# Patient Record
Sex: FEMALE | Race: WHITE | NOT HISPANIC OR LATINO | Employment: UNEMPLOYED | URBAN - METROPOLITAN AREA
[De-identification: names, ages, dates, MRNs, and addresses within clinical notes are randomized per-mention and may not be internally consistent; named-entity substitution may affect disease eponyms.]

---

## 2023-04-26 LAB
ALANINE AMINOTRANSFERASE (SGPT) (U/L) IN SER/PLAS: 9 U/L (ref 7–45)
ALBUMIN (G/DL) IN SER/PLAS: 3.5 G/DL (ref 3.4–5)
ALKALINE PHOSPHATASE (U/L) IN SER/PLAS: 49 U/L (ref 33–136)
ANION GAP IN SER/PLAS: 10 MMOL/L (ref 10–20)
ASPARTATE AMINOTRANSFERASE (SGOT) (U/L) IN SER/PLAS: 13 U/L (ref 9–39)
BASOPHILS (10*3/UL) IN BLOOD BY AUTOMATED COUNT: 0.04 X10E9/L (ref 0–0.1)
BASOPHILS/100 LEUKOCYTES IN BLOOD BY AUTOMATED COUNT: 0.8 % (ref 0–2)
BILIRUBIN TOTAL (MG/DL) IN SER/PLAS: 1.2 MG/DL (ref 0–1.2)
CALCIUM (MG/DL) IN SER/PLAS: 8.9 MG/DL (ref 8.6–10.3)
CARBON DIOXIDE, TOTAL (MMOL/L) IN SER/PLAS: 28 MMOL/L (ref 21–32)
CHLORIDE (MMOL/L) IN SER/PLAS: 102 MMOL/L (ref 98–107)
CHOLESTEROL (MG/DL) IN SER/PLAS: 170 MG/DL (ref 0–199)
CHOLESTEROL IN HDL (MG/DL) IN SER/PLAS: 58.4 MG/DL
CHOLESTEROL/HDL RATIO: 2.9
CREATININE (MG/DL) IN SER/PLAS: 0.88 MG/DL (ref 0.5–1.05)
EOSINOPHILS (10*3/UL) IN BLOOD BY AUTOMATED COUNT: 0.26 X10E9/L (ref 0–0.4)
EOSINOPHILS/100 LEUKOCYTES IN BLOOD BY AUTOMATED COUNT: 5.2 % (ref 0–6)
ERYTHROCYTE DISTRIBUTION WIDTH (RATIO) BY AUTOMATED COUNT: 14.2 % (ref 11.5–14.5)
ERYTHROCYTE MEAN CORPUSCULAR HEMOGLOBIN CONCENTRATION (G/DL) BY AUTOMATED: 31.3 G/DL (ref 32–36)
ERYTHROCYTE MEAN CORPUSCULAR VOLUME (FL) BY AUTOMATED COUNT: 97 FL (ref 80–100)
ERYTHROCYTES (10*6/UL) IN BLOOD BY AUTOMATED COUNT: 4.56 X10E12/L (ref 4–5.2)
ESTIMATED AVERAGE GLUCOSE FOR HBA1C: 197 MG/DL
GFR FEMALE: 64 ML/MIN/1.73M2
GLUCOSE (MG/DL) IN SER/PLAS: 148 MG/DL (ref 74–99)
HEMATOCRIT (%) IN BLOOD BY AUTOMATED COUNT: 44.1 % (ref 36–46)
HEMOGLOBIN (G/DL) IN BLOOD: 13.8 G/DL (ref 12–16)
HEMOGLOBIN A1C/HEMOGLOBIN TOTAL IN BLOOD: 8.5 %
IMMATURE GRANULOCYTES/100 LEUKOCYTES IN BLOOD BY AUTOMATED COUNT: 0.4 % (ref 0–0.9)
LDL: 95 MG/DL (ref 0–99)
LEUKOCYTES (10*3/UL) IN BLOOD BY AUTOMATED COUNT: 5 X10E9/L (ref 4.4–11.3)
LYMPHOCYTES (10*3/UL) IN BLOOD BY AUTOMATED COUNT: 1.55 X10E9/L (ref 0.8–3)
LYMPHOCYTES/100 LEUKOCYTES IN BLOOD BY AUTOMATED COUNT: 31 % (ref 13–44)
MONOCYTES (10*3/UL) IN BLOOD BY AUTOMATED COUNT: 0.49 X10E9/L (ref 0.05–0.8)
MONOCYTES/100 LEUKOCYTES IN BLOOD BY AUTOMATED COUNT: 9.8 % (ref 2–10)
NEUTROPHILS (10*3/UL) IN BLOOD BY AUTOMATED COUNT: 2.64 X10E9/L (ref 1.6–5.5)
NEUTROPHILS/100 LEUKOCYTES IN BLOOD BY AUTOMATED COUNT: 52.8 % (ref 40–80)
PLATELETS (10*3/UL) IN BLOOD AUTOMATED COUNT: 156 X10E9/L (ref 150–450)
POTASSIUM (MMOL/L) IN SER/PLAS: 4.1 MMOL/L (ref 3.5–5.3)
PROTEIN TOTAL: 6.1 G/DL (ref 6.4–8.2)
SODIUM (MMOL/L) IN SER/PLAS: 136 MMOL/L (ref 136–145)
TRIGLYCERIDE (MG/DL) IN SER/PLAS: 84 MG/DL (ref 0–149)
UREA NITROGEN (MG/DL) IN SER/PLAS: 26 MG/DL (ref 6–23)
VLDL: 17 MG/DL (ref 0–40)

## 2023-04-28 RX ORDER — ACETAMINOPHEN 325 MG/1
650 TABLET ORAL EVERY 8 HOURS PRN
COMMUNITY

## 2023-04-28 RX ORDER — LISINOPRIL 20 MG/1
1 TABLET ORAL 2 TIMES DAILY
COMMUNITY
End: 2024-02-26 | Stop reason: HOSPADM

## 2023-04-28 RX ORDER — CARVEDILOL 12.5 MG/1
1 TABLET ORAL
COMMUNITY
Start: 2018-12-13 | End: 2024-04-04 | Stop reason: ALTCHOICE

## 2023-04-28 RX ORDER — ASPIRIN 81 MG/1
81 TABLET ORAL DAILY
COMMUNITY
Start: 2018-12-13

## 2023-04-28 RX ORDER — BLOOD SUGAR DIAGNOSTIC
1 STRIP MISCELLANEOUS DAILY
COMMUNITY
Start: 2018-06-07 | End: 2023-05-01 | Stop reason: SDUPTHER

## 2023-04-28 RX ORDER — GLIMEPIRIDE 4 MG/1
1 TABLET ORAL 2 TIMES DAILY
COMMUNITY
Start: 2022-05-10 | End: 2023-05-01 | Stop reason: SDUPTHER

## 2023-04-28 RX ORDER — CLOPIDOGREL BISULFATE 75 MG/1
1 TABLET ORAL DAILY
COMMUNITY
Start: 2019-01-24

## 2023-04-28 RX ORDER — LINAGLIPTIN 5 MG/1
1 TABLET, FILM COATED ORAL DAILY
COMMUNITY
End: 2023-05-01 | Stop reason: SDUPTHER

## 2023-04-28 RX ORDER — ATORVASTATIN CALCIUM 80 MG/1
1 TABLET, FILM COATED ORAL NIGHTLY
COMMUNITY
Start: 2018-12-13

## 2023-04-28 RX ORDER — CHOLECALCIFEROL (VITAMIN D3) 125 MCG
1 CAPSULE ORAL DAILY
COMMUNITY

## 2023-04-28 RX ORDER — POLYETHYLENE GLYCOL 3350 17 G/17G
POWDER, FOR SOLUTION ORAL
COMMUNITY

## 2023-05-01 ENCOUNTER — OFFICE VISIT (OUTPATIENT)
Dept: PRIMARY CARE | Facility: CLINIC | Age: 88
End: 2023-05-01
Payer: MEDICARE

## 2023-05-01 VITALS
TEMPERATURE: 97.3 F | HEART RATE: 71 BPM | DIASTOLIC BLOOD PRESSURE: 60 MMHG | OXYGEN SATURATION: 92 % | SYSTOLIC BLOOD PRESSURE: 139 MMHG

## 2023-05-01 DIAGNOSIS — E11.9 TYPE 2 DIABETES MELLITUS WITHOUT COMPLICATION, WITHOUT LONG-TERM CURRENT USE OF INSULIN (MULTI): ICD-10-CM

## 2023-05-01 DIAGNOSIS — I42.9 CARDIOMYOPATHY, UNSPECIFIED TYPE (MULTI): ICD-10-CM

## 2023-05-01 DIAGNOSIS — Z00.00 MEDICARE ANNUAL WELLNESS VISIT, SUBSEQUENT: Primary | ICD-10-CM

## 2023-05-01 DIAGNOSIS — E78.00 PURE HYPERCHOLESTEROLEMIA: ICD-10-CM

## 2023-05-01 DIAGNOSIS — I10 HYPERTENSION, BENIGN: ICD-10-CM

## 2023-05-01 PROBLEM — Z98.61 S/P PTCA (PERCUTANEOUS TRANSLUMINAL CORONARY ANGIOPLASTY): Status: ACTIVE | Noted: 2023-05-01

## 2023-05-01 PROBLEM — E55.9 VITAMIN D DEFICIENCY: Status: ACTIVE | Noted: 2023-05-01

## 2023-05-01 PROBLEM — E78.5 HYPERLIPIDEMIA: Status: ACTIVE | Noted: 2023-05-01

## 2023-05-01 PROBLEM — D51.9 B12 DEFICIENCY ANEMIA: Status: ACTIVE | Noted: 2023-05-01

## 2023-05-01 PROBLEM — I25.10 CAD IN NATIVE ARTERY: Status: ACTIVE | Noted: 2023-05-01

## 2023-05-01 PROCEDURE — G0439 PPPS, SUBSEQ VISIT: HCPCS | Performed by: INTERNAL MEDICINE

## 2023-05-01 PROCEDURE — 1170F FXNL STATUS ASSESSED: CPT | Performed by: INTERNAL MEDICINE

## 2023-05-01 PROCEDURE — 3075F SYST BP GE 130 - 139MM HG: CPT | Performed by: INTERNAL MEDICINE

## 2023-05-01 PROCEDURE — 1159F MED LIST DOCD IN RCRD: CPT | Performed by: INTERNAL MEDICINE

## 2023-05-01 PROCEDURE — 99214 OFFICE O/P EST MOD 30 MIN: CPT | Performed by: INTERNAL MEDICINE

## 2023-05-01 PROCEDURE — 3078F DIAST BP <80 MM HG: CPT | Performed by: INTERNAL MEDICINE

## 2023-05-01 PROCEDURE — 1036F TOBACCO NON-USER: CPT | Performed by: INTERNAL MEDICINE

## 2023-05-01 RX ORDER — PIOGLITAZONEHYDROCHLORIDE 30 MG/1
30 TABLET ORAL DAILY
Qty: 90 TABLET | Refills: 0 | Status: SHIPPED | OUTPATIENT
Start: 2023-05-01 | End: 2023-08-01 | Stop reason: SDUPTHER

## 2023-05-01 RX ORDER — BLOOD SUGAR DIAGNOSTIC
1 STRIP MISCELLANEOUS DAILY
Qty: 100 STRIP | Refills: 3 | Status: SHIPPED | OUTPATIENT
Start: 2023-05-01 | End: 2024-04-30

## 2023-05-01 RX ORDER — LINAGLIPTIN 5 MG/1
5 TABLET, FILM COATED ORAL DAILY
Qty: 90 TABLET | Refills: 0 | Status: SHIPPED | OUTPATIENT
Start: 2023-05-01 | End: 2023-08-01 | Stop reason: SDUPTHER

## 2023-05-01 RX ORDER — GLIMEPIRIDE 4 MG/1
4 TABLET ORAL 2 TIMES DAILY
Qty: 180 TABLET | Refills: 0 | Status: SHIPPED | OUTPATIENT
Start: 2023-05-01 | End: 2023-08-01 | Stop reason: SDUPTHER

## 2023-05-01 ASSESSMENT — ENCOUNTER SYMPTOMS
PSYCHIATRIC NEGATIVE: 1
OCCASIONAL FEELINGS OF UNSTEADINESS: 1
EYES NEGATIVE: 1
CARDIOVASCULAR NEGATIVE: 1
ENDOCRINE NEGATIVE: 1
DEPRESSION: 0
RESPIRATORY NEGATIVE: 1
MUSCULOSKELETAL NEGATIVE: 1
GASTROINTESTINAL NEGATIVE: 1
LOSS OF SENSATION IN FEET: 0
HEMATOLOGIC/LYMPHATIC NEGATIVE: 1
CONSTITUTIONAL NEGATIVE: 1
ALLERGIC/IMMUNOLOGIC NEGATIVE: 1
NEUROLOGICAL NEGATIVE: 1

## 2023-05-01 ASSESSMENT — ACTIVITIES OF DAILY LIVING (ADL)
TAKING_MEDICATION: INDEPENDENT
BATHING: INDEPENDENT
GROCERY_SHOPPING: TOTAL CARE
DOING_HOUSEWORK: TOTAL CARE
MANAGING_FINANCES: TOTAL CARE
DRESSING: INDEPENDENT

## 2023-05-01 NOTE — PROGRESS NOTES
Subjective   Patient ID: Andree Johnson is a 88 y.o. female who presents for 3 month follow up  and Medicare Annual Wellness Visit Subsequent.  Patient presents in follow up re:  Type 2 diabetes.  Patient has been compliant with medical therapy as prescribed and mostly compliant with diet and exercise recommendations.  HgbA1c has been maintained at goal level.  No hypoglycemia reported and no other associated complaints.    She continues follow up with Cardiology re:  HTN, lipids and cardiomyopathy.        Review of Systems   Constitutional: Negative.    HENT: Negative.     Eyes: Negative.    Respiratory: Negative.     Cardiovascular: Negative.    Gastrointestinal: Negative.    Endocrine: Negative.    Genitourinary: Negative.    Musculoskeletal: Negative.    Skin: Negative.    Allergic/Immunologic: Negative.    Neurological: Negative.    Hematological: Negative.    Psychiatric/Behavioral: Negative.         Objective     Blood pressure 139/60, pulse 71, temperature 36.3 °C (97.3 °F), temperature source Temporal, SpO2 92 %.   Physical Exam  Constitutional:       Appearance: Normal appearance.   Neck:      Vascular: No carotid bruit.   Cardiovascular:      Rate and Rhythm: Normal rate and regular rhythm.      Pulses: Normal pulses.      Heart sounds: Normal heart sounds. No murmur heard.  Pulmonary:      Effort: Pulmonary effort is normal.      Breath sounds: Normal breath sounds. No wheezing or rales.   Abdominal:      General: Abdomen is flat. There is no distension.      Palpations: Abdomen is soft.      Tenderness: There is no abdominal tenderness.   Musculoskeletal:         General: Normal range of motion.      Cervical back: Normal range of motion and neck supple.   Skin:     General: Skin is warm and dry.   Neurological:      General: No focal deficit present.      Mental Status: She is alert and oriented to person, place, and time.   Psychiatric:         Mood and Affect: Mood normal.         Behavior:  Behavior normal.         Assessment/Plan   Problem List Items Addressed This Visit          Circulatory    Cardiomyopathy (CMS/[facility])    Relevant Medications    clopidogrel (Plavix) 75 mg tablet    carvedilol (Coreg) 12.5 mg tablet    Hypertension, benign       Endocrine/Metabolic    Diabetes mellitus, type 2 (CMS/[facility])       Other    Hyperlipidemia    Medicare annual wellness visit, subsequent - Primary     Medicare annual wellness completed with no new findings or issues identified.  Patient has documented advanced care planning and POA in place  Sugars well controlled overall with A1c at goal with a very nice improvement from 10.6 three months ago to 8.5% on most recent lab.  Will continue present medical therapy and follow up.  BP, cholesterol and cardiomyopathy all managed by Cardiology.  Labs are reviewed with satisfactory values on all parameters.  Pt. advised on improving dietary choices and portion control as well as increasing exercise to promote weight loss.     Follow up in 3 months  Lab to be drawn 1 week prior to next office visit.

## 2023-07-29 ENCOUNTER — LAB (OUTPATIENT)
Dept: LAB | Facility: LAB | Age: 88
End: 2023-07-29
Payer: MEDICARE

## 2023-07-29 DIAGNOSIS — E78.00 PURE HYPERCHOLESTEROLEMIA: ICD-10-CM

## 2023-07-29 DIAGNOSIS — E11.9 TYPE 2 DIABETES MELLITUS WITHOUT COMPLICATION, WITHOUT LONG-TERM CURRENT USE OF INSULIN (MULTI): ICD-10-CM

## 2023-07-29 DIAGNOSIS — I10 HYPERTENSION, BENIGN: ICD-10-CM

## 2023-07-29 LAB
ALANINE AMINOTRANSFERASE (SGPT) (U/L) IN SER/PLAS: 7 U/L (ref 7–45)
ALBUMIN (G/DL) IN SER/PLAS: 3.4 G/DL (ref 3.4–5)
ALKALINE PHOSPHATASE (U/L) IN SER/PLAS: 54 U/L (ref 33–136)
ANION GAP IN SER/PLAS: 11 MMOL/L (ref 10–20)
ASPARTATE AMINOTRANSFERASE (SGOT) (U/L) IN SER/PLAS: 15 U/L (ref 9–39)
BASOPHILS (10*3/UL) IN BLOOD BY AUTOMATED COUNT: 0.04 X10E9/L (ref 0–0.1)
BASOPHILS/100 LEUKOCYTES IN BLOOD BY AUTOMATED COUNT: 0.8 % (ref 0–2)
BILIRUBIN TOTAL (MG/DL) IN SER/PLAS: 1.2 MG/DL (ref 0–1.2)
CALCIUM (MG/DL) IN SER/PLAS: 8.2 MG/DL (ref 8.6–10.3)
CARBON DIOXIDE, TOTAL (MMOL/L) IN SER/PLAS: 28 MMOL/L (ref 21–32)
CHLORIDE (MMOL/L) IN SER/PLAS: 102 MMOL/L (ref 98–107)
CHOLESTEROL (MG/DL) IN SER/PLAS: 164 MG/DL (ref 0–199)
CHOLESTEROL IN HDL (MG/DL) IN SER/PLAS: 55.3 MG/DL
CHOLESTEROL/HDL RATIO: 3
CREATININE (MG/DL) IN SER/PLAS: 0.78 MG/DL (ref 0.5–1.05)
EOSINOPHILS (10*3/UL) IN BLOOD BY AUTOMATED COUNT: 0.19 X10E9/L (ref 0–0.4)
EOSINOPHILS/100 LEUKOCYTES IN BLOOD BY AUTOMATED COUNT: 3.6 % (ref 0–6)
ERYTHROCYTE DISTRIBUTION WIDTH (RATIO) BY AUTOMATED COUNT: 14.2 % (ref 11.5–14.5)
ERYTHROCYTE MEAN CORPUSCULAR HEMOGLOBIN CONCENTRATION (G/DL) BY AUTOMATED: 33.4 G/DL (ref 32–36)
ERYTHROCYTE MEAN CORPUSCULAR VOLUME (FL) BY AUTOMATED COUNT: 99 FL (ref 80–100)
ERYTHROCYTES (10*6/UL) IN BLOOD BY AUTOMATED COUNT: 3.85 X10E12/L (ref 4–5.2)
ESTIMATED AVERAGE GLUCOSE FOR HBA1C: 134 MG/DL
GFR FEMALE: 73 ML/MIN/1.73M2
GLUCOSE (MG/DL) IN SER/PLAS: 114 MG/DL (ref 74–99)
HEMATOCRIT (%) IN BLOOD BY AUTOMATED COUNT: 38.3 % (ref 36–46)
HEMOGLOBIN (G/DL) IN BLOOD: 12.8 G/DL (ref 12–16)
HEMOGLOBIN A1C/HEMOGLOBIN TOTAL IN BLOOD: 6.3 %
IMMATURE GRANULOCYTES/100 LEUKOCYTES IN BLOOD BY AUTOMATED COUNT: 0.6 % (ref 0–0.9)
LDL: 96 MG/DL (ref 0–99)
LEUKOCYTES (10*3/UL) IN BLOOD BY AUTOMATED COUNT: 5.3 X10E9/L (ref 4.4–11.3)
LYMPHOCYTES (10*3/UL) IN BLOOD BY AUTOMATED COUNT: 1.28 X10E9/L (ref 0.8–3)
LYMPHOCYTES/100 LEUKOCYTES IN BLOOD BY AUTOMATED COUNT: 24.2 % (ref 13–44)
MONOCYTES (10*3/UL) IN BLOOD BY AUTOMATED COUNT: 0.56 X10E9/L (ref 0.05–0.8)
MONOCYTES/100 LEUKOCYTES IN BLOOD BY AUTOMATED COUNT: 10.6 % (ref 2–10)
NEUTROPHILS (10*3/UL) IN BLOOD BY AUTOMATED COUNT: 3.2 X10E9/L (ref 1.6–5.5)
NEUTROPHILS/100 LEUKOCYTES IN BLOOD BY AUTOMATED COUNT: 60.2 % (ref 40–80)
PLATELETS (10*3/UL) IN BLOOD AUTOMATED COUNT: 192 X10E9/L (ref 150–450)
POTASSIUM (MMOL/L) IN SER/PLAS: 4.3 MMOL/L (ref 3.5–5.3)
PROTEIN TOTAL: 5.5 G/DL (ref 6.4–8.2)
SODIUM (MMOL/L) IN SER/PLAS: 137 MMOL/L (ref 136–145)
TRIGLYCERIDE (MG/DL) IN SER/PLAS: 65 MG/DL (ref 0–149)
UREA NITROGEN (MG/DL) IN SER/PLAS: 17 MG/DL (ref 6–23)
VLDL: 13 MG/DL (ref 0–40)

## 2023-07-29 PROCEDURE — 80053 COMPREHEN METABOLIC PANEL: CPT

## 2023-07-29 PROCEDURE — 85025 COMPLETE CBC W/AUTO DIFF WBC: CPT

## 2023-07-29 PROCEDURE — 36415 COLL VENOUS BLD VENIPUNCTURE: CPT

## 2023-07-29 PROCEDURE — 83036 HEMOGLOBIN GLYCOSYLATED A1C: CPT

## 2023-07-29 PROCEDURE — 80061 LIPID PANEL: CPT

## 2023-08-01 ENCOUNTER — OFFICE VISIT (OUTPATIENT)
Dept: PRIMARY CARE | Facility: CLINIC | Age: 88
End: 2023-08-01
Payer: MEDICARE

## 2023-08-01 VITALS
DIASTOLIC BLOOD PRESSURE: 60 MMHG | SYSTOLIC BLOOD PRESSURE: 122 MMHG | HEART RATE: 71 BPM | OXYGEN SATURATION: 93 % | TEMPERATURE: 96.9 F

## 2023-08-01 DIAGNOSIS — I10 HYPERTENSION, BENIGN: ICD-10-CM

## 2023-08-01 DIAGNOSIS — E78.00 PURE HYPERCHOLESTEROLEMIA: ICD-10-CM

## 2023-08-01 DIAGNOSIS — K62.5 BRBPR (BRIGHT RED BLOOD PER RECTUM): ICD-10-CM

## 2023-08-01 DIAGNOSIS — E11.9 TYPE 2 DIABETES MELLITUS WITHOUT COMPLICATION, WITHOUT LONG-TERM CURRENT USE OF INSULIN (MULTI): Primary | ICD-10-CM

## 2023-08-01 DIAGNOSIS — I25.10 CAD IN NATIVE ARTERY: ICD-10-CM

## 2023-08-01 DIAGNOSIS — D51.9 ANEMIA DUE TO VITAMIN B12 DEFICIENCY, UNSPECIFIED B12 DEFICIENCY TYPE: ICD-10-CM

## 2023-08-01 PROCEDURE — 99214 OFFICE O/P EST MOD 30 MIN: CPT | Performed by: INTERNAL MEDICINE

## 2023-08-01 PROCEDURE — 1159F MED LIST DOCD IN RCRD: CPT | Performed by: INTERNAL MEDICINE

## 2023-08-01 PROCEDURE — 3078F DIAST BP <80 MM HG: CPT | Performed by: INTERNAL MEDICINE

## 2023-08-01 PROCEDURE — 1036F TOBACCO NON-USER: CPT | Performed by: INTERNAL MEDICINE

## 2023-08-01 PROCEDURE — 3074F SYST BP LT 130 MM HG: CPT | Performed by: INTERNAL MEDICINE

## 2023-08-01 RX ORDER — LINAGLIPTIN 5 MG/1
5 TABLET, FILM COATED ORAL DAILY
Qty: 90 TABLET | Refills: 0 | Status: SHIPPED | OUTPATIENT
Start: 2023-08-01 | End: 2023-10-31 | Stop reason: SDUPTHER

## 2023-08-01 RX ORDER — PIOGLITAZONEHYDROCHLORIDE 30 MG/1
30 TABLET ORAL DAILY
Qty: 90 TABLET | Refills: 0 | Status: SHIPPED | OUTPATIENT
Start: 2023-08-01 | End: 2023-10-31 | Stop reason: SDUPTHER

## 2023-08-01 RX ORDER — GLIMEPIRIDE 2 MG/1
2 TABLET ORAL 2 TIMES DAILY
Qty: 180 TABLET | Refills: 0 | Status: SHIPPED | OUTPATIENT
Start: 2023-08-01 | End: 2023-10-31 | Stop reason: SDUPTHER

## 2023-08-01 ASSESSMENT — ENCOUNTER SYMPTOMS
PSYCHIATRIC NEGATIVE: 1
ENDOCRINE NEGATIVE: 1
EYES NEGATIVE: 1
CARDIOVASCULAR NEGATIVE: 1
MUSCULOSKELETAL NEGATIVE: 1
RESPIRATORY NEGATIVE: 1
ALLERGIC/IMMUNOLOGIC NEGATIVE: 1
HEMATOLOGIC/LYMPHATIC NEGATIVE: 1
CONSTITUTIONAL NEGATIVE: 1
GASTROINTESTINAL NEGATIVE: 1
NEUROLOGICAL NEGATIVE: 1

## 2023-08-01 NOTE — PROGRESS NOTES
Subjective   Patient ID: Andree Johnson is a 88 y.o. female who presents for 3 month follow up .  Patient presents in follow up re:  Type 2 diabetes.  Patient has been compliant with medical therapy as prescribed and mostly compliant with diet and exercise recommendations.  HgbA1c has been maintained at goal level.  No hypoglycemia reported and no other associated complaints.    She continues follow up with Cardiology re:  HTN, lipids and cardiomyopathy.        Review of Systems   Constitutional: Negative.    HENT: Negative.     Eyes: Negative.    Respiratory: Negative.     Cardiovascular: Negative.    Gastrointestinal: Negative.    Endocrine: Negative.    Genitourinary: Negative.    Musculoskeletal: Negative.    Skin: Negative.    Allergic/Immunologic: Negative.    Neurological: Negative.    Hematological: Negative.    Psychiatric/Behavioral: Negative.         Objective     Blood pressure 122/60, pulse 71, temperature 36.1 °C (96.9 °F), temperature source Temporal, SpO2 93 %.   Physical Exam  Constitutional:       Appearance: Normal appearance.   Neck:      Vascular: No carotid bruit.   Cardiovascular:      Rate and Rhythm: Normal rate and regular rhythm.      Pulses: Normal pulses.      Heart sounds: Normal heart sounds. No murmur heard.  Pulmonary:      Effort: Pulmonary effort is normal.      Breath sounds: Normal breath sounds. No wheezing or rales.   Abdominal:      General: Abdomen is flat. There is no distension.      Palpations: Abdomen is soft.      Tenderness: There is no abdominal tenderness.   Musculoskeletal:         General: Normal range of motion.      Cervical back: Normal range of motion and neck supple.   Skin:     General: Skin is warm and dry.   Neurological:      General: No focal deficit present.      Mental Status: She is alert and oriented to person, place, and time.   Psychiatric:         Mood and Affect: Mood normal.         Behavior: Behavior normal.         Assessment/Plan   Problem  List Items Addressed This Visit       B12 deficiency anemia    Relevant Orders    CBC and Auto Differential    Vitamin B12    CAD in native artery    Diabetes mellitus, type 2 (CMS/HCC) - Primary    Relevant Medications    glimepiride (Amaryl) 2 mg tablet    linaGLIPtin (Tradjenta) 5 mg tablet    pioglitazone (Actos) 30 mg tablet    Other Relevant Orders    Hemoglobin A1C    Comprehensive Metabolic Panel    Hyperlipidemia    Relevant Orders    Comprehensive Metabolic Panel    Lipid Panel    Hypertension, benign    Relevant Orders    CBC and Auto Differential    Comprehensive Metabolic Panel     Other Visit Diagnoses       BRBPR (bright red blood per rectum)        Relevant Orders    Referral to General Surgery            Sugars well controlled overall with A1c at goal with a very nice improvement from 10.6% six months ago to 8.5% 3 months ago to 6.3% on most recent lab.  Will continue present medical therapy, but reduce glimepiride dose by 50% to prevent hypoglycemia and follow up.  BP, cholesterol and cardiomyopathy all managed by Cardiology.  Labs are reviewed with satisfactory values on all parameters.  Pt. advised on improving dietary choices and portion control as well as increasing exercise to promote weight loss.   She has a new complaint of BRB per rectum for the last 6-8 weeks.  Denies constipation or other associated complaints.  I suspect this is hemorrhoidal and will refer to Gen. Surgery for eval.    Follow up in 3 months  Lab to be drawn 1 week prior to next office visit.

## 2023-08-30 ENCOUNTER — TELEPHONE (OUTPATIENT)
Dept: PRIMARY CARE | Facility: CLINIC | Age: 88
End: 2023-08-30
Payer: MEDICARE

## 2023-09-06 LAB
ANION GAP IN SER/PLAS: 10 MMOL/L (ref 10–20)
CALCIUM (MG/DL) IN SER/PLAS: 8.2 MG/DL (ref 8.6–10.3)
CARBON DIOXIDE, TOTAL (MMOL/L) IN SER/PLAS: 28 MMOL/L (ref 21–32)
CHLORIDE (MMOL/L) IN SER/PLAS: 99 MMOL/L (ref 98–107)
CREATININE (MG/DL) IN SER/PLAS: 0.9 MG/DL (ref 0.5–1.05)
GFR FEMALE: 62 ML/MIN/1.73M2
GLUCOSE (MG/DL) IN SER/PLAS: 290 MG/DL (ref 74–99)
POTASSIUM (MMOL/L) IN SER/PLAS: 4.9 MMOL/L (ref 3.5–5.3)
SODIUM (MMOL/L) IN SER/PLAS: 132 MMOL/L (ref 136–145)
UREA NITROGEN (MG/DL) IN SER/PLAS: 21 MG/DL (ref 6–23)

## 2023-10-24 ENCOUNTER — LAB (OUTPATIENT)
Dept: LAB | Facility: LAB | Age: 88
End: 2023-10-24
Payer: MEDICARE

## 2023-10-24 DIAGNOSIS — I10 HYPERTENSION, BENIGN: ICD-10-CM

## 2023-10-24 DIAGNOSIS — I42.9 CARDIOMYOPATHY, UNSPECIFIED (MULTI): ICD-10-CM

## 2023-10-24 DIAGNOSIS — E78.00 PURE HYPERCHOLESTEROLEMIA: ICD-10-CM

## 2023-10-24 DIAGNOSIS — D51.9 ANEMIA DUE TO VITAMIN B12 DEFICIENCY, UNSPECIFIED B12 DEFICIENCY TYPE: ICD-10-CM

## 2023-10-24 DIAGNOSIS — E11.9 TYPE 2 DIABETES MELLITUS WITHOUT COMPLICATION, WITHOUT LONG-TERM CURRENT USE OF INSULIN (MULTI): ICD-10-CM

## 2023-10-24 DIAGNOSIS — I25.10 ATHEROSCLEROTIC HEART DISEASE OF NATIVE CORONARY ARTERY WITHOUT ANGINA PECTORIS: Primary | ICD-10-CM

## 2023-10-24 LAB
ALBUMIN SERPL BCP-MCNC: 3.1 G/DL (ref 3.4–5)
ALP SERPL-CCNC: 54 U/L (ref 33–136)
ALT SERPL W P-5'-P-CCNC: 9 U/L (ref 7–45)
ANION GAP SERPL CALC-SCNC: 11 MMOL/L (ref 10–20)
AST SERPL W P-5'-P-CCNC: 13 U/L (ref 9–39)
BASOPHILS # BLD AUTO: 0.03 X10*3/UL (ref 0–0.1)
BASOPHILS NFR BLD AUTO: 0.4 %
BILIRUB SERPL-MCNC: 1.7 MG/DL (ref 0–1.2)
BNP SERPL-MCNC: 101 PG/ML (ref 0–99)
BUN SERPL-MCNC: 34 MG/DL (ref 6–23)
CALCIUM SERPL-MCNC: 8.4 MG/DL (ref 8.6–10.3)
CHLORIDE SERPL-SCNC: 103 MMOL/L (ref 98–107)
CHOLEST SERPL-MCNC: 84 MG/DL (ref 0–199)
CHOLESTEROL/HDL RATIO: 1.8
CO2 SERPL-SCNC: 28 MMOL/L (ref 21–32)
CREAT SERPL-MCNC: 0.87 MG/DL (ref 0.5–1.05)
EOSINOPHIL # BLD AUTO: 0.51 X10*3/UL (ref 0–0.4)
EOSINOPHIL NFR BLD AUTO: 7.5 %
ERYTHROCYTE [DISTWIDTH] IN BLOOD BY AUTOMATED COUNT: 14.1 % (ref 11.5–14.5)
EST. AVERAGE GLUCOSE BLD GHB EST-MCNC: 209 MG/DL
GFR SERPL CREATININE-BSD FRML MDRD: 65 ML/MIN/1.73M*2
GLUCOSE SERPL-MCNC: 177 MG/DL (ref 74–99)
HBA1C MFR BLD: 8.9 %
HCT VFR BLD AUTO: 38.5 % (ref 36–46)
HDLC SERPL-MCNC: 46 MG/DL
HGB BLD-MCNC: 12.2 G/DL (ref 12–16)
IMM GRANULOCYTES # BLD AUTO: 0.03 X10*3/UL (ref 0–0.5)
IMM GRANULOCYTES NFR BLD AUTO: 0.4 % (ref 0–0.9)
LDLC SERPL CALC-MCNC: 25 MG/DL
LYMPHOCYTES # BLD AUTO: 1.46 X10*3/UL (ref 0.8–3)
LYMPHOCYTES NFR BLD AUTO: 21.5 %
MCH RBC QN AUTO: 30.4 PG (ref 26–34)
MCHC RBC AUTO-ENTMCNC: 31.7 G/DL (ref 32–36)
MCV RBC AUTO: 96 FL (ref 80–100)
MONOCYTES # BLD AUTO: 0.52 X10*3/UL (ref 0.05–0.8)
MONOCYTES NFR BLD AUTO: 7.7 %
NEUTROPHILS # BLD AUTO: 4.23 X10*3/UL (ref 1.6–5.5)
NEUTROPHILS NFR BLD AUTO: 62.5 %
NON HDL CHOLESTEROL: 38 MG/DL (ref 0–149)
NRBC BLD-RTO: 0 /100 WBCS (ref 0–0)
PLATELET # BLD AUTO: 144 X10*3/UL (ref 150–450)
PMV BLD AUTO: 9.9 FL (ref 7.5–11.5)
POTASSIUM SERPL-SCNC: 4.5 MMOL/L (ref 3.5–5.3)
PROT SERPL-MCNC: 5.3 G/DL (ref 6.4–8.2)
RBC # BLD AUTO: 4.01 X10*6/UL (ref 4–5.2)
SODIUM SERPL-SCNC: 137 MMOL/L (ref 136–145)
TRIGL SERPL-MCNC: 67 MG/DL (ref 0–149)
VIT B12 SERPL-MCNC: 187 PG/ML (ref 211–911)
VLDL: 13 MG/DL (ref 0–40)
WBC # BLD AUTO: 6.8 X10*3/UL (ref 4.4–11.3)

## 2023-10-24 PROCEDURE — 80053 COMPREHEN METABOLIC PANEL: CPT

## 2023-10-24 PROCEDURE — 36415 COLL VENOUS BLD VENIPUNCTURE: CPT

## 2023-10-24 PROCEDURE — 85025 COMPLETE CBC W/AUTO DIFF WBC: CPT

## 2023-10-24 PROCEDURE — 83036 HEMOGLOBIN GLYCOSYLATED A1C: CPT

## 2023-10-24 PROCEDURE — 82607 VITAMIN B-12: CPT

## 2023-10-24 PROCEDURE — 80061 LIPID PANEL: CPT

## 2023-10-24 PROCEDURE — 83880 ASSAY OF NATRIURETIC PEPTIDE: CPT

## 2023-10-26 ENCOUNTER — TELEPHONE (OUTPATIENT)
Dept: CARDIOLOGY | Facility: CLINIC | Age: 88
End: 2023-10-26
Payer: MEDICARE

## 2023-10-26 NOTE — TELEPHONE ENCOUNTER
----- Message from Weston Gill MD sent at 10/24/2023 12:21 PM EDT -----  Labs stable    10/26/23  2917  Called results to patient with patient verbalizing understanding.

## 2023-10-31 ENCOUNTER — OFFICE VISIT (OUTPATIENT)
Dept: PRIMARY CARE | Facility: CLINIC | Age: 88
End: 2023-10-31
Payer: MEDICARE

## 2023-10-31 VITALS
TEMPERATURE: 96.7 F | DIASTOLIC BLOOD PRESSURE: 70 MMHG | SYSTOLIC BLOOD PRESSURE: 132 MMHG | OXYGEN SATURATION: 95 % | HEART RATE: 63 BPM

## 2023-10-31 DIAGNOSIS — I10 HYPERTENSION, BENIGN: ICD-10-CM

## 2023-10-31 DIAGNOSIS — E66.01 CLASS 3 SEVERE OBESITY DUE TO EXCESS CALORIES WITH SERIOUS COMORBIDITY AND BODY MASS INDEX (BMI) OF 40.0 TO 44.9 IN ADULT (MULTI): ICD-10-CM

## 2023-10-31 DIAGNOSIS — I25.10 CAD IN NATIVE ARTERY: ICD-10-CM

## 2023-10-31 DIAGNOSIS — E11.9 TYPE 2 DIABETES MELLITUS WITHOUT COMPLICATION, WITHOUT LONG-TERM CURRENT USE OF INSULIN (MULTI): Primary | ICD-10-CM

## 2023-10-31 DIAGNOSIS — E78.00 PURE HYPERCHOLESTEROLEMIA: ICD-10-CM

## 2023-10-31 PROBLEM — I35.0 AORTIC VALVE STENOSIS: Status: ACTIVE | Noted: 2023-10-31

## 2023-10-31 PROBLEM — R26.2 DIFFICULTY WALKING: Status: ACTIVE | Noted: 2023-10-31

## 2023-10-31 PROBLEM — Z85.3 HISTORY OF BREAST CANCER: Status: ACTIVE | Noted: 2023-10-31

## 2023-10-31 PROCEDURE — 3078F DIAST BP <80 MM HG: CPT | Performed by: INTERNAL MEDICINE

## 2023-10-31 PROCEDURE — 99214 OFFICE O/P EST MOD 30 MIN: CPT | Performed by: INTERNAL MEDICINE

## 2023-10-31 PROCEDURE — 3075F SYST BP GE 130 - 139MM HG: CPT | Performed by: INTERNAL MEDICINE

## 2023-10-31 PROCEDURE — 1159F MED LIST DOCD IN RCRD: CPT | Performed by: INTERNAL MEDICINE

## 2023-10-31 PROCEDURE — 1036F TOBACCO NON-USER: CPT | Performed by: INTERNAL MEDICINE

## 2023-10-31 RX ORDER — LINAGLIPTIN 5 MG/1
5 TABLET, FILM COATED ORAL DAILY
Qty: 90 TABLET | Refills: 0 | Status: SHIPPED | OUTPATIENT
Start: 2023-10-31 | End: 2024-03-15

## 2023-10-31 RX ORDER — GLIMEPIRIDE 4 MG/1
4 TABLET ORAL 2 TIMES DAILY
Qty: 180 TABLET | Refills: 0 | Status: SHIPPED | OUTPATIENT
Start: 2023-10-31 | End: 2024-03-15

## 2023-10-31 RX ORDER — PIOGLITAZONEHYDROCHLORIDE 30 MG/1
30 TABLET ORAL DAILY
Qty: 90 TABLET | Refills: 0 | Status: SHIPPED | OUTPATIENT
Start: 2023-10-31 | End: 2024-03-15 | Stop reason: WASHOUT

## 2023-10-31 ASSESSMENT — ENCOUNTER SYMPTOMS
PSYCHIATRIC NEGATIVE: 1
NEUROLOGICAL NEGATIVE: 1
MUSCULOSKELETAL NEGATIVE: 1
CARDIOVASCULAR NEGATIVE: 1
ENDOCRINE NEGATIVE: 1
GASTROINTESTINAL NEGATIVE: 1
RESPIRATORY NEGATIVE: 1
HEMATOLOGIC/LYMPHATIC NEGATIVE: 1
ALLERGIC/IMMUNOLOGIC NEGATIVE: 1
CONSTITUTIONAL NEGATIVE: 1
EYES NEGATIVE: 1

## 2023-10-31 NOTE — PROGRESS NOTES
Subjective   Patient ID: Andree Johnson is a 88 y.o. female who presents for 3 month follow up .  Patient presents in follow up re:  Type 2 diabetes.  Patient has been compliant with medical therapy as prescribed and mostly compliant with diet and exercise recommendations.  HgbA1c has been maintained at goal level.  No hypoglycemia reported and no other associated complaints.    She continues follow up with Cardiology re:  HTN, lipids and cardiomyopathy.        Review of Systems   Constitutional: Negative.    HENT: Negative.     Eyes: Negative.    Respiratory: Negative.     Cardiovascular: Negative.    Gastrointestinal: Negative.    Endocrine: Negative.    Genitourinary: Negative.    Musculoskeletal: Negative.    Skin: Negative.    Allergic/Immunologic: Negative.    Neurological: Negative.    Hematological: Negative.    Psychiatric/Behavioral: Negative.         Objective     Blood pressure 132/70, pulse 63, temperature 35.9 °C (96.7 °F), temperature source Temporal, SpO2 95 %.   Physical Exam  Constitutional:       Appearance: Normal appearance.   Neck:      Vascular: No carotid bruit.   Cardiovascular:      Rate and Rhythm: Normal rate and regular rhythm.      Pulses: Normal pulses.      Heart sounds: Normal heart sounds. No murmur heard.  Pulmonary:      Effort: Pulmonary effort is normal.      Breath sounds: Normal breath sounds. No wheezing or rales.   Abdominal:      General: Abdomen is flat. There is no distension.      Palpations: Abdomen is soft.      Tenderness: There is no abdominal tenderness.   Musculoskeletal:         General: Normal range of motion.      Cervical back: Normal range of motion and neck supple.   Skin:     General: Skin is warm and dry.   Neurological:      General: No focal deficit present.      Mental Status: She is alert and oriented to person, place, and time.   Psychiatric:         Mood and Affect: Mood normal.         Behavior: Behavior normal.         Assessment/Plan   Problem  List Items Addressed This Visit       CAD in native artery    Diabetes mellitus, type 2 (CMS/[facility]) - Primary    Relevant Medications    glimepiride (Amaryl) 4 mg tablet    linaGLIPtin (Tradjenta) 5 mg tablet    pioglitazone (Actos) 30 mg tablet    Other Relevant Orders    Comprehensive Metabolic Panel    Hemoglobin A1C    Lipid Panel    Hyperlipidemia    Relevant Orders    Lipid Panel    Hypertension, benign     Other Visit Diagnoses       Class 3 severe obesity due to excess calories with serious comorbidity and body mass index (BMI) of 40.0 to 44.9 in adult (CMS/[facility])                Sugars well controlled overall with A1c at goal with an improvement from 10.6% nine months ago to 8.5% 6 months ago to 6.3% 3 months ago but has increased to 8.9% on most recent lab.  Will continue present medical therapy, but increase glimepiride dose back up.  I had reduced last visit since A1c was so good, but clearly she needs the higher dose.  Will follow.  BP, cholesterol and cardiomyopathy all managed by Cardiology.  Labs are reviewed with satisfactory values on all parameters.  Last recorded BMI was >40 and she does not appear to have lost weight since that time.  Pt. advised on improving dietary choices and portion control as well as increasing exercise to promote weight loss.   She states that she is not now having any issue with rectal bleeding so did not see Dr. Patel for this.      Follow up in 3 months  Lab to be drawn 1 week prior to next office visit.

## 2023-11-08 DIAGNOSIS — I42.9 CARDIOMYOPATHY, UNSPECIFIED TYPE (MULTI): Primary | ICD-10-CM

## 2023-11-08 RX ORDER — FUROSEMIDE 20 MG/1
20 TABLET ORAL DAILY
Qty: 90 TABLET | Refills: 1 | Status: SHIPPED | OUTPATIENT
Start: 2023-11-08 | End: 2024-05-06

## 2023-11-08 RX ORDER — FUROSEMIDE 20 MG/1
20 TABLET ORAL DAILY
COMMUNITY
End: 2023-11-08 | Stop reason: SDUPTHER

## 2023-11-08 NOTE — TELEPHONE ENCOUNTER
Last appointment:  9/12/23 with Dr. Gill--Medication and dose match this visit.  Next appointment: 3/5/24 with Dr. Gill, but supposed to be with Lenore. Message sent to Elisabeth.  Labs labs: 10/24/23

## 2023-11-08 NOTE — TELEPHONE ENCOUNTER
----- Message from Soumya Fonseca sent at 11/8/2023 12:24 PM EST -----  Regarding: Med Refill  Patient called for refill of Furosemide 20 mg  Daily.  Please send to [facility] [place].  Thank you

## 2024-01-26 ENCOUNTER — APPOINTMENT (OUTPATIENT)
Dept: RADIOLOGY | Facility: HOSPITAL | Age: 89
DRG: 683 | End: 2024-01-26
Payer: MEDICARE

## 2024-01-26 ENCOUNTER — HOSPITAL ENCOUNTER (INPATIENT)
Facility: HOSPITAL | Age: 89
LOS: 1 days | Discharge: NURSING, REHAB OR LONG-TERM CARE | DRG: 683 | End: 2024-02-02
Attending: EMERGENCY MEDICINE | Admitting: INTERNAL MEDICINE
Payer: MEDICARE

## 2024-01-26 ENCOUNTER — APPOINTMENT (OUTPATIENT)
Dept: CARDIOLOGY | Facility: HOSPITAL | Age: 89
DRG: 683 | End: 2024-01-26
Payer: MEDICARE
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
## 2024-01-26 DIAGNOSIS — R26.2 INABILITY TO WALK: ICD-10-CM

## 2024-01-26 DIAGNOSIS — D51.3 OTHER DIETARY VITAMIN B12 DEFICIENCY ANEMIA: ICD-10-CM

## 2024-01-26 DIAGNOSIS — R53.1 GENERALIZED WEAKNESS: ICD-10-CM

## 2024-01-26 LAB
ALBUMIN SERPL BCP-MCNC: 3.3 G/DL (ref 3.4–5)
ALP SERPL-CCNC: 51 U/L (ref 33–136)
ALT SERPL W P-5'-P-CCNC: 11 U/L (ref 7–45)
ANION GAP SERPL CALC-SCNC: 13 MMOL/L (ref 10–20)
AST SERPL W P-5'-P-CCNC: 17 U/L (ref 9–39)
BASOPHILS # BLD AUTO: 0.03 X10*3/UL (ref 0–0.1)
BASOPHILS NFR BLD AUTO: 0.6 %
BILIRUB SERPL-MCNC: 1.3 MG/DL (ref 0–1.2)
BUN SERPL-MCNC: 36 MG/DL (ref 6–23)
CALCIUM SERPL-MCNC: 8.4 MG/DL (ref 8.6–10.3)
CHLORIDE SERPL-SCNC: 101 MMOL/L (ref 98–107)
CO2 SERPL-SCNC: 25 MMOL/L (ref 21–32)
CREAT SERPL-MCNC: 1.15 MG/DL (ref 0.5–1.05)
EGFRCR SERPLBLD CKD-EPI 2021: 46 ML/MIN/1.73M*2
EOSINOPHIL # BLD AUTO: 0.33 X10*3/UL (ref 0–0.4)
EOSINOPHIL NFR BLD AUTO: 6.8 %
ERYTHROCYTE [DISTWIDTH] IN BLOOD BY AUTOMATED COUNT: 14.6 % (ref 11.5–14.5)
GLUCOSE SERPL-MCNC: 254 MG/DL (ref 74–99)
HCT VFR BLD AUTO: 35.8 % (ref 36–46)
HGB BLD-MCNC: 11.7 G/DL (ref 12–16)
IMM GRANULOCYTES # BLD AUTO: 0.02 X10*3/UL (ref 0–0.5)
IMM GRANULOCYTES NFR BLD AUTO: 0.4 % (ref 0–0.9)
INR PPP: 1 (ref 0.9–1.1)
LYMPHOCYTES # BLD AUTO: 1.06 X10*3/UL (ref 0.8–3)
LYMPHOCYTES NFR BLD AUTO: 21.8 %
MAGNESIUM SERPL-MCNC: 2.09 MG/DL (ref 1.6–2.4)
MCH RBC QN AUTO: 32.1 PG (ref 26–34)
MCHC RBC AUTO-ENTMCNC: 32.7 G/DL (ref 32–36)
MCV RBC AUTO: 98 FL (ref 80–100)
MONOCYTES # BLD AUTO: 0.52 X10*3/UL (ref 0.05–0.8)
MONOCYTES NFR BLD AUTO: 10.7 %
NEUTROPHILS # BLD AUTO: 2.9 X10*3/UL (ref 1.6–5.5)
NEUTROPHILS NFR BLD AUTO: 59.7 %
NRBC BLD-RTO: 0 /100 WBCS (ref 0–0)
PLATELET # BLD AUTO: 129 X10*3/UL (ref 150–450)
POTASSIUM SERPL-SCNC: 5.5 MMOL/L (ref 3.5–5.3)
PROT SERPL-MCNC: 5.9 G/DL (ref 6.4–8.2)
PROTHROMBIN TIME: 11.7 SECONDS (ref 9.8–12.8)
RBC # BLD AUTO: 3.65 X10*6/UL (ref 4–5.2)
SODIUM SERPL-SCNC: 133 MMOL/L (ref 136–145)
WBC # BLD AUTO: 4.9 X10*3/UL (ref 4.4–11.3)

## 2024-01-26 PROCEDURE — 72125 CT NECK SPINE W/O DYE: CPT

## 2024-01-26 PROCEDURE — 80053 COMPREHEN METABOLIC PANEL: CPT | Performed by: EMERGENCY MEDICINE

## 2024-01-26 PROCEDURE — 83735 ASSAY OF MAGNESIUM: CPT | Performed by: EMERGENCY MEDICINE

## 2024-01-26 PROCEDURE — 85025 COMPLETE CBC W/AUTO DIFF WBC: CPT | Performed by: EMERGENCY MEDICINE

## 2024-01-26 PROCEDURE — 72125 CT NECK SPINE W/O DYE: CPT | Performed by: RADIOLOGY

## 2024-01-26 PROCEDURE — 85610 PROTHROMBIN TIME: CPT | Performed by: EMERGENCY MEDICINE

## 2024-01-26 PROCEDURE — 36415 COLL VENOUS BLD VENIPUNCTURE: CPT | Performed by: EMERGENCY MEDICINE

## 2024-01-26 PROCEDURE — 70450 CT HEAD/BRAIN W/O DYE: CPT

## 2024-01-26 PROCEDURE — 70450 CT HEAD/BRAIN W/O DYE: CPT | Performed by: RADIOLOGY

## 2024-01-26 PROCEDURE — 99285 EMERGENCY DEPT VISIT HI MDM: CPT | Performed by: EMERGENCY MEDICINE

## 2024-01-26 PROCEDURE — 93005 ELECTROCARDIOGRAM TRACING: CPT

## 2024-01-26 ASSESSMENT — LIFESTYLE VARIABLES
EVER FELT BAD OR GUILTY ABOUT YOUR DRINKING: NO
HAVE PEOPLE ANNOYED YOU BY CRITICIZING YOUR DRINKING: NO
EVER HAD A DRINK FIRST THING IN THE MORNING TO STEADY YOUR NERVES TO GET RID OF A HANGOVER: NO
HAVE YOU EVER FELT YOU SHOULD CUT DOWN ON YOUR DRINKING: NO

## 2024-01-26 ASSESSMENT — COLUMBIA-SUICIDE SEVERITY RATING SCALE - C-SSRS
6. HAVE YOU EVER DONE ANYTHING, STARTED TO DO ANYTHING, OR PREPARED TO DO ANYTHING TO END YOUR LIFE?: NO
2. HAVE YOU ACTUALLY HAD ANY THOUGHTS OF KILLING YOURSELF?: NO
1. IN THE PAST MONTH, HAVE YOU WISHED YOU WERE DEAD OR WISHED YOU COULD GO TO SLEEP AND NOT WAKE UP?: NO

## 2024-01-27 PROBLEM — K92.1 MELENA: Status: ACTIVE | Noted: 2024-01-27

## 2024-01-27 PROBLEM — R26.2 AMBULATORY DYSFUNCTION: Status: ACTIVE | Noted: 2024-01-27

## 2024-01-27 LAB
ALBUMIN SERPL BCP-MCNC: 3 G/DL (ref 3.4–5)
ANION GAP SERPL CALC-SCNC: 10 MMOL/L (ref 10–20)
BUN SERPL-MCNC: 34 MG/DL (ref 6–23)
CALCIUM SERPL-MCNC: 8.1 MG/DL (ref 8.6–10.3)
CHLORIDE SERPL-SCNC: 103 MMOL/L (ref 98–107)
CO2 SERPL-SCNC: 27 MMOL/L (ref 21–32)
CREAT SERPL-MCNC: 0.98 MG/DL (ref 0.5–1.05)
EGFRCR SERPLBLD CKD-EPI 2021: 56 ML/MIN/1.73M*2
ERYTHROCYTE [DISTWIDTH] IN BLOOD BY AUTOMATED COUNT: 14.6 % (ref 11.5–14.5)
EST. AVERAGE GLUCOSE BLD GHB EST-MCNC: 163 MG/DL
FLUAV RNA RESP QL NAA+PROBE: NOT DETECTED
FLUBV RNA RESP QL NAA+PROBE: NOT DETECTED
GLUCOSE BLD MANUAL STRIP-MCNC: 168 MG/DL (ref 74–99)
GLUCOSE BLD MANUAL STRIP-MCNC: 180 MG/DL (ref 74–99)
GLUCOSE BLD MANUAL STRIP-MCNC: 203 MG/DL (ref 74–99)
GLUCOSE BLD MANUAL STRIP-MCNC: 233 MG/DL (ref 74–99)
GLUCOSE SERPL-MCNC: 202 MG/DL (ref 74–99)
HBA1C MFR BLD: 7.3 %
HCT VFR BLD AUTO: 31.5 % (ref 36–46)
HGB BLD-MCNC: 10.4 G/DL (ref 12–16)
MAGNESIUM SERPL-MCNC: 1.91 MG/DL (ref 1.6–2.4)
MCH RBC QN AUTO: 32.5 PG (ref 26–34)
MCHC RBC AUTO-ENTMCNC: 33 G/DL (ref 32–36)
MCV RBC AUTO: 98 FL (ref 80–100)
NRBC BLD-RTO: 0 /100 WBCS (ref 0–0)
PHOSPHATE SERPL-MCNC: 3.7 MG/DL (ref 2.5–4.9)
PLATELET # BLD AUTO: 114 X10*3/UL (ref 150–450)
POTASSIUM SERPL-SCNC: 4.5 MMOL/L (ref 3.5–5.3)
RBC # BLD AUTO: 3.2 X10*6/UL (ref 4–5.2)
SARS-COV-2 RNA RESP QL NAA+PROBE: NOT DETECTED
SODIUM SERPL-SCNC: 135 MMOL/L (ref 136–145)
TSH SERPL-ACNC: 1.04 MIU/L (ref 0.44–3.98)
TSH SERPL-ACNC: 1.04 MIU/L (ref 0.44–3.98)
VIT B12 SERPL-MCNC: 140 PG/ML (ref 211–911)
WBC # BLD AUTO: 6.3 X10*3/UL (ref 4.4–11.3)

## 2024-01-27 PROCEDURE — 2500000002 HC RX 250 W HCPCS SELF ADMINISTERED DRUGS (ALT 637 FOR MEDICARE OP, ALT 636 FOR OP/ED): Performed by: INTERNAL MEDICINE

## 2024-01-27 PROCEDURE — 82607 VITAMIN B-12: CPT | Performed by: INTERNAL MEDICINE

## 2024-01-27 PROCEDURE — 2500000001 HC RX 250 WO HCPCS SELF ADMINISTERED DRUGS (ALT 637 FOR MEDICARE OP): Performed by: INTERNAL MEDICINE

## 2024-01-27 PROCEDURE — 85027 COMPLETE CBC AUTOMATED: CPT | Performed by: INTERNAL MEDICINE

## 2024-01-27 PROCEDURE — G0378 HOSPITAL OBSERVATION PER HR: HCPCS

## 2024-01-27 PROCEDURE — 84443 ASSAY THYROID STIM HORMONE: CPT | Performed by: INTERNAL MEDICINE

## 2024-01-27 PROCEDURE — 2500000004 HC RX 250 GENERAL PHARMACY W/ HCPCS (ALT 636 FOR OP/ED): Performed by: INTERNAL MEDICINE

## 2024-01-27 PROCEDURE — 80069 RENAL FUNCTION PANEL: CPT | Performed by: INTERNAL MEDICINE

## 2024-01-27 PROCEDURE — 82947 ASSAY GLUCOSE BLOOD QUANT: CPT

## 2024-01-27 PROCEDURE — 87636 SARSCOV2 & INF A&B AMP PRB: CPT | Performed by: EMERGENCY MEDICINE

## 2024-01-27 PROCEDURE — 83036 HEMOGLOBIN GLYCOSYLATED A1C: CPT | Performed by: INTERNAL MEDICINE

## 2024-01-27 PROCEDURE — 99223 1ST HOSP IP/OBS HIGH 75: CPT | Performed by: INTERNAL MEDICINE

## 2024-01-27 PROCEDURE — 83735 ASSAY OF MAGNESIUM: CPT | Performed by: INTERNAL MEDICINE

## 2024-01-27 PROCEDURE — 36415 COLL VENOUS BLD VENIPUNCTURE: CPT | Performed by: INTERNAL MEDICINE

## 2024-01-27 PROCEDURE — 96372 THER/PROPH/DIAG INJ SC/IM: CPT

## 2024-01-27 RX ORDER — CARVEDILOL 12.5 MG/1
12.5 TABLET ORAL
Status: DISCONTINUED | OUTPATIENT
Start: 2024-01-27 | End: 2024-02-02 | Stop reason: HOSPADM

## 2024-01-27 RX ORDER — DEXTROSE 50 % IN WATER (D50W) INTRAVENOUS SYRINGE
25
Status: DISCONTINUED | OUTPATIENT
Start: 2024-01-27 | End: 2024-02-02 | Stop reason: HOSPADM

## 2024-01-27 RX ORDER — LISINOPRIL 20 MG/1
20 TABLET ORAL 2 TIMES DAILY
Status: DISCONTINUED | OUTPATIENT
Start: 2024-01-27 | End: 2024-02-02 | Stop reason: HOSPADM

## 2024-01-27 RX ORDER — HYDRALAZINE HYDROCHLORIDE 20 MG/ML
5 INJECTION INTRAMUSCULAR; INTRAVENOUS EVERY 6 HOURS PRN
Status: DISCONTINUED | OUTPATIENT
Start: 2024-01-27 | End: 2024-02-02 | Stop reason: HOSPADM

## 2024-01-27 RX ORDER — INSULIN LISPRO 100 [IU]/ML
0-5 INJECTION, SOLUTION INTRAVENOUS; SUBCUTANEOUS
Status: DISCONTINUED | OUTPATIENT
Start: 2024-01-27 | End: 2024-02-02 | Stop reason: HOSPADM

## 2024-01-27 RX ORDER — ATORVASTATIN CALCIUM 40 MG/1
80 TABLET, FILM COATED ORAL NIGHTLY
Status: DISCONTINUED | OUTPATIENT
Start: 2024-01-27 | End: 2024-02-02 | Stop reason: HOSPADM

## 2024-01-27 RX ORDER — SODIUM CHLORIDE, SODIUM LACTATE, POTASSIUM CHLORIDE, CALCIUM CHLORIDE 600; 310; 30; 20 MG/100ML; MG/100ML; MG/100ML; MG/100ML
75 INJECTION, SOLUTION INTRAVENOUS CONTINUOUS
Status: ACTIVE | OUTPATIENT
Start: 2024-01-27 | End: 2024-01-27

## 2024-01-27 RX ORDER — DEXTROSE MONOHYDRATE 100 MG/ML
0.3 INJECTION, SOLUTION INTRAVENOUS ONCE AS NEEDED
Status: DISCONTINUED | OUTPATIENT
Start: 2024-01-27 | End: 2024-02-02 | Stop reason: HOSPADM

## 2024-01-27 RX ORDER — ONDANSETRON HYDROCHLORIDE 2 MG/ML
4 INJECTION, SOLUTION INTRAVENOUS EVERY 6 HOURS PRN
Status: DISCONTINUED | OUTPATIENT
Start: 2024-01-27 | End: 2024-02-02 | Stop reason: HOSPADM

## 2024-01-27 RX ORDER — ACETAMINOPHEN 325 MG/1
650 TABLET ORAL EVERY 4 HOURS PRN
Status: DISCONTINUED | OUTPATIENT
Start: 2024-01-27 | End: 2024-02-02 | Stop reason: HOSPADM

## 2024-01-27 RX ORDER — ASPIRIN 81 MG/1
81 TABLET ORAL DAILY
Status: DISCONTINUED | OUTPATIENT
Start: 2024-01-27 | End: 2024-02-02 | Stop reason: HOSPADM

## 2024-01-27 RX ADMIN — ATORVASTATIN CALCIUM 80 MG: 40 TABLET, FILM COATED ORAL at 21:30

## 2024-01-27 RX ADMIN — SODIUM CHLORIDE, POTASSIUM CHLORIDE, SODIUM LACTATE AND CALCIUM CHLORIDE 75 ML/HR: 600; 310; 30; 20 INJECTION, SOLUTION INTRAVENOUS at 04:38

## 2024-01-27 RX ADMIN — LISINOPRIL 20 MG: 20 TABLET ORAL at 12:33

## 2024-01-27 RX ADMIN — LISINOPRIL 20 MG: 20 TABLET ORAL at 21:30

## 2024-01-27 RX ADMIN — INSULIN LISPRO 1 UNITS: 100 INJECTION, SOLUTION INTRAVENOUS; SUBCUTANEOUS at 08:08

## 2024-01-27 RX ADMIN — CARVEDILOL 12.5 MG: 12.5 TABLET, FILM COATED ORAL at 08:09

## 2024-01-27 RX ADMIN — ASPIRIN 81 MG: 81 TABLET, COATED ORAL at 12:31

## 2024-01-27 RX ADMIN — INSULIN LISPRO 1 UNITS: 100 INJECTION, SOLUTION INTRAVENOUS; SUBCUTANEOUS at 12:31

## 2024-01-27 RX ADMIN — INSULIN LISPRO 2 UNITS: 100 INJECTION, SOLUTION INTRAVENOUS; SUBCUTANEOUS at 16:14

## 2024-01-27 SDOH — HEALTH STABILITY: MENTAL HEALTH: HOW MANY STANDARD DRINKS CONTAINING ALCOHOL DO YOU HAVE ON A TYPICAL DAY?: PATIENT DOES NOT DRINK

## 2024-01-27 SDOH — HEALTH STABILITY: MENTAL HEALTH: HOW OFTEN DO YOU HAVE A DRINK CONTAINING ALCOHOL?: NEVER

## 2024-01-27 SDOH — SOCIAL STABILITY: SOCIAL INSECURITY: DO YOU FEEL UNSAFE GOING BACK TO THE PLACE WHERE YOU ARE LIVING?: NO

## 2024-01-27 SDOH — HEALTH STABILITY: MENTAL HEALTH: HOW OFTEN DO YOU HAVE 6 OR MORE DRINKS ON ONE OCCASION?: NEVER

## 2024-01-27 SDOH — SOCIAL STABILITY: SOCIAL INSECURITY: ARE YOU OR HAVE YOU BEEN THREATENED OR ABUSED PHYSICALLY, EMOTIONALLY, OR SEXUALLY BY ANYONE?: NO

## 2024-01-27 SDOH — SOCIAL STABILITY: SOCIAL INSECURITY: HAS ANYONE EVER THREATENED TO HURT YOUR FAMILY OR YOUR PETS?: NO

## 2024-01-27 SDOH — SOCIAL STABILITY: SOCIAL INSECURITY: DO YOU FEEL ANYONE HAS EXPLOITED OR TAKEN ADVANTAGE OF YOU FINANCIALLY OR OF YOUR PERSONAL PROPERTY?: NO

## 2024-01-27 SDOH — SOCIAL STABILITY: SOCIAL INSECURITY: ARE THERE ANY APPARENT SIGNS OF INJURIES/BEHAVIORS THAT COULD BE RELATED TO ABUSE/NEGLECT?: NO

## 2024-01-27 SDOH — SOCIAL STABILITY: SOCIAL INSECURITY: WERE YOU ABLE TO COMPLETE ALL THE BEHAVIORAL HEALTH SCREENINGS?: YES

## 2024-01-27 SDOH — SOCIAL STABILITY: SOCIAL INSECURITY: DOES ANYONE TRY TO KEEP YOU FROM HAVING/CONTACTING OTHER FRIENDS OR DOING THINGS OUTSIDE YOUR HOME?: NO

## 2024-01-27 SDOH — SOCIAL STABILITY: SOCIAL INSECURITY: ABUSE: ADULT

## 2024-01-27 SDOH — SOCIAL STABILITY: SOCIAL INSECURITY: HAVE YOU HAD THOUGHTS OF HARMING ANYONE ELSE?: NO

## 2024-01-27 ASSESSMENT — ACTIVITIES OF DAILY LIVING (ADL)
FEEDING YOURSELF: INDEPENDENT
WALKS IN HOME: NEEDS ASSISTANCE
ADEQUATE_TO_COMPLETE_ADL: YES
HEARING - RIGHT EAR: FUNCTIONAL
DRESSING YOURSELF: INDEPENDENT
GROOMING: INDEPENDENT
ASSISTIVE_DEVICE: WALKER
LACK_OF_TRANSPORTATION: NO
BATHING: NEEDS ASSISTANCE
TOILETING: NEEDS ASSISTANCE
PATIENT'S MEMORY ADEQUATE TO SAFELY COMPLETE DAILY ACTIVITIES?: YES
JUDGMENT_ADEQUATE_SAFELY_COMPLETE_DAILY_ACTIVITIES: YES
HEARING - LEFT EAR: FUNCTIONAL

## 2024-01-27 ASSESSMENT — LIFESTYLE VARIABLES
SKIP TO QUESTIONS 9-10: 1
HOW MANY STANDARD DRINKS CONTAINING ALCOHOL DO YOU HAVE ON A TYPICAL DAY: PATIENT DOES NOT DRINK
HOW OFTEN DO YOU HAVE A DRINK CONTAINING ALCOHOL: NEVER
HOW OFTEN DO YOU HAVE 6 OR MORE DRINKS ON ONE OCCASION: NEVER
AUDIT-C TOTAL SCORE: 0
AUDIT-C TOTAL SCORE: 0
SKIP TO QUESTIONS 9-10: 1
AUDIT-C TOTAL SCORE: 0

## 2024-01-27 ASSESSMENT — COGNITIVE AND FUNCTIONAL STATUS - GENERAL
CLIMB 3 TO 5 STEPS WITH RAILING: A LOT
TOILETING: A LITTLE
WALKING IN HOSPITAL ROOM: A LITTLE
DRESSING REGULAR LOWER BODY CLOTHING: A LITTLE
WALKING IN HOSPITAL ROOM: A LOT
STANDING UP FROM CHAIR USING ARMS: A LITTLE
TURNING FROM BACK TO SIDE WHILE IN FLAT BAD: A LITTLE
PATIENT BASELINE BEDBOUND: NO
DRESSING REGULAR UPPER BODY CLOTHING: A LITTLE
DAILY ACTIVITIY SCORE: 19
MOVING TO AND FROM BED TO CHAIR: A LITTLE
MOVING TO AND FROM BED TO CHAIR: A LITTLE
DAILY ACTIVITIY SCORE: 19
TOILETING: A LITTLE
STANDING UP FROM CHAIR USING ARMS: A LITTLE
MOBILITY SCORE: 16
DRESSING REGULAR UPPER BODY CLOTHING: A LITTLE
HELP NEEDED FOR BATHING: A LITTLE
DRESSING REGULAR LOWER BODY CLOTHING: A LOT
PERSONAL GROOMING: A LITTLE
MOBILITY SCORE: 18
TURNING FROM BACK TO SIDE WHILE IN FLAT BAD: A LITTLE
MOVING FROM LYING ON BACK TO SITTING ON SIDE OF FLAT BED WITH BEDRAILS: A LITTLE
CLIMB 3 TO 5 STEPS WITH RAILING: A LOT
HELP NEEDED FOR BATHING: A LITTLE

## 2024-01-27 ASSESSMENT — ENCOUNTER SYMPTOMS
VOMITING: 0
SHORTNESS OF BREATH: 0
CHEST TIGHTNESS: 0
ABDOMINAL DISTENTION: 0
CHILLS: 0
PALPITATIONS: 0
SINUS PRESSURE: 0
DIARRHEA: 0
FACIAL ASYMMETRY: 0
DIZZINESS: 0
TREMORS: 0
NAUSEA: 0
JOINT SWELLING: 0
ANAL BLEEDING: 1
CHOKING: 0
ARTHRALGIAS: 0
WHEEZING: 0
MYALGIAS: 0
CONSTIPATION: 0
SINUS PAIN: 0
WEAKNESS: 1
BLOOD IN STOOL: 0
FEVER: 0

## 2024-01-27 ASSESSMENT — PATIENT HEALTH QUESTIONNAIRE - PHQ9
1. LITTLE INTEREST OR PLEASURE IN DOING THINGS: NOT AT ALL
2. FEELING DOWN, DEPRESSED OR HOPELESS: NOT AT ALL
SUM OF ALL RESPONSES TO PHQ9 QUESTIONS 1 & 2: 0

## 2024-01-27 ASSESSMENT — PAIN SCALES - GENERAL
PAINLEVEL_OUTOF10: 0 - NO PAIN

## 2024-01-27 ASSESSMENT — PAIN - FUNCTIONAL ASSESSMENT
PAIN_FUNCTIONAL_ASSESSMENT: 0-10

## 2024-01-27 NOTE — ASSESSMENT & PLAN NOTE
PT and OT assessment.  TSH OK.  May need to consider SNF on discharge.  CT of head and neck without injury.  Will check orthostatic vital signs.  Awaiting PT and OT assessments.  Discussed with family at bedside.  B12 is low, will replace.

## 2024-01-27 NOTE — ED PROVIDER NOTES
Andree Johnson is a 88 y.o. patient presenting to the ED for fall.  The patient states she does not know what caused her to fall.  She denies loss of consciousness.  She denies injury.  She is not able to provide further history.    Additional History Obtained from: EMS-son reports the patient has recently had black tarry stools.  Is unclear how long this has been going on.  Limitations to History: Patient has not able to provide many details  ------------------------------------------------------------------------------------------------------------------------------------------  Physical Exam:  Appearance: Alert, cooperative, no apparent distress.  Skin: Warm, dry, appropriate color for ethnicity.  Eyes: Cornea clear. No scleral icterus or injection.   ENT: Mucous membranes moist.  Pulmonary: No accessory muscle use or stridor. Clear lung sounds bilaterally without rhonchi or wheezing.   Cardiac: Heart sounds regular without murmur. B/L radial pulses full and symmetric.  2+ lower extremity pitting edema bilaterally.  Abdomen: Soft, not tender.  No rebound or guarding.   Musculoskeletal: No gross deformities.   Neurological: Face symmetrical. Voice clear. Appropriately conversant.   Psychiatric: Appropriate mood and affect.    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    Social Determinants of Health Significantly Affecting Care: Lives alone    Differential Diagnosis Considered but not limited to: GI bleeding, anemia, mechanical fall, less likely syncope.  No clear evidence of injury on exam.      External Records Reviewed:   I reviewed recent and relevant outside records including:     Independent Interpretation of Studies: The following studies were ordered as part of the emergency department work up and independently interpreted by me. See ED Course for details.    ED Course as of 01/29/24 1117   Fri Jan 26, 2024 2132 EKG performed at 2115 and interpreted by me shows sinus rhythm at a rate of 65.  There is left bundle branch block.  No ST elevation or depression.  No T wave changes.  No STEMI. [SP]   2243 CBC with hemoglobin 11.7 which is down from 12.23 months ago.  Platelets are 129.  White blood cell count is normal. [SP]   2328 CT head and C-spine without any evidence of fracture or acute intracranial hemorrhage. [SP]   2329 CMP with mildly abnormal renal function and GFR 46.  Sodium is 133.  Potassium is 5.5 but hemolyzed. [SP]   Sat Jan 27, 2024   0019 Patient was unable to ambulate, even with single person assisting.  Because of this I do not believe she is safe to be discharged home.  Hospitalist will be contacted for admission. [SP]      ED Course User Index  [SP] Kathy Braxton DO         Diagnoses as of 01/29/24 1117   Fall, initial encounter   Inability to walk   Generalized weakness         Escalation of Care:  Patient will home alone and is not able to ambulate.  I did contact the hospitalist for admission because of this.      Discussion of Management with Other Providers:   I discussed the patient/results with: Dr. Chaim Braxton DO  01/29/24 1118

## 2024-01-27 NOTE — NURSING NOTE
Patient admitted in to room 2326.   A&Ox4, oriented to room and call light.   Admission paperwork and med rec completed.

## 2024-01-27 NOTE — ASSESSMENT & PLAN NOTE
Patient reports some bright red blood when she has a bowel movement.  Appears to be scant amounts.  History of hemorrhoids.  Currently on aspirin and Plavix by records.  Will need to confirm.  Continue low-dose aspirin for now.  Hemoglobins have been higher in the past.  Will continue to monitor for now.  Hold on GI evaluation at this time.

## 2024-01-27 NOTE — H&P
History Of Present Illness  Andree Johnson is a 88 y.o. female presenting with recurrent fall.    88-year-old female admitted 1/27/2024 secondary to recurrent falls at home.  PMH is significant for chronic systolic heart failure, DM2, HTN, HPL, mild aortic AAS, B12 deficiency, vitamin D deficiency.  Patient apparently fell at home around 1901/26.  Patient states she was returning to her bed and was going to lie down.  Uses a rollator to ambulate and states the rollator scooted away from her.  Was brought to the ED for evaluation.  Patient states she does not typically fall but history provided notes that she has had multiple episodes of falling.  Lives by herself although son lives very close.  ED nursing reports they were barely able to get her up and out of bed.  Admitting to evaluate for recurrent fall.  Will have PT assess patient.  CT scan of head and neck was negative for acute injury.  Laboratory results note mild anemia.  Hemoglobin is 10.4.  Patient reports some bright red blood when she has a BM.  It is scant amounts and she does have a history of hemorrhoids.  Currently on DAPT by records.  Will need to verify this is the case.  Hold Plavix for now but continue aspirin.  States her glucoses have been controlled.  Will await input from PT and OT.  May need to consider skilled nursing on discharge.      ED course:      Past Medical History  She has a past medical history of Abnormal findings on diagnostic imaging of heart and coronary circulation (12/04/2018), Coronary angioplasty status (12/04/2018), Glaucoma secondary to eye trauma, right eye, mild stage (12/04/2018), Personal history of malignant neoplasm of breast (12/04/2018), Personal history of other diseases of the digestive system (12/04/2018), Personal history of other diseases of the musculoskeletal system and connective tissue, Personal history of other diseases of the musculoskeletal system and connective tissue (12/04/2018), Personal history  of other diseases of the nervous system and sense organs (12/04/2018), and Personal history of other specified conditions.    Surgical History  She has a past surgical history that includes Other surgical history (12/04/2018); Other surgical history (12/04/2018); Other surgical history (12/04/2018); Other surgical history (12/04/2018); Other surgical history (12/04/2018); Other surgical history (12/04/2018); Other surgical history (12/04/2018); and Other surgical history (12/04/2018).     Social History  She reports that she has never smoked. She has never used smokeless tobacco. She reports that she does not drink alcohol and does not use drugs.    Family History  Family History   Problem Relation Name Age of Onset    Hypertension Mother      Breast cancer Neg Hx          Allergies  Adhesive tape-silicones, Metformin, and Shellfish containing products    Code Status  Full Code     Review of Systems   Constitutional:  Negative for chills and fever.   HENT:  Negative for congestion, sinus pressure, sinus pain and tinnitus.    Respiratory:  Negative for choking, chest tightness, shortness of breath and wheezing.    Cardiovascular:  Negative for chest pain, palpitations and leg swelling.   Gastrointestinal:  Positive for anal bleeding. Negative for abdominal distention, blood in stool, constipation, diarrhea, nausea and vomiting.   Musculoskeletal:  Positive for gait problem. Negative for arthralgias, joint swelling and myalgias.   Neurological:  Positive for weakness. Negative for dizziness, tremors, syncope and facial asymmetry.   All other systems reviewed and are negative.      Last Recorded Vitals  BP (!) 189/76   Pulse 65   Temp 36.2 °C (97.2 °F)   Resp 14   Wt 115 kg (253 lb 8.5 oz)   SpO2 94%      Physical Exam     Relevant Results  Results for orders placed or performed during the hospital encounter of 01/26/24 (from the past 24 hour(s))   Magnesium   Result Value Ref Range    Magnesium 2.09 1.60 - 2.40  mg/dL   Comprehensive metabolic panel   Result Value Ref Range    Glucose 254 (H) 74 - 99 mg/dL    Sodium 133 (L) 136 - 145 mmol/L    Potassium 5.5 (H) 3.5 - 5.3 mmol/L    Chloride 101 98 - 107 mmol/L    Bicarbonate 25 21 - 32 mmol/L    Anion Gap 13 10 - 20 mmol/L    Urea Nitrogen 36 (H) 6 - 23 mg/dL    Creatinine 1.15 (H) 0.50 - 1.05 mg/dL    eGFR 46 (L) >60 mL/min/1.73m*2    Calcium 8.4 (L) 8.6 - 10.3 mg/dL    Albumin 3.3 (L) 3.4 - 5.0 g/dL    Alkaline Phosphatase 51 33 - 136 U/L    Total Protein 5.9 (L) 6.4 - 8.2 g/dL    AST 17 9 - 39 U/L    Bilirubin, Total 1.3 (H) 0.0 - 1.2 mg/dL    ALT 11 7 - 45 U/L   Protime-INR   Result Value Ref Range    Protime 11.7 9.8 - 12.8 seconds    INR 1.0 0.9 - 1.1   CBC and Auto Differential   Result Value Ref Range    WBC 4.9 4.4 - 11.3 x10*3/uL    nRBC 0.0 0.0 - 0.0 /100 WBCs    RBC 3.65 (L) 4.00 - 5.20 x10*6/uL    Hemoglobin 11.7 (L) 12.0 - 16.0 g/dL    Hematocrit 35.8 (L) 36.0 - 46.0 %    MCV 98 80 - 100 fL    MCH 32.1 26.0 - 34.0 pg    MCHC 32.7 32.0 - 36.0 g/dL    RDW 14.6 (H) 11.5 - 14.5 %    Platelets 129 (L) 150 - 450 x10*3/uL    Neutrophils % 59.7 40.0 - 80.0 %    Immature Granulocytes %, Automated 0.4 0.0 - 0.9 %    Lymphocytes % 21.8 13.0 - 44.0 %    Monocytes % 10.7 2.0 - 10.0 %    Eosinophils % 6.8 0.0 - 6.0 %    Basophils % 0.6 0.0 - 2.0 %    Neutrophils Absolute 2.90 1.60 - 5.50 x10*3/uL    Immature Granulocytes Absolute, Automated 0.02 0.00 - 0.50 x10*3/uL    Lymphocytes Absolute 1.06 0.80 - 3.00 x10*3/uL    Monocytes Absolute 0.52 0.05 - 0.80 x10*3/uL    Eosinophils Absolute 0.33 0.00 - 0.40 x10*3/uL    Basophils Absolute 0.03 0.00 - 0.10 x10*3/uL   Influenza A, and B PCR   Result Value Ref Range    Flu A Result Not Detected Not Detected    Flu B Result Not Detected Not Detected   SARS-CoV-2 RT PCR   Result Value Ref Range    Coronavirus 2019, PCR Not Detected Not Detected   CBC   Result Value Ref Range    WBC 6.3 4.4 - 11.3 x10*3/uL    nRBC 0.0 0.0 - 0.0 /100  WBCs    RBC 3.20 (L) 4.00 - 5.20 x10*6/uL    Hemoglobin 10.4 (L) 12.0 - 16.0 g/dL    Hematocrit 31.5 (L) 36.0 - 46.0 %    MCV 98 80 - 100 fL    MCH 32.5 26.0 - 34.0 pg    MCHC 33.0 32.0 - 36.0 g/dL    RDW 14.6 (H) 11.5 - 14.5 %    Platelets 114 (L) 150 - 450 x10*3/uL   Renal Function Panel   Result Value Ref Range    Glucose 202 (H) 74 - 99 mg/dL    Sodium 135 (L) 136 - 145 mmol/L    Potassium 4.5 3.5 - 5.3 mmol/L    Chloride 103 98 - 107 mmol/L    Bicarbonate 27 21 - 32 mmol/L    Anion Gap 10 10 - 20 mmol/L    Urea Nitrogen 34 (H) 6 - 23 mg/dL    Creatinine 0.98 0.50 - 1.05 mg/dL    eGFR 56 (L) >60 mL/min/1.73m*2    Calcium 8.1 (L) 8.6 - 10.3 mg/dL    Phosphorus 3.7 2.5 - 4.9 mg/dL    Albumin 3.0 (L) 3.4 - 5.0 g/dL   Magnesium   Result Value Ref Range    Magnesium 1.91 1.60 - 2.40 mg/dL   Hemoglobin A1C   Result Value Ref Range    Hemoglobin A1C 7.3 (H) see below %    Estimated Average Glucose 163 Not Established mg/dL   TSH with reflex to Free T4 if abnormal   Result Value Ref Range    Thyroid Stimulating Hormone 1.04 0.44 - 3.98 mIU/L   TSH   Result Value Ref Range    Thyroid Stimulating Hormone 1.04 0.44 - 3.98 mIU/L   POCT GLUCOSE   Result Value Ref Range    POCT Glucose 168 (H) 74 - 99 mg/dL      CT cervical spine wo IV contrast    Result Date: 1/26/2024  Interpreted By:  Valerio Arambula, STUDY: CT HEAD WO IV CONTRAST; CT CERVICAL SPINE WO IV CONTRAST;  1/26/2024 10:16 pm   INDICATION: Signs/Symptoms:fall   COMPARISON: None.   ACCESSION NUMBER(S): [id number]; [id number]   ORDERING CLINICIAN: LINDEN RUTH   TECHNIQUE: Axial noncontrast CT images of head with coronal and sagittal reconstructed images. Axial noncontrast CT images of the cervical spine with coronal and sagittal reconstructed images.   FINDINGS: CT HEAD:   BRAIN PARENCHYMA:  No evidence of acute intraparenchymal hemorrhage or parenchymal evidence of acute large territory ischemic infarct. No mass-effect, midline shift or effacement of cerebral  sulci. Gray-white matter distinction is preserved. Global volume loss and senescent changes again seen.     VENTRICLES and EXTRA-AXIAL SPACES:  No acute extra-axial or intraventricular hemorrhage. Ventricles and sulci are age-concordant.   PARANASAL SINUSES/MASTOIDS:  No hemorrhage or air-fluid levels within the visualized paranasal sinuses. The mastoid air cells are well-aerated.   CALVARIUM/ORBITS:  Demineralization. Vascular calcification.       CT CERVICAL SPINE: Demineralization   PREVERTEBRAL SOFT TISSUES: Within normal limits.   CRANIOCERVICAL JUNCTION: Intact.   ALIGNMENT:  No traumatic malalignment or traumatic facet widening.   VERTEBRAE:  No acute fracture. Vertebral body heights are maintained.   Mild degenerative changes seen without evidence of acute cervical spine fracture.   Vascular calcifications are seen       CT HEAD: Senescent changes. No findings of acute intracranial abnormality     CT CERVICAL SPINE: No findings of acute cervical spine fracture. Demineralization and mild degenerative changes.   Signed by: Valerio Arambula 1/26/2024 11:06 PM Dictation workstation:   [id number]    CT head wo IV contrast    Result Date: 1/26/2024  Interpreted By:  Valerio Arambula, STUDY: CT HEAD WO IV CONTRAST; CT CERVICAL SPINE WO IV CONTRAST;  1/26/2024 10:16 pm   INDICATION: Signs/Symptoms:fall   COMPARISON: None.   ACCESSION NUMBER(S): [id number]; [id number]   ORDERING CLINICIAN: LINDEN RUTH   TECHNIQUE: Axial noncontrast CT images of head with coronal and sagittal reconstructed images. Axial noncontrast CT images of the cervical spine with coronal and sagittal reconstructed images.   FINDINGS: CT HEAD:   BRAIN PARENCHYMA:  No evidence of acute intraparenchymal hemorrhage or parenchymal evidence of acute large territory ischemic infarct. No mass-effect, midline shift or effacement of cerebral sulci. Gray-white matter distinction is preserved. Global volume loss and senescent changes again seen.      VENTRICLES and EXTRA-AXIAL SPACES:  No acute extra-axial or intraventricular hemorrhage. Ventricles and sulci are age-concordant.   PARANASAL SINUSES/MASTOIDS:  No hemorrhage or air-fluid levels within the visualized paranasal sinuses. The mastoid air cells are well-aerated.   CALVARIUM/ORBITS:  Demineralization. Vascular calcification.       CT CERVICAL SPINE: Demineralization   PREVERTEBRAL SOFT TISSUES: Within normal limits.   CRANIOCERVICAL JUNCTION: Intact.   ALIGNMENT:  No traumatic malalignment or traumatic facet widening.   VERTEBRAE:  No acute fracture. Vertebral body heights are maintained.   Mild degenerative changes seen without evidence of acute cervical spine fracture.   Vascular calcifications are seen       CT HEAD: Senescent changes. No findings of acute intracranial abnormality     CT CERVICAL SPINE: No findings of acute cervical spine fracture. Demineralization and mild degenerative changes.   Signed by: Valerio Arambula 1/26/2024 11:06 PM Dictation workstation:   [id number]     Scheduled medications:  aspirin, 81 mg, oral, Daily  atorvastatin, 80 mg, oral, Nightly  carvedilol, 12.5 mg, oral, BID with meals  insulin lispro, 0-5 Units, subcutaneous, With meals & nightly  lisinopril, 20 mg, oral, BID      Continuous medications:  lactated Ringer's, 75 mL/hr, Last Rate: 75 mL/hr (01/27/24 0845)      PRN medications:  PRN medications: acetaminophen, dextrose 10 % in water (D10W), dextrose, glucagon, hydrALAZINE, ondansetron        * Ambulatory dysfunction  Assessment & Plan  PT and OT assessment.  Check B12 and TSH.  May need to consider SNF on discharge.  CT of head and neck without injury.  Will check orthostatic vital signs    Melena  Assessment & Plan  Patient reports some bright red blood when she has a bowel movement.  Appears to be scant amounts.  History of hemorrhoids.  Currently on aspirin and Plavix by records.  Will need to confirm.  Continue low-dose aspirin for now.  Hemoglobins have  been higher in the past.  Will continue to monitor for now.  Hold on GI evaluation at this time.    Aortic valve stenosis  Assessment & Plan  Mild aortic stenosis on echo 8/2023.    Hypertension, benign  Assessment & Plan  Running a little high.  Check for orthostatic hypotension.    Diabetes mellitus, type 2 (CMS/HCC)  Assessment & Plan  On glipizide and pioglitazone as outpatient.  Holding OHG for now.  Covering with SSI.    Hyperlipidemia  Assessment & Plan  Continue home meds.    Cardiomyopathy (CMS/[facility])  Assessment & Plan  EF 50% with global hypokineses on echo 8/2023    CAD in native artery  Assessment & Plan  Continue treatment.    B12 deficiency anemia  Assessment & Plan  Recheck level.           Valerio Head MD

## 2024-01-27 NOTE — PROGRESS NOTES
Andree Johnson is a 88 y.o. female admitted for Ambulatory dysfunction. Pharmacy reviewed the patient's [id number] medications and allergies for accuracy.    The list below reflects the PTA list prior to pharmacy medication history. A summary a changes to the PTA medication list has been listed below. Please review each medication in order reconciliation for additional clarification and justification.    Medications added: N/A    Medications modified: N/A    Medications to be removed: N/A    Medications of concern: N/A      Prior to Admission Medications   Prescriptions Last Dose Informant Patient Reported? Taking?   acetaminophen (Tylenol) 325 mg tablet   Yes No   Sig: Take by mouth if needed. OTC   aspirin 81 mg EC tablet   Yes No   Sig: Take 1 tablet (81 mg) by mouth once daily. OTC   atorvastatin (Lipitor) 80 mg tablet   Yes No   Sig: Take 1 tablet (80 mg) by mouth once daily at bedtime. Dr Gill   blood sugar diagnostic (OneTouch Ultra Test) strip   No No   Si each by Not Applicable route once daily. Which ever brand preferred by patient and insurance coverage   carvedilol (Coreg) 12.5 mg tablet   Yes No   Sig: Take 1 tablet (12.5 mg) by mouth 2 times a day with meals. Dr Gill   cholecalciferol (Vitamin D-3) 125 MCG (5000 UT) capsule   Yes No   Sig: Take by mouth once daily. OTC   clopidogrel (Plavix) 75 mg tablet   Yes No   Sig: Take 1 tablet (75 mg) by mouth once daily. Dr Gill   furosemide (Lasix) 20 mg tablet   No No   Sig: Take 1 tablet (20 mg) by mouth once daily.   glimepiride (Amaryl) 4 mg tablet   No No   Sig: Take 1 tablet (4 mg) by mouth 2 times a day. Dr Gray   linaGLIPtin (Tradjenta) 5 mg tablet   No No   Sig: Take 1 tablet (5 mg) by mouth once daily. Dr Gray   lisinopril 20 mg tablet   Yes No   Sig: Take 1 tablet (20 mg) by mouth 2 times a day. Dr Gill   pioglitazone (Actos) 30 mg tablet   No No   Sig: Take 1 tablet (30 mg) by mouth once daily.   polyethylene glycol (Glycolax)  17 gram/dose powder   Yes No   Sig: Take by mouth. Dr Gill      Facility-Administered Medications: None       Renetta Hurst CPhT

## 2024-01-28 LAB
GLUCOSE BLD MANUAL STRIP-MCNC: 135 MG/DL (ref 74–99)
GLUCOSE BLD MANUAL STRIP-MCNC: 158 MG/DL (ref 74–99)
GLUCOSE BLD MANUAL STRIP-MCNC: 182 MG/DL (ref 74–99)
GLUCOSE BLD MANUAL STRIP-MCNC: 238 MG/DL (ref 74–99)

## 2024-01-28 PROCEDURE — 82947 ASSAY GLUCOSE BLOOD QUANT: CPT

## 2024-01-28 PROCEDURE — G0378 HOSPITAL OBSERVATION PER HR: HCPCS

## 2024-01-28 PROCEDURE — 2500000001 HC RX 250 WO HCPCS SELF ADMINISTERED DRUGS (ALT 637 FOR MEDICARE OP): Performed by: INTERNAL MEDICINE

## 2024-01-28 PROCEDURE — 2500000002 HC RX 250 W HCPCS SELF ADMINISTERED DRUGS (ALT 637 FOR MEDICARE OP, ALT 636 FOR OP/ED): Performed by: INTERNAL MEDICINE

## 2024-01-28 PROCEDURE — 99233 SBSQ HOSP IP/OBS HIGH 50: CPT | Performed by: INTERNAL MEDICINE

## 2024-01-28 RX ORDER — LANOLIN ALCOHOL/MO/W.PET/CERES
1000 CREAM (GRAM) TOPICAL DAILY
Status: DISCONTINUED | OUTPATIENT
Start: 2024-01-28 | End: 2024-02-02 | Stop reason: HOSPADM

## 2024-01-28 RX ADMIN — CARVEDILOL 12.5 MG: 12.5 TABLET, FILM COATED ORAL at 17:52

## 2024-01-28 RX ADMIN — LISINOPRIL 20 MG: 20 TABLET ORAL at 08:21

## 2024-01-28 RX ADMIN — ASPIRIN 81 MG: 81 TABLET, COATED ORAL at 08:21

## 2024-01-28 RX ADMIN — ATORVASTATIN CALCIUM 80 MG: 40 TABLET, FILM COATED ORAL at 20:15

## 2024-01-28 RX ADMIN — INSULIN LISPRO 1 UNITS: 100 INJECTION, SOLUTION INTRAVENOUS; SUBCUTANEOUS at 11:50

## 2024-01-28 RX ADMIN — CARVEDILOL 12.5 MG: 12.5 TABLET, FILM COATED ORAL at 08:21

## 2024-01-28 RX ADMIN — CYANOCOBALAMIN TAB 1000 MCG 1000 MCG: 1000 TAB at 13:19

## 2024-01-28 RX ADMIN — INSULIN LISPRO 2 UNITS: 100 INJECTION, SOLUTION INTRAVENOUS; SUBCUTANEOUS at 20:15

## 2024-01-28 ASSESSMENT — COGNITIVE AND FUNCTIONAL STATUS - GENERAL
MOVING TO AND FROM BED TO CHAIR: A LITTLE
TURNING FROM BACK TO SIDE WHILE IN FLAT BAD: A LITTLE
CLIMB 3 TO 5 STEPS WITH RAILING: TOTAL
DAILY ACTIVITIY SCORE: 16
CLIMB 3 TO 5 STEPS WITH RAILING: A LOT
TOILETING: A LITTLE
WALKING IN HOSPITAL ROOM: A LOT
HELP NEEDED FOR BATHING: A LOT
DAILY ACTIVITIY SCORE: 19
WALKING IN HOSPITAL ROOM: A LOT
PERSONAL GROOMING: A LITTLE
DRESSING REGULAR LOWER BODY CLOTHING: A LOT
TOILETING: A LOT
MOVING FROM LYING ON BACK TO SITTING ON SIDE OF FLAT BED WITH BEDRAILS: A LITTLE
MOBILITY SCORE: 14
STANDING UP FROM CHAIR USING ARMS: A LOT
DRESSING REGULAR LOWER BODY CLOTHING: A LOT
DRESSING REGULAR UPPER BODY CLOTHING: A LITTLE
HELP NEEDED FOR BATHING: A LITTLE
TURNING FROM BACK TO SIDE WHILE IN FLAT BAD: A LITTLE
MOVING TO AND FROM BED TO CHAIR: A LITTLE
STANDING UP FROM CHAIR USING ARMS: A LITTLE
MOVING FROM LYING ON BACK TO SITTING ON SIDE OF FLAT BED WITH BEDRAILS: A LITTLE
MOBILITY SCORE: 16
DRESSING REGULAR UPPER BODY CLOTHING: A LITTLE

## 2024-01-28 ASSESSMENT — PAIN - FUNCTIONAL ASSESSMENT
PAIN_FUNCTIONAL_ASSESSMENT: 0-10
PAIN_FUNCTIONAL_ASSESSMENT: 0-10

## 2024-01-28 ASSESSMENT — ENCOUNTER SYMPTOMS
VOMITING: 0
FEVER: 0
CHILLS: 0
NAUSEA: 0
HEADACHES: 0
CONFUSION: 1
SHORTNESS OF BREATH: 0
CHEST TIGHTNESS: 0
PALPITATIONS: 0
ABDOMINAL DISTENTION: 0
BLOOD IN STOOL: 1
NECK PAIN: 0
SINUS PAIN: 0
CONSTIPATION: 0
DIARRHEA: 0
JOINT SWELLING: 0
SINUS PRESSURE: 0
DIZZINESS: 0
LIGHT-HEADEDNESS: 0
MYALGIAS: 0
ANAL BLEEDING: 1
CHOKING: 0

## 2024-01-28 ASSESSMENT — PAIN SCALES - GENERAL
PAINLEVEL_OUTOF10: 0 - NO PAIN
PAINLEVEL_OUTOF10: 0 - NO PAIN

## 2024-01-28 NOTE — SIGNIFICANT EVENT
BP a little low at 94/46.  Variable, occasionally much higher.  Will hold lisinpril and place parameters on Coreg.  Consider further adjustment.

## 2024-01-28 NOTE — PROGRESS NOTES
Andree Johnson is a 88 y.o. female on day 0 of admission presenting with Ambulatory dysfunction.    Review of Systems   Constitutional:  Negative for chills and fever.   HENT:  Negative for congestion, sinus pressure and sinus pain.    Respiratory:  Negative for choking, chest tightness and shortness of breath.    Cardiovascular:  Negative for chest pain, palpitations and leg swelling.   Gastrointestinal:  Positive for anal bleeding and blood in stool. Negative for abdominal distention, constipation, diarrhea, nausea and vomiting.        Mild bleeding with BM's, thinks hemorrhoids.   Musculoskeletal:  Positive for gait problem. Negative for joint swelling, myalgias and neck pain.   Neurological:  Negative for dizziness, light-headedness and headaches.   Psychiatric/Behavioral:  Positive for confusion.    All other systems reviewed and are negative.     Subjective   Andree Johnson is a 88 y.o. female presenting with recurrent fall.     88-year-old female admitted 1/27/2024 secondary to recurrent falls at home.  PMH is significant for chronic systolic heart failure, DM2, HTN, HPL, mild aortic AAS, B12 deficiency, vitamin D deficiency.  Patient apparently fell at home around 1901/26.  Patient states she was returning to her bed and was going to lie down.  Uses a rollator to ambulate and states the rollator scooted away from her.  Was brought to the ED for evaluation.  Patient states she does not typically fall but history provided notes that she has had multiple episodes of falling.  Lives by herself although son lives very close.  ED nursing reports they were barely able to get her up and out of bed.  Admitting to evaluate for recurrent fall.  Will have PT assess patient.  CT scan of head and neck was negative for acute injury.  Laboratory results note mild anemia.  Hemoglobin is 10.4.  Patient reports some bright red blood when she has a BM.  It is scant amounts and she does have a history of hemorrhoids.   Currently on DAPT by records.  Will need to verify this is the case.  Hold Plavix for now but continue aspirin.  States her glucoses have been controlled.  Will await input from PT and OT.  May need to consider skilled nursing on discharge.    1/28: Pt seen. Awaiting PT and OT eval. Lab work looks OK. B12 is pretty low. Will start oral meds. Family at bedside. Discussed planning, need recommendations from PT. Still has some spotting with BM's. May need to consider SNF on discharge.       Objective     Last Recorded Vitals  /77 (BP Location: Right arm, Patient Position: Sitting)   Pulse 75   Temp 36.9 °C (98.5 °F) (Temporal)   Resp 18   Wt 113 kg (249 lb)   SpO2 94%   Intake/Output last 3 Shifts:  No intake or output data in the 24 hours ending 01/28/24 1206    Admission Weight  Weight: 115 kg (253 lb 8.5 oz) (01/26/24 2032)    Daily Weight  01/27/24 : 113 kg (249 lb)      Physical Exam  Constitutional:       Appearance: She is obese.   HENT:      Mouth/Throat:      Mouth: Mucous membranes are moist.   Eyes:      Pupils: Pupils are equal, round, and reactive to light.   Cardiovascular:      Rate and Rhythm: Normal rate and regular rhythm.      Heart sounds: No murmur heard.     No friction rub. No gallop.   Pulmonary:      Effort: Pulmonary effort is normal. No respiratory distress.      Breath sounds: No stridor. No wheezing, rhonchi or rales.   Abdominal:      General: There is no distension.      Palpations: Abdomen is soft. There is no mass.      Tenderness: There is no abdominal tenderness. There is no guarding.      Hernia: No hernia is present.   Musculoskeletal:         General: No deformity. Normal range of motion.      Cervical back: Normal range of motion. No rigidity.      Right lower leg: No edema.      Left lower leg: No edema.   Lymphadenopathy:      Cervical: No cervical adenopathy.   Skin:     General: Skin is warm and dry.   Neurological:      General: No focal deficit present.       Mental Status: She is alert and oriented to person, place, and time.          Lab Results  Results for orders placed or performed during the hospital encounter of 01/26/24 (from the past 24 hour(s))   POCT GLUCOSE   Result Value Ref Range    POCT Glucose 180 (H) 74 - 99 mg/dL   POCT GLUCOSE   Result Value Ref Range    POCT Glucose 203 (H) 74 - 99 mg/dL   POCT GLUCOSE   Result Value Ref Range    POCT Glucose 233 (H) 74 - 99 mg/dL   POCT GLUCOSE   Result Value Ref Range    POCT Glucose 135 (H) 74 - 99 mg/dL   POCT GLUCOSE   Result Value Ref Range    POCT Glucose 158 (H) 74 - 99 mg/dL        Image Results  CT cervical spine wo IV contrast, CT head wo IV contrast  Narrative: Interpreted By:  Valerio Arambula,   STUDY:  CT HEAD WO IV CONTRAST; CT CERVICAL SPINE WO IV CONTRAST;  1/26/2024  10:16 pm      INDICATION:  Signs/Symptoms:fall      COMPARISON:  None.      ACCESSION NUMBER(S):  [id number]; [id number]      ORDERING CLINICIAN:  LINDEN RUTH      TECHNIQUE:  Axial noncontrast CT images of head with coronal and sagittal  reconstructed images. Axial noncontrast CT images of the cervical  spine with coronal and sagittal reconstructed images.      FINDINGS:  CT HEAD:      BRAIN PARENCHYMA:  No evidence of acute intraparenchymal hemorrhage  or parenchymal evidence of acute large territory ischemic infarct. No  mass-effect, midline shift or effacement of cerebral sulci.  Gray-white matter distinction is preserved. Global volume loss and  senescent changes again seen.          VENTRICLES and EXTRA-AXIAL SPACES:  No acute extra-axial or  intraventricular hemorrhage. Ventricles and sulci are age-concordant.      PARANASAL SINUSES/MASTOIDS:  No hemorrhage or air-fluid levels within  the visualized paranasal sinuses. The mastoid air cells are  well-aerated.      CALVARIUM/ORBITS:  Demineralization. Vascular calcification.              CT CERVICAL SPINE: Demineralization      PREVERTEBRAL SOFT TISSUES: Within normal limits.       CRANIOCERVICAL JUNCTION: Intact.      ALIGNMENT:  No traumatic malalignment or traumatic facet widening.      VERTEBRAE:  No acute fracture. Vertebral body heights are maintained.      Mild degenerative changes seen without evidence of acute cervical  spine fracture.      Vascular calcifications are seen      Impression: CT HEAD:  Senescent changes. No findings of acute intracranial abnormality          CT CERVICAL SPINE:  No findings of acute cervical spine fracture. Demineralization and  mild degenerative changes.      Signed by: Valerio Arambula 1/26/2024 11:06 PM  Dictation workstation:   [id number]       Assessment/Plan     * Ambulatory dysfunction  Assessment & Plan  PT and OT assessment.  TSH OK.  May need to consider SNF on discharge.  CT of head and neck without injury.  Will check orthostatic vital signs.  Awaiting PT and OT assessments.  Discussed with family at bedside.  B12 is low, will replace.    Melena  Assessment & Plan  Patient reports some bright red blood when she has a bowel movement.  Appears to be scant amounts.  History of hemorrhoids.  Currently on aspirin and Plavix by records.  Will need to confirm.  Continue low-dose aspirin for now.  Hemoglobins have been higher in the past.  Will continue to monitor for now.  Hold on GI evaluation at this time.    Aortic valve stenosis  Assessment & Plan  Mild aortic stenosis on echo 8/2023.    Hypertension, benign  Assessment & Plan  Running a little high.  Check for orthostatic hypotension.    Diabetes mellitus, type 2 (CMS/HCC)  Assessment & Plan  On glipizide and pioglitazone as outpatient.  Holding OHG for now.  Covering with SSI.    B12 deficiency anemia  Assessment & Plan  Still only 140.  B12 supplement ordered.    Hyperlipidemia  Assessment & Plan  Continue home meds.    Cardiomyopathy (CMS/HCC)  Assessment & Plan  EF 50% with global hypokineses on echo 8/2023    CAD in native artery  Assessment & Plan  Continue treatment.                      Valerio Head MD

## 2024-01-28 NOTE — PROGRESS NOTES
01/28/24 1407   Discharge Planning   Living Arrangements Alone   Support Systems Children   Assistance Needed assist from son/daughter   Type of Residence Private residence   Home or Post Acute Services None   Patient expects to be discharged to: Home vs SNF   Does the patient need discharge transport arranged? Yes   RoundTrip coordination needed? Yes     Met with patient at bedside, introduced self and role as RN TCC. Patient lives at home alone. She is independent in her care somewhat, able to ambulate with rollator, son and daughter assist with groceries, food prep, medications, showering, transportation, etc. She has been falling at home. PT OT are on to evaluate. Discussed possible SNF vs HHC and patient agreeable to either. Will follow up once evals are completed. Patient is straight Medicare. She will need to flip to inpatient and have a 3 midnight stay prior to discharge to SNF if that is plan.

## 2024-01-28 NOTE — CARE PLAN
Problem: Pain - Adult  Goal: Verbalizes/displays adequate comfort level or baseline comfort level  Outcome: Progressing     Problem: Safety - Adult  Goal: Free from fall injury  Outcome: Progressing     Problem: Discharge Planning  Goal: Discharge to home or other facility with appropriate resources  Outcome: Progressing     Problem: Chronic Conditions and Co-morbidities  Goal: Patient's chronic conditions and co-morbidity symptoms are monitored and maintained or improved  Outcome: Progressing     Problem: Fall/Injury  Goal: Not fall by end of shift  Outcome: Progressing  Goal: Be free from injury by end of the shift  Outcome: Progressing  Goal: Verbalize understanding of personal risk factors for fall in the hospital  Outcome: Progressing  Goal: Verbalize understanding of risk factor reduction measures to prevent injury from fall in the home  Outcome: Progressing  Goal: Use assistive devices by end of the shift  Outcome: Progressing  Goal: Pace activities to prevent fatigue by end of the shift  Outcome: Progressing     Problem: Diabetes  Goal: Achieve decreasing blood glucose levels by end of shift  Outcome: Progressing  Goal: Increase stability of blood glucose readings by end of shift  Outcome: Progressing  Goal: Maintain electrolyte levels within acceptable range throughout shift  Outcome: Progressing  Goal: Maintain glucose levels >70mg/dl to <250mg/dl throughout shift  Outcome: Progressing  Goal: No changes in neurological exam by end of shift  Outcome: Progressing  Goal: Learn about and adhere to nutrition recommendations by end of shift  Outcome: Progressing  Goal: Vital signs within normal range for age by end of shift  Outcome: Progressing  Goal: Increase self care and/or family involovement by end of shift  Outcome: Progressing  Goal: Receive DSME education by end of shift  Outcome: Progressing     Problem: Skin  Goal: Participates in plan/prevention/treatment measures  Outcome: Progressing  Goal:  Prevent/manage excess moisture  Outcome: Progressing  Goal: Prevent/minimize sheer/friction injuries  Outcome: Progressing  Goal: Promote/optimize nutrition  Outcome: Progressing       The clinical goals for the shift include patient will remain free from falls or injury throughout this shift.

## 2024-01-28 NOTE — CARE PLAN
The patient's goals for the shift include      The clinical goals for the shift include No falls or injuries noted throughout this shift.    Over the shift, the patient did not make progress toward the following goals. Barriers to progression include . Recommendations to address these barriers include .

## 2024-01-29 LAB
GLUCOSE BLD MANUAL STRIP-MCNC: 146 MG/DL (ref 74–99)
GLUCOSE BLD MANUAL STRIP-MCNC: 207 MG/DL (ref 74–99)
GLUCOSE BLD MANUAL STRIP-MCNC: 230 MG/DL (ref 74–99)
GLUCOSE BLD MANUAL STRIP-MCNC: 233 MG/DL (ref 74–99)

## 2024-01-29 PROCEDURE — 2500000001 HC RX 250 WO HCPCS SELF ADMINISTERED DRUGS (ALT 637 FOR MEDICARE OP): Performed by: INTERNAL MEDICINE

## 2024-01-29 PROCEDURE — 82947 ASSAY GLUCOSE BLOOD QUANT: CPT

## 2024-01-29 PROCEDURE — 97530 THERAPEUTIC ACTIVITIES: CPT | Mod: GO

## 2024-01-29 PROCEDURE — 97166 OT EVAL MOD COMPLEX 45 MIN: CPT | Mod: GO

## 2024-01-29 PROCEDURE — G0378 HOSPITAL OBSERVATION PER HR: HCPCS

## 2024-01-29 PROCEDURE — 97162 PT EVAL MOD COMPLEX 30 MIN: CPT | Mod: GP

## 2024-01-29 PROCEDURE — 99232 SBSQ HOSP IP/OBS MODERATE 35: CPT | Performed by: INTERNAL MEDICINE

## 2024-01-29 RX ADMIN — ASPIRIN 81 MG: 81 TABLET, COATED ORAL at 08:38

## 2024-01-29 RX ADMIN — INSULIN LISPRO 2 UNITS: 100 INJECTION, SOLUTION INTRAVENOUS; SUBCUTANEOUS at 20:39

## 2024-01-29 RX ADMIN — CARVEDILOL 12.5 MG: 12.5 TABLET, FILM COATED ORAL at 08:38

## 2024-01-29 RX ADMIN — CYANOCOBALAMIN TAB 1000 MCG 1000 MCG: 1000 TAB at 08:38

## 2024-01-29 RX ADMIN — INSULIN LISPRO 2 UNITS: 100 INJECTION, SOLUTION INTRAVENOUS; SUBCUTANEOUS at 11:32

## 2024-01-29 RX ADMIN — ATORVASTATIN CALCIUM 80 MG: 40 TABLET, FILM COATED ORAL at 20:21

## 2024-01-29 RX ADMIN — INSULIN LISPRO 2 UNITS: 100 INJECTION, SOLUTION INTRAVENOUS; SUBCUTANEOUS at 16:45

## 2024-01-29 ASSESSMENT — PAIN - FUNCTIONAL ASSESSMENT
PAIN_FUNCTIONAL_ASSESSMENT: 0-10

## 2024-01-29 ASSESSMENT — PAIN SCALES - GENERAL
PAINLEVEL_OUTOF10: 0 - NO PAIN

## 2024-01-29 ASSESSMENT — COGNITIVE AND FUNCTIONAL STATUS - GENERAL
EATING MEALS: A LITTLE
DAILY ACTIVITIY SCORE: 16
WALKING IN HOSPITAL ROOM: TOTAL
MOVING FROM LYING ON BACK TO SITTING ON SIDE OF FLAT BED WITH BEDRAILS: TOTAL
CLIMB 3 TO 5 STEPS WITH RAILING: TOTAL
STANDING UP FROM CHAIR USING ARMS: TOTAL
TOILETING: TOTAL
PERSONAL GROOMING: A LOT
MOVING FROM LYING ON BACK TO SITTING ON SIDE OF FLAT BED WITH BEDRAILS: TOTAL
PERSONAL GROOMING: A LOT
DRESSING REGULAR LOWER BODY CLOTHING: A LOT
DRESSING REGULAR UPPER BODY CLOTHING: A LOT
MOVING FROM LYING ON BACK TO SITTING ON SIDE OF FLAT BED WITH BEDRAILS: A LITTLE
STANDING UP FROM CHAIR USING ARMS: A LOT
MOVING TO AND FROM BED TO CHAIR: A LITTLE
DRESSING REGULAR UPPER BODY CLOTHING: A LITTLE
MOVING TO AND FROM BED TO CHAIR: TOTAL
DAILY ACTIVITIY SCORE: 12
PERSONAL GROOMING: A LOT
DRESSING REGULAR LOWER BODY CLOTHING: A LOT
TOILETING: A LOT
MOBILITY SCORE: 6
EATING MEALS: A LITTLE
DAILY ACTIVITIY SCORE: 13
HELP NEEDED FOR BATHING: A LOT
MOVING TO AND FROM BED TO CHAIR: TOTAL
TURNING FROM BACK TO SIDE WHILE IN FLAT BAD: TOTAL
PERSONAL GROOMING: A LITTLE
WALKING IN HOSPITAL ROOM: TOTAL
DAILY ACTIVITIY SCORE: 13
CLIMB 3 TO 5 STEPS WITH RAILING: TOTAL
CLIMB 3 TO 5 STEPS WITH RAILING: TOTAL
DRESSING REGULAR UPPER BODY CLOTHING: A LOT
WALKING IN HOSPITAL ROOM: A LOT
MOBILITY SCORE: 14
DRESSING REGULAR LOWER BODY CLOTHING: A LOT
STANDING UP FROM CHAIR USING ARMS: TOTAL
DRESSING REGULAR LOWER BODY CLOTHING: A LOT
HELP NEEDED FOR BATHING: A LOT
HELP NEEDED FOR BATHING: A LOT
DRESSING REGULAR UPPER BODY CLOTHING: A LOT
TURNING FROM BACK TO SIDE WHILE IN FLAT BAD: A LITTLE
MOBILITY SCORE: 6
TOILETING: A LOT
HELP NEEDED FOR BATHING: A LOT
TURNING FROM BACK TO SIDE WHILE IN FLAT BAD: TOTAL
TOILETING: A LOT
EATING MEALS: A LITTLE

## 2024-01-29 ASSESSMENT — ENCOUNTER SYMPTOMS
LIGHT-HEADEDNESS: 0
CHEST TIGHTNESS: 0
DIARRHEA: 0
SINUS PAIN: 0
VOMITING: 0
ABDOMINAL DISTENTION: 0
JOINT SWELLING: 0
CONSTIPATION: 0
SINUS PRESSURE: 0
PALPITATIONS: 0
DIZZINESS: 0
SHORTNESS OF BREATH: 0
PSYCHIATRIC NEGATIVE: 1
WEAKNESS: 1
HEADACHES: 0
MYALGIAS: 0
NECK PAIN: 0
CHILLS: 0
NAUSEA: 0
FEVER: 0
CHOKING: 0
HEMATOLOGIC/LYMPHATIC NEGATIVE: 1

## 2024-01-29 ASSESSMENT — ACTIVITIES OF DAILY LIVING (ADL): BATHING_ASSISTANCE: MODERATE

## 2024-01-29 NOTE — CARE PLAN
The patient's goals for the shift include      The clinical goals for the shift include pt will remain free from all or injury    Over the shift, the patient did not make progress toward the following goals. Barriers to progression include . Recommendations to address these barriers include .            Problem: Pain - Adult  Goal: Verbalizes/displays adequate comfort level or baseline comfort level  Outcome: Progressing     Problem: Safety - Adult  Goal: Free from fall injury  Outcome: Progressing     Problem: Discharge Planning  Goal: Discharge to home or other facility with appropriate resources  Outcome: Progressing     Problem: Chronic Conditions and Co-morbidities  Goal: Patient's chronic conditions and co-morbidity symptoms are monitored and maintained or improved  Outcome: Progressing     Problem: Fall/Injury  Goal: Not fall by end of shift  Outcome: Progressing  Goal: Be free from injury by end of the shift  Outcome: Progressing  Goal: Verbalize understanding of personal risk factors for fall in the hospital  Outcome: Progressing  Goal: Verbalize understanding of risk factor reduction measures to prevent injury from fall in the home  Outcome: Progressing  Goal: Use assistive devices by end of the shift  Outcome: Progressing  Goal: Pace activities to prevent fatigue by end of the shift  Outcome: Progressing     Problem: Diabetes  Goal: Achieve decreasing blood glucose levels by end of shift  Outcome: Progressing  Goal: Increase stability of blood glucose readings by end of shift  Outcome: Progressing  Goal: Maintain electrolyte levels within acceptable range throughout shift  Outcome: Progressing  Goal: Maintain glucose levels >70mg/dl to <250mg/dl throughout shift  Outcome: Progressing  Goal: No changes in neurological exam by end of shift  Outcome: Progressing  Goal: Learn about and adhere to nutrition recommendations by end of shift  Outcome: Progressing  Goal: Vital signs within normal range for age by  end of shift  Outcome: Progressing  Goal: Increase self care and/or family involovement by end of shift  Outcome: Progressing  Goal: Receive DSME education by end of shift  Outcome: Progressing     Problem: Skin  Goal: Participates in plan/prevention/treatment measures  Outcome: Progressing  Goal: Prevent/manage excess moisture  Outcome: Progressing  Goal: Prevent/minimize sheer/friction injuries  Outcome: Progressing  Flowsheets (Taken 1/29/2024 3933)  Prevent/minimize sheer/friction injuries:   HOB 30 degrees or less   Increase activity/out of bed for meals   Turn/reposition every 2 hours/use positioning/transfer devices   Use pull sheet  Goal: Promote/optimize nutrition  Outcome: Progressing

## 2024-01-29 NOTE — PROGRESS NOTES
Physical Therapy    Physical Therapy Evaluation    Patient Name: Andree Johnson  MRN: [id number]  Today's Date: 1/29/2024   Time Calculation  Start Time: 1013  Stop Time: 1041  Time Calculation (min): 28 min    Assessment/Plan   PT Assessment  PT Assessment Results: Decreased strength, Decreased endurance, Impaired balance, Decreased mobility, Impaired judgement, Decreased cognition, Impaired hearing, Impaired vision, Decreased safety awareness, Pain  Rehab Prognosis: Fair  Evaluation/Treatment Tolerance: Patient limited by fatigue, Patient limited by pain  End of Session Communication: Bedside nurse  Assessment Comment:  (MAX X2 TO TRY AND STAND EOB W/ FWW, KNEES COLLAPSED, HAD TO SIT RIGHT DOWN, MOD/MAX X2 BED MOBILITY, HIGH FALL RISK NEEDS MOD SKILLED REHAB ON DISCH, AL IN  TO [facility])  End of Session Patient Position: Bed, 3 rail up, Alarm on (CALL LIGHT IN REACH)  IP OR SWING BED PT PLAN  Inpatient or Swing Bed: Inpatient  PT Plan  Treatment/Interventions: Bed mobility, Transfer training, Strengthening, Endurance training  PT Plan: Skilled PT  PT Frequency: 3 times per week  PT Discharge Recommendations: Moderate intensity level of continued care  Equipment Recommended upon Discharge:  (TBD)  PT Recommended Transfer Status: Assist x2 (BED LEVEL)  PT - OK to Discharge: Yes (WHEN MEDICALLY CLEARED TO NEXT LOC)      Subjective   General Visit Information:  General  Reason for Referral: IMAIRED MOBILTYI, GAIT TRAINING, I MPEIARED COGNITION/SAFETY AWARENESS  Referred By: MECHE  Past Medical History Relevant to Rehab: FALL, RECENT BLACK TARRY STOOLS; DX: MELENA; HX: GLAUCOMA, BREAST CA, HF, DM, HTN, HPL, AORTIC AAS, CAD, ANEMIA  Co-Treatment: OT  Co-Treatment Reason: FACILITATE MOBILITY SAFETY  Prior to Session Communication: Bedside nurse  Patient Position Received: Bed, 3 rail up, Alarm on (ROOM 2326 ALERT IV)  Home Living:  Home Living  Home Living Comments:  (ALONE, SON LIVES NEARBY, 1 LEVEL HOME, 0STE, ROLLATOR  AMB, WALK IN SHOWER/SEAT/BARS, PT. DOESLAUNDRY, FAMIY BRINGS MWALS, SHOPS, CLEANS& TAKES TO APPTS)       Precautions:  Precautions  Hearing/Visual Limitations: [place], GLAUCOMA  Medical Precautions: Fall precautions         Objective   Pain:  Pain Assessment  Pain Score:  (C/O 6/10 LOW BACK PAIN WHEN SEATED EOB, STAED THIS ID CHRONIC  WITH AVERAGE PAIN LEVEL 6)  Cognition:  Cognition  Overall Cognitive Status: Impaired  Orientation Level: Disoriented to situation  Following Commands: Follows one step commands with increased time  Attention: Exceptions to WFL  Sustained Attention: Impaired  Safety/Judgement: Exceptions to WFL  Complex Functional Tasks: Moderate  Novel Situations: Moderate  Routine Tasks: Moderate  Insight: Moderate  Task Initiation: Initiates with cues  Processing Speed: Delayed    General Assessments:  General Observation  General Observation:  (HESITANT TO MOBILIZE, LIGHTHEADED W/ ALL MOBILITY, NO CHANGES EVEN WHEN SITTING FOR A WHILE PER PT. REPORT)               Activity Tolerance  Endurance: Tolerates 10 - 20 min exercise with multiple rests    Sensation  Sensation Comment: NO C/O    Strength  Strength Comments: ROM LEGS WFL, STRENGTH 3-/5 IN HIPS AND KNEES 3/5 ANKLES  Strength  Strength Comments: ROM LEGS WFL, STRENGTH 3-/5 IN HIPS AND KNEES 3/5 ANKLES                     Static Sitting Balance  Static Sitting-Comment/Number of Minutes: FAIR  Dynamic Sitting Balance  Dynamic Sitting-Comments: FAIR-    Static Standing Balance  Static Standing-Comment/Number of Minutes: POOR  Dynamic Standing Balance  Dynamic Standing-Comments: POOR-  Functional Assessments:  Bed Mobility  Bed Mobility:  (SUPNE>SIT MOD X2 TAP PAD, , SIT>SUPNE MAX X2, UP IN BED MOD X2 TAP PAD, SAT EOB TOTAL 8 MIN CGA)    Transfers  Transfer:  (SIT<>STAND MAX X2 FWW, KNEES BUCKLED SO HAD TO SIT RIGHT DOWN)  Extremity/Trunk Assessments:  Cervical Spine   Cervical Spine:  (MILD FORWARD HEAD)                   Outcome Measures:  [facility]  Basic Mobility  Turning from your back to your side while in a flat bed without using bedrails: Total  Moving from lying on your back to sitting on the side of a flat bed without using bedrails: Total  Moving to and from bed to chair (including a wheelchair): Total  Standing up from a chair using your arms (e.g. wheelchair or bedside chair): Total  To walk in hospital room: Total  Climbing 3-5 steps with railing: Total  Basic Mobility - Total Score: 6    Encounter Problems       Encounter Problems (Active)       Balance       Goal 1 (Not Progressing)       Start:  01/29/24    Expected End:  02/05/24       SIT EOB FOR 15 MIN W/ SBA WHILE COMPLETING EX AND FUNCTIONAL ACTIVITIES TO IMPROVE STRENGTH, BALANCE, ENDURANCE FOR PROGRESSION OF ACTIVITIES            Mobility       STRENGTHENING (Not Progressing)       Start:  01/29/24    Expected End:  02/12/24       20 REPS RROM EX INCREASING STRENGTH TO STABILIZE OOB ACTIVITIES            Pain - Adult          Transfers       STG - Patient to transfer to and from sit to supine (Not Progressing)       Start:  01/29/24    Expected End:  02/07/24       HOB FLAT, NO RAIL MIN A X1         STG - Patient will transfer sit to and from stand (Not Progressing)       Start:  01/29/24    Expected End:  02/12/24       FWW MIN X1-2 USING PROPER TECHNIQUE                Education Documentation  Mobility Training, taught by Kourtney Gary PT at 1/29/2024  1:28 PM.  Learner: Patient  Readiness: Acceptance  Method: Explanation  Response: Needs Reinforcement  Comment: ROLE OF THERAPY IN RECOVERY, MPORTANCE OF MOBILITY IN RECOVERY    Education Comments  No comments found.

## 2024-01-29 NOTE — CARE PLAN
Problem: Balance  Goal: Goal 1  Description: SIT EOB FOR 15 MIN W/ SBA WHILE COMPLETING EX AND FUNCTIONAL ACTIVITIES TO IMPROVE STRENGTH, BALANCE, ENDURANCE FOR PROGRESSION OF ACTIVITIES  Outcome: Not Progressing     Problem: Mobility  Goal: STRENGTHENING  Description: 20 REPS RROM EX INCREASING STRENGTH TO STABILIZE OOB ACTIVITIES  Outcome: Not Progressing     Problem: Transfers  Goal: STG - Patient to transfer to and from sit to supine  Description: HOB FLAT, NO RAIL MIN A X1  Outcome: Not Progressing  Goal: STG - Patient will transfer sit to and from stand  Description: FWW MIN X1-2 USING PROPER TECHNIQUE  Outcome: Not Progressing

## 2024-01-30 LAB
ANION GAP SERPL CALC-SCNC: 11 MMOL/L (ref 10–20)
BASOPHILS # BLD AUTO: 0.03 X10*3/UL (ref 0–0.1)
BASOPHILS NFR BLD AUTO: 0.4 %
BUN SERPL-MCNC: 25 MG/DL (ref 6–23)
CALCIUM SERPL-MCNC: 8.3 MG/DL (ref 8.6–10.3)
CHLORIDE SERPL-SCNC: 102 MMOL/L (ref 98–107)
CO2 SERPL-SCNC: 27 MMOL/L (ref 21–32)
CREAT SERPL-MCNC: 0.74 MG/DL (ref 0.5–1.05)
EGFRCR SERPLBLD CKD-EPI 2021: 78 ML/MIN/1.73M*2
EOSINOPHIL # BLD AUTO: 0.33 X10*3/UL (ref 0–0.4)
EOSINOPHIL NFR BLD AUTO: 4.9 %
ERYTHROCYTE [DISTWIDTH] IN BLOOD BY AUTOMATED COUNT: 14.3 % (ref 11.5–14.5)
GLUCOSE BLD MANUAL STRIP-MCNC: 158 MG/DL (ref 74–99)
GLUCOSE BLD MANUAL STRIP-MCNC: 190 MG/DL (ref 74–99)
GLUCOSE BLD MANUAL STRIP-MCNC: 222 MG/DL (ref 74–99)
GLUCOSE BLD MANUAL STRIP-MCNC: 271 MG/DL (ref 74–99)
GLUCOSE SERPL-MCNC: 170 MG/DL (ref 74–99)
HCT VFR BLD AUTO: 31.7 % (ref 36–46)
HGB BLD-MCNC: 10.1 G/DL (ref 12–16)
IMM GRANULOCYTES # BLD AUTO: 0.03 X10*3/UL (ref 0–0.5)
IMM GRANULOCYTES NFR BLD AUTO: 0.4 % (ref 0–0.9)
LYMPHOCYTES # BLD AUTO: 1.28 X10*3/UL (ref 0.8–3)
LYMPHOCYTES NFR BLD AUTO: 19 %
MCH RBC QN AUTO: 31.5 PG (ref 26–34)
MCHC RBC AUTO-ENTMCNC: 31.9 G/DL (ref 32–36)
MCV RBC AUTO: 99 FL (ref 80–100)
MONOCYTES # BLD AUTO: 0.8 X10*3/UL (ref 0.05–0.8)
MONOCYTES NFR BLD AUTO: 11.9 %
NEUTROPHILS # BLD AUTO: 4.27 X10*3/UL (ref 1.6–5.5)
NEUTROPHILS NFR BLD AUTO: 63.4 %
NRBC BLD-RTO: 0 /100 WBCS (ref 0–0)
PLATELET # BLD AUTO: 143 X10*3/UL (ref 150–450)
POTASSIUM SERPL-SCNC: 4.3 MMOL/L (ref 3.5–5.3)
RBC # BLD AUTO: 3.21 X10*6/UL (ref 4–5.2)
SODIUM SERPL-SCNC: 136 MMOL/L (ref 136–145)
WBC # BLD AUTO: 6.7 X10*3/UL (ref 4.4–11.3)

## 2024-01-30 PROCEDURE — G0378 HOSPITAL OBSERVATION PER HR: HCPCS

## 2024-01-30 PROCEDURE — 85025 COMPLETE CBC W/AUTO DIFF WBC: CPT | Performed by: INTERNAL MEDICINE

## 2024-01-30 PROCEDURE — 2500000001 HC RX 250 WO HCPCS SELF ADMINISTERED DRUGS (ALT 637 FOR MEDICARE OP): Performed by: INTERNAL MEDICINE

## 2024-01-30 PROCEDURE — 82947 ASSAY GLUCOSE BLOOD QUANT: CPT

## 2024-01-30 PROCEDURE — 80048 BASIC METABOLIC PNL TOTAL CA: CPT | Performed by: INTERNAL MEDICINE

## 2024-01-30 PROCEDURE — 36415 COLL VENOUS BLD VENIPUNCTURE: CPT | Performed by: INTERNAL MEDICINE

## 2024-01-30 PROCEDURE — 2500000004 HC RX 250 GENERAL PHARMACY W/ HCPCS (ALT 636 FOR OP/ED): Performed by: INTERNAL MEDICINE

## 2024-01-30 PROCEDURE — 99232 SBSQ HOSP IP/OBS MODERATE 35: CPT | Performed by: INTERNAL MEDICINE

## 2024-01-30 RX ADMIN — CYANOCOBALAMIN TAB 1000 MCG 1000 MCG: 1000 TAB at 08:30

## 2024-01-30 RX ADMIN — ATORVASTATIN CALCIUM 80 MG: 40 TABLET, FILM COATED ORAL at 20:52

## 2024-01-30 RX ADMIN — ONDANSETRON 4 MG: 2 INJECTION INTRAMUSCULAR; INTRAVENOUS at 10:15

## 2024-01-30 RX ADMIN — CARVEDILOL 12.5 MG: 12.5 TABLET, FILM COATED ORAL at 08:30

## 2024-01-30 RX ADMIN — ASPIRIN 81 MG: 81 TABLET, COATED ORAL at 08:29

## 2024-01-30 RX ADMIN — INSULIN LISPRO 2 UNITS: 100 INJECTION, SOLUTION INTRAVENOUS; SUBCUTANEOUS at 13:16

## 2024-01-30 RX ADMIN — INSULIN LISPRO 2 UNITS: 100 INJECTION, SOLUTION INTRAVENOUS; SUBCUTANEOUS at 17:19

## 2024-01-30 RX ADMIN — INSULIN LISPRO 3 UNITS: 100 INJECTION, SOLUTION INTRAVENOUS; SUBCUTANEOUS at 20:59

## 2024-01-30 RX ADMIN — CARVEDILOL 12.5 MG: 12.5 TABLET, FILM COATED ORAL at 17:18

## 2024-01-30 ASSESSMENT — COGNITIVE AND FUNCTIONAL STATUS - GENERAL
PERSONAL GROOMING: A LITTLE
DRESSING REGULAR UPPER BODY CLOTHING: A LITTLE
STANDING UP FROM CHAIR USING ARMS: TOTAL
MOBILITY SCORE: 6
MOVING FROM LYING ON BACK TO SITTING ON SIDE OF FLAT BED WITH BEDRAILS: TOTAL
MOBILITY SCORE: 6
WALKING IN HOSPITAL ROOM: TOTAL
DRESSING REGULAR LOWER BODY CLOTHING: A LITTLE
MOVING FROM LYING ON BACK TO SITTING ON SIDE OF FLAT BED WITH BEDRAILS: TOTAL
DAILY ACTIVITIY SCORE: 16
MOVING TO AND FROM BED TO CHAIR: TOTAL
DAILY ACTIVITIY SCORE: 16
WALKING IN HOSPITAL ROOM: TOTAL
DRESSING REGULAR LOWER BODY CLOTHING: A LITTLE
TOILETING: TOTAL
HELP NEEDED FOR BATHING: A LOT
PERSONAL GROOMING: A LITTLE
DRESSING REGULAR UPPER BODY CLOTHING: A LITTLE
MOVING TO AND FROM BED TO CHAIR: TOTAL
TOILETING: TOTAL
TURNING FROM BACK TO SIDE WHILE IN FLAT BAD: TOTAL
CLIMB 3 TO 5 STEPS WITH RAILING: TOTAL
STANDING UP FROM CHAIR USING ARMS: TOTAL
HELP NEEDED FOR BATHING: A LOT
CLIMB 3 TO 5 STEPS WITH RAILING: TOTAL
TURNING FROM BACK TO SIDE WHILE IN FLAT BAD: TOTAL

## 2024-01-30 ASSESSMENT — ENCOUNTER SYMPTOMS
FEVER: 0
DIAPHORESIS: 0
RHINORRHEA: 0
SHORTNESS OF BREATH: 0
WEAKNESS: 1
NAUSEA: 1
SORE THROAT: 0
LIGHT-HEADEDNESS: 0
CHILLS: 0
VOMITING: 1
COUGH: 0
DIZZINESS: 0
MYALGIAS: 0
CONSTIPATION: 0
FATIGUE: 0
DIARRHEA: 0
ABDOMINAL PAIN: 0
JOINT SWELLING: 0

## 2024-01-30 ASSESSMENT — PAIN SCALES - GENERAL
PAINLEVEL_OUTOF10: 0 - NO PAIN

## 2024-01-30 ASSESSMENT — PAIN - FUNCTIONAL ASSESSMENT: PAIN_FUNCTIONAL_ASSESSMENT: 0-10

## 2024-01-30 NOTE — PROGRESS NOTES
"Andree Johnson is a 88 y.o. female on day 0 of admission presenting with Ambulatory dysfunction.      Subjective   Andree Johnson is a 88 y.o. female presenting with recurrent fall.     88-year-old female admitted 1/27/2024 secondary to recurrent falls at home.  PMH is significant for chronic systolic heart failure, DM2, HTN, HPL, mild aortic AAS, B12 deficiency, vitamin D deficiency.  Patient apparently fell at home around 1901/26.  Patient states she was returning to her bed and was going to lie down.  Uses a rollator to ambulate and states the rollator scooted away from her.  Was brought to the ED for evaluation.  Patient states she does not typically fall but history provided notes that she has had multiple episodes of falling.  Lives by herself although son lives very close.  ED nursing reports they were barely able to get her up and out of bed.  Admitting to evaluate for recurrent fall.  Will have PT assess patient.  CT scan of head and neck was negative for acute injury.  Laboratory results note mild anemia.  Hemoglobin is 10.4.  Patient reports some bright red blood when she has a BM.  It is scant amounts and she does have a history of hemorrhoids.  Currently on DAPT by records.  Will need to verify this is the case.  Hold Plavix for now but continue aspirin.  States her glucoses have been controlled.  Will await input from PT and OT.  May need to consider skilled nursing on discharge.     1/28: Pt seen. Awaiting PT and OT eval. Lab work looks OK. B12 is pretty low. Will start oral meds. Family at bedside. Discussed planning, need recommendations from PT. Still has some spotting with BM's. May need to consider SNF on discharge.     1/29: No acute events overnight.  Patient denies any new symptoms other than weakness.  She denies any abdominal pain chest pain, shortness of breath, nausea or vomiting.  Awaiting PT and OT recommendations.     1/30: Patient seen today and states she feels \"sick to her " "stomach\" She has had multiple episodes of nausea and vomiting this morning. She denies any abdominal pain, fever, chills, or diaphoresis.     Review of Systems   Constitutional:  Negative for chills, diaphoresis, fatigue and fever.   HENT:  Negative for rhinorrhea and sore throat.    Respiratory:  Negative for cough and shortness of breath.    Cardiovascular:  Negative for chest pain.   Gastrointestinal:  Positive for nausea and vomiting. Negative for abdominal pain, constipation and diarrhea.   Musculoskeletal:  Negative for joint swelling and myalgias.   Skin:  Negative for pallor and rash.   Neurological:  Positive for weakness. Negative for dizziness and light-headedness.      Objective     Last Recorded Vitals  /67   Pulse 67   Temp 36.3 °C (97.4 °F)   Resp 18   Wt 113 kg (249 lb)   SpO2 95%   Intake/Output last 3 Shifts:    Intake/Output Summary (Last 24 hours) at 1/30/2024 0938  Last data filed at 1/30/2024 0646  Gross per 24 hour   Intake 356 ml   Output 525 ml   Net -169 ml       Admission Weight  Weight: 115 kg (253 lb 8.5 oz) (01/26/24 2032)    Daily Weight  01/27/24 : 113 kg (249 lb)    Image Results  ECG 12 lead  Sinus rhythm  Probable left atrial enlargement  Left bundle branch block      Physical Exam  Constitutional:       General: She is not in acute distress.     Appearance: Normal appearance. She is not ill-appearing.   HENT:      Head: Normocephalic and atraumatic.   Cardiovascular:      Rate and Rhythm: Normal rate and regular rhythm.      Heart sounds: No murmur heard.     No gallop.   Pulmonary:      Effort: Pulmonary effort is normal.      Breath sounds: Normal breath sounds. No wheezing or rhonchi.   Abdominal:      General: There is no distension.      Palpations: Abdomen is soft.      Tenderness: There is no abdominal tenderness.   Musculoskeletal:         General: No swelling or tenderness.   Skin:     General: Skin is warm and dry.   Neurological:      Mental Status: She is " alert and oriented to person, place, and time.         Relevant Results               Assessment/Plan      * Ambulatory dysfunction  Assessment & Plan  PT/OT assessment   TSH OK.  May need to consider SNF on discharge.  CT of head and neck without injury.  Will check orthostatic vital signs.  Awaiting PT and OT assessments.  Discussed with family at bedside.  B12 is low, will replace.  1/29: PT and OT have recommended skilled nursing facility.  Continue PT and OT.  I discussed the case with case management.  1/30: Continuing communication with family and case management to assure safe disposition .    Morbid Obesity with a BMI of 45.59       Melena  Assessment & Plan  Patient reports some bright red blood when she has a bowel movement.  Appears to be scant amounts.  History of hemorrhoids.  Currently on aspirin and Plavix by records.  Will need to confirm.  Continue low-dose aspirin for now.  Hemoglobins have been higher in the past.  Will continue to monitor for now.  Hold on GI evaluation at this time.  1/29: Resolved    Aortic valve stenosis  Assessment & Plan  Mild aortic stenosis on echo 8/2023.     Hypertension, benign  Assessment & Plan  Running a little high.  Check for orthostatic hypotension.  Lisinopril currently unhold     Hyperlipidemia  Assessment & Plan  Continue home meds.     Diabetes mellitus, type 2 (CMS/HCC)  Assessment & Plan  On glipizide and pioglitazone as outpatient.  Holding OHG for now.  Covering with SSI.     Cardiomyopathy (CMS/HCC)  Assessment & Plan  EF 50% with global hypokineses on echo 8/2023     CAD in native artery  Assessment & Plan  Continue treatment.     B12 deficiency anemia  Assessment & Plan  Still only 140.  B12 supplement ordered.      Principal Problem:    Ambulatory dysfunction  Active Problems:    B12 deficiency anemia    CAD in native artery    Cardiomyopathy (CMS/HCC)    Diabetes mellitus, type 2 (CMS/HCC)    Hyperlipidemia    Hypertension, benign    Aortic valve  stenosis    Ariana Jansen    Pt seen and examined  with my PA student . I have modified the note to reflect my documentation of HPI and assessment and plan.    Chris Ochoa MD MRCP

## 2024-01-30 NOTE — PROGRESS NOTES
Phone call received from patient's daughter Maye. She is upset that patient is not admitted. Discussed OBS status, and that insurance criteria has not been met for patient to be admitted; a fall at home alone is not a reason for inpatient status. I explained that at this point Medicare will not cover any post-acute care for patient except for skilled HHC. Discussed possible discharge scenarios. Maye states patient cannot come to her house or her brother's home at discharge due to family works and will not be home throughout the day to assist patient. Maye is unsure of patient's resources and is currently trying to find out how much money patient has in the bank, and would prefer to keep patient's money if she is able to do so. We discussed a referral to [facility], which Maye agreed to. Maye is also open to private duty options, and a list was left for her at patient's bedside. Maye was also accepting of a referral to the assisted living  service. I am asking SW to assist with this.

## 2024-01-30 NOTE — CARE PLAN
Problem: Skin  Goal: Participates in plan/prevention/treatment measures  1/30/2024 1001 by Celine Justin RN  Flowsheets (Taken 1/30/2024 1001)  Participates in plan/prevention/treatment measures: Discuss with provider PT/OT consult  1/30/2024 1000 by Celine Justin RN  Outcome: Progressing  Goal: Prevent/manage excess moisture  1/30/2024 1001 by Celine Justin RN  Flowsheets (Taken 1/30/2024 1001)  Prevent/manage excess moisture: Moisturize dry skin  1/30/2024 1000 by Celine Justin RN  Outcome: Progressing  Goal: Prevent/minimize sheer/friction injuries  1/30/2024 1001 by Celine Justin RN  Flowsheets (Taken 1/30/2024 1001)  Prevent/minimize sheer/friction injuries: HOB 30 degrees or less  1/30/2024 1000 by Celine Justin RN  Outcome: Progressing  Goal: Promote/optimize nutrition  1/30/2024 1001 by Celine Justin RN  Flowsheets (Taken 1/30/2024 1001)  Promote/optimize nutrition: Offer water/supplements/favorite foods  1/30/2024 1000 by Celine Justin RN  Outcome: Progressing  Goal: Decreased wound size/increased tissue granulation at next dressing change  1/30/2024 1001 by Celine Justin RN  Flowsheets (Taken 1/30/2024 1001)  Decreased wound size/increased tissue granulation at next dressing change: Protective dressings over bony prominences  1/30/2024 1000 by Celine Justin RN  Outcome: Progressing  Goal: Promote skin healing  1/30/2024 1001 by Celine Justin RN  Flowsheets (Taken 1/30/2024 1001)  Promote skin healing: Turn/reposition every 2 hours/use positioning/transfer devices  1/30/2024 1000 by Celine Justin RN  Outcome: Progressing   The patient's goals for the shift include      The clinical goals for the shift include Pt will be free from falls this shift

## 2024-01-30 NOTE — CARE PLAN
The patient's goals for the shift include      The clinical goals for the shift include Pt will be free from falls this shift

## 2024-01-30 NOTE — PROGRESS NOTES
Physical Therapy                 Therapy Communication Note    Patient Name: Andree Johnson  MRN: [id number]  Today's Date: 1/30/2024     Discipline: Physical Therapy    Missed Visit Reason: Patient refused    Missed Time: Attempt

## 2024-01-30 NOTE — PROGRESS NOTES
Met with patient, discussed PT/OT recommendations for MOD intensity therapy. Patient is insured through traditional [facility], which requires a 3-midnight inpatient hospital stay in order to qualify for SNF placement. Patient is currently OBS status. We briefly discussed with [facility] yesterday. At that time there was no medical reason to admit. Patient's son and family live nearby to patient. They call and come to the house to check on patient frequently. There is no family contact information listed in the system for this patient. Patient does not know her son's phone number. I gave patient my information to pass along to her son. Patient and I briefly discussed private pay to SNF, however patient says this would not be an affordable option for her. Following.

## 2024-01-30 NOTE — PROGRESS NOTES
Andree Johnson is a 88 y.o. female on day 0 of admission presenting with Ambulatory dysfunction.    Review of Systems   Constitutional:  Negative for chills and fever.   HENT:  Negative for congestion, sinus pressure and sinus pain.    Respiratory:  Negative for choking, chest tightness and shortness of breath.    Cardiovascular:  Negative for chest pain, palpitations and leg swelling.   Gastrointestinal:  Negative for abdominal distention, constipation, diarrhea, nausea and vomiting.        Mild bleeding with BM's, thinks hemorrhoids.   Musculoskeletal:  Negative for joint swelling, myalgias and neck pain.   Neurological:  Positive for weakness. Negative for dizziness, light-headedness and headaches.   Hematological: Negative.    Psychiatric/Behavioral: Negative.     All other systems reviewed and are negative.     Subjective   Andree Johnson is a 88 y.o. female presenting with recurrent fall.     88-year-old female admitted 1/27/2024 secondary to recurrent falls at home.  PMH is significant for chronic systolic heart failure, DM2, HTN, HPL, mild aortic AAS, B12 deficiency, vitamin D deficiency.  Patient apparently fell at home around 1901/26.  Patient states she was returning to her bed and was going to lie down.  Uses a rollator to ambulate and states the rollator scooted away from her.  Was brought to the ED for evaluation.  Patient states she does not typically fall but history provided notes that she has had multiple episodes of falling.  Lives by herself although son lives very close.  ED nursing reports they were barely able to get her up and out of bed.  Admitting to evaluate for recurrent fall.  Will have PT assess patient.  CT scan of head and neck was negative for acute injury.  Laboratory results note mild anemia.  Hemoglobin is 10.4.  Patient reports some bright red blood when she has a BM.  It is scant amounts and she does have a history of hemorrhoids.  Currently on DAPT by records.  Will need to  verify this is the case.  Hold Plavix for now but continue aspirin.  States her glucoses have been controlled.  Will await input from PT and OT.  May need to consider skilled nursing on discharge.    1/28: Pt seen. Awaiting PT and OT eval. Lab work looks OK. B12 is pretty low. Will start oral meds. Family at bedside. Discussed planning, need recommendations from PT. Still has some spotting with BM's. May need to consider SNF on discharge.     1/29: No acute events overnight.  Patient denies any new symptoms other than weakness.  She denies any abdominal pain chest pain, shortness of breath, nausea or vomiting.  Awaiting PT and OT recommendations.    Objective     Last Recorded Vitals  /53 (BP Location: Left arm, Patient Position: Lying)   Pulse 66   Temp 36.6 °C (97.8 °F)   Resp 18   Wt 113 kg (249 lb)   SpO2 97%   Intake/Output last 3 Shifts:    Intake/Output Summary (Last 24 hours) at 1/29/2024 2036  Last data filed at 1/29/2024 1512  Gross per 24 hour   Intake 236 ml   Output 250 ml   Net -14 ml       Admission Weight  Weight: 115 kg (253 lb 8.5 oz) (01/26/24 2032)    Daily Weight  01/27/24 : 113 kg (249 lb)      Physical Exam  Constitutional:       Appearance: She is obese.   HENT:      Mouth/Throat:      Mouth: Mucous membranes are moist.   Eyes:      Pupils: Pupils are equal, round, and reactive to light.   Cardiovascular:      Rate and Rhythm: Normal rate and regular rhythm.      Heart sounds: No murmur heard.     No friction rub. No gallop.   Pulmonary:      Effort: Pulmonary effort is normal. No respiratory distress.      Breath sounds: No stridor. No wheezing, rhonchi or rales.   Abdominal:      General: There is no distension.      Palpations: Abdomen is soft. There is no mass.      Tenderness: There is no abdominal tenderness. There is no guarding.      Hernia: No hernia is present.   Musculoskeletal:         General: No deformity. Normal range of motion.      Cervical back: Normal range of  motion. No rigidity.      Right lower leg: No edema.      Left lower leg: No edema.   Lymphadenopathy:      Cervical: No cervical adenopathy.   Skin:     General: Skin is warm and dry.   Neurological:      General: No focal deficit present.      Mental Status: She is alert and oriented to person, place, and time.          Lab Results  Results for orders placed or performed during the hospital encounter of 01/26/24 (from the past 24 hour(s))   POCT GLUCOSE   Result Value Ref Range    POCT Glucose 146 (H) 74 - 99 mg/dL   POCT GLUCOSE   Result Value Ref Range    POCT Glucose 230 (H) 74 - 99 mg/dL   POCT GLUCOSE   Result Value Ref Range    POCT Glucose 233 (H) 74 - 99 mg/dL        Image Results  ECG 12 lead  Sinus rhythm  Probable left atrial enlargement  Left bundle branch block       Assessment/Plan     * Ambulatory dysfunction  Assessment & Plan  PT and OT assessment.  TSH OK.  May need to consider SNF on discharge.  CT of head and neck without injury.  Will check orthostatic vital signs.  Awaiting PT and OT assessments.  Discussed with family at bedside.  B12 is low, will replace.  1/29: PT and OT have recommended skilled nursing facility.  Continue PT and OT.  I discussed the case with case management    Melena  Assessment & Plan  Patient reports some bright red blood when she has a bowel movement.  Appears to be scant amounts.  History of hemorrhoids.  Currently on aspirin and Plavix by records.  Will need to confirm.  Continue low-dose aspirin for now.  Hemoglobins have been higher in the past.  Will continue to monitor for now.  Hold on GI evaluation at this time.  1/29: Resolved    Aortic valve stenosis  Assessment & Plan  Mild aortic stenosis on echo 8/2023.    Hypertension, benign  Assessment & Plan  Running a little high.  Check for orthostatic hypotension.    Hyperlipidemia  Assessment & Plan  Continue home meds.    Diabetes mellitus, type 2 (CMS/[facility])  Assessment & Plan  On glipizide and pioglitazone as  outpatient.  Holding OHG for now.  Covering with SSI.    Cardiomyopathy (CMS/HCC)  Assessment & Plan  EF 50% with global hypokineses on echo 8/2023    CAD in native artery  Assessment & Plan  Continue treatment.    B12 deficiency anemia  Assessment & Plan  Still only 140.  B12 supplement ordered.                         Chris Ochoa MD

## 2024-01-30 NOTE — PROGRESS NOTES
Occupational Therapy                 Therapy Communication Note    Patient Name: Andree Johnson  MRN: [id number]  Today's Date: 1/30/2024     Discipline: Occupational Therapy    Missed Visit Reason: Missed Visit Reason: Patient refused    Missed Time: Attempt    Comment:

## 2024-01-30 NOTE — PROGRESS NOTES
MARCELLE Dominique RN requesting assistance from SW with helping family transition pt to LTC. SW called daughter and explained options moving forward, daughter stated pt cannot go home or to a family members home, SW explained family looking at private pay situation at facility while applying for LTC medicaid. SW explained that facility can assist family with application process and that pt's assets will need spent down. Pt's daughter was understanding of this, requested facility list from SW to start looking into options, stated she was walking into the building now. SW notified TCC who provided list, SW then met with family to give them the list and reiterated above information. Family was appreciative, requested referrals be sent to [facility] and [facility] to check bed availability and pricing. SW notified TCC, will follow as needed.    LADI Jalloh, CHARMAINE ([id number])   Care Transitions     Notified by MARCELLE Dominique RN, received call from [facility], facility has bed for patient and is waiting for family to bring in payment then can admit. No other needs identified at this time, SW available upon request should further needs arise.    LADI Jalloh, CHARMAINE ([id number])   Care Transitions

## 2024-01-31 ENCOUNTER — APPOINTMENT (OUTPATIENT)
Dept: PRIMARY CARE | Facility: CLINIC | Age: 89
End: 2024-01-31
Payer: MEDICARE

## 2024-01-31 LAB
ATRIAL RATE: 65 BPM
GLUCOSE BLD MANUAL STRIP-MCNC: 163 MG/DL (ref 74–99)
GLUCOSE BLD MANUAL STRIP-MCNC: 211 MG/DL (ref 74–99)
GLUCOSE BLD MANUAL STRIP-MCNC: 246 MG/DL (ref 74–99)
GLUCOSE BLD MANUAL STRIP-MCNC: 275 MG/DL (ref 74–99)
P AXIS: 42 DEGREES
PR INTERVAL: 203 MS
Q ONSET: 254 MS
QRS COUNT: 11 BEATS
QRS DURATION: 144 MS
QT INTERVAL: 472 MS
QTC CALCULATION(BAZETT): 491 MS
QTC FREDERICIA: 484 MS
R AXIS: -35 DEGREES
T AXIS: 70 DEGREES
T OFFSET: 490 MS
VENTRICULAR RATE: 65 BPM

## 2024-01-31 PROCEDURE — 2500000001 HC RX 250 WO HCPCS SELF ADMINISTERED DRUGS (ALT 637 FOR MEDICARE OP): Performed by: INTERNAL MEDICINE

## 2024-01-31 PROCEDURE — 99232 SBSQ HOSP IP/OBS MODERATE 35: CPT | Performed by: INTERNAL MEDICINE

## 2024-01-31 PROCEDURE — 82947 ASSAY GLUCOSE BLOOD QUANT: CPT

## 2024-01-31 PROCEDURE — 97530 THERAPEUTIC ACTIVITIES: CPT | Mod: CQ,GP

## 2024-01-31 PROCEDURE — 97530 THERAPEUTIC ACTIVITIES: CPT | Mod: GO,CO

## 2024-01-31 PROCEDURE — G0378 HOSPITAL OBSERVATION PER HR: HCPCS

## 2024-01-31 RX ORDER — LANOLIN ALCOHOL/MO/W.PET/CERES
1000 CREAM (GRAM) TOPICAL DAILY
Qty: 30 TABLET | Refills: 1
Start: 2024-02-01 | End: 2024-03-02

## 2024-01-31 RX ORDER — ONDANSETRON 4 MG/1
4 TABLET, ORALLY DISINTEGRATING ORAL EVERY 8 HOURS PRN
Qty: 20 TABLET | Refills: 0
Start: 2024-01-31

## 2024-01-31 RX ADMIN — INSULIN LISPRO 1 UNITS: 100 INJECTION, SOLUTION INTRAVENOUS; SUBCUTANEOUS at 09:28

## 2024-01-31 RX ADMIN — ASPIRIN 81 MG: 81 TABLET, COATED ORAL at 09:30

## 2024-01-31 RX ADMIN — CARVEDILOL 12.5 MG: 12.5 TABLET, FILM COATED ORAL at 09:30

## 2024-01-31 RX ADMIN — INSULIN LISPRO 2 UNITS: 100 INJECTION, SOLUTION INTRAVENOUS; SUBCUTANEOUS at 11:56

## 2024-01-31 RX ADMIN — INSULIN LISPRO 3 UNITS: 100 INJECTION, SOLUTION INTRAVENOUS; SUBCUTANEOUS at 17:00

## 2024-01-31 RX ADMIN — CARVEDILOL 12.5 MG: 12.5 TABLET, FILM COATED ORAL at 16:42

## 2024-01-31 RX ADMIN — INSULIN LISPRO 2 UNITS: 100 INJECTION, SOLUTION INTRAVENOUS; SUBCUTANEOUS at 20:30

## 2024-01-31 RX ADMIN — CYANOCOBALAMIN TAB 1000 MCG 1000 MCG: 1000 TAB at 09:30

## 2024-01-31 RX ADMIN — ATORVASTATIN CALCIUM 80 MG: 40 TABLET, FILM COATED ORAL at 20:30

## 2024-01-31 ASSESSMENT — COGNITIVE AND FUNCTIONAL STATUS - GENERAL
DRESSING REGULAR LOWER BODY CLOTHING: A LOT
DRESSING REGULAR UPPER BODY CLOTHING: A LITTLE
DAILY ACTIVITIY SCORE: 11
MOBILITY SCORE: 10
DRESSING REGULAR UPPER BODY CLOTHING: A LOT
EATING MEALS: A LITTLE
WALKING IN HOSPITAL ROOM: TOTAL
STANDING UP FROM CHAIR USING ARMS: A LOT
MOVING TO AND FROM BED TO CHAIR: A LOT
STANDING UP FROM CHAIR USING ARMS: A LOT
HELP NEEDED FOR BATHING: A LOT
CLIMB 3 TO 5 STEPS WITH RAILING: TOTAL
PERSONAL GROOMING: A LITTLE
HELP NEEDED FOR BATHING: A LOT
MOVING FROM LYING ON BACK TO SITTING ON SIDE OF FLAT BED WITH BEDRAILS: A LOT
TOILETING: TOTAL
WALKING IN HOSPITAL ROOM: TOTAL
TURNING FROM BACK TO SIDE WHILE IN FLAT BAD: A LOT
DRESSING REGULAR LOWER BODY CLOTHING: TOTAL
PERSONAL GROOMING: A LOT
TOILETING: TOTAL
MOVING TO AND FROM BED TO CHAIR: A LOT
DAILY ACTIVITIY SCORE: 15
MOBILITY SCORE: 10
TURNING FROM BACK TO SIDE WHILE IN FLAT BAD: A LOT
CLIMB 3 TO 5 STEPS WITH RAILING: TOTAL
MOVING FROM LYING ON BACK TO SITTING ON SIDE OF FLAT BED WITH BEDRAILS: A LOT

## 2024-01-31 ASSESSMENT — ENCOUNTER SYMPTOMS
FLANK PAIN: 0
CHILLS: 0
FEVER: 0
ABDOMINAL DISTENTION: 0
COLOR CHANGE: 0
PALPITATIONS: 0
COUGH: 0
DYSURIA: 0
NAUSEA: 0
DIAPHORESIS: 0
FATIGUE: 0
SORE THROAT: 0
EYE DISCHARGE: 0
VOMITING: 0
EYE ITCHING: 0
SHORTNESS OF BREATH: 0
ABDOMINAL PAIN: 0
CHEST TIGHTNESS: 0
RHINORRHEA: 0

## 2024-01-31 ASSESSMENT — PAIN - FUNCTIONAL ASSESSMENT
PAIN_FUNCTIONAL_ASSESSMENT: 0-10

## 2024-01-31 ASSESSMENT — PAIN SCALES - GENERAL
PAINLEVEL_OUTOF10: 0 - NO PAIN

## 2024-01-31 NOTE — PROGRESS NOTES
Occupational Therapy    Evaluation    Patient Name: Andree Johnson  MRN: [id number]  Today's Date: 1/29/2024  Time Calculation  Start Time: 1013  Stop Time: 1041  Time Calculation (min):  28       Assessment:  OT Assessment: OT eval completed. Pt presents below her functional baseline. Underlying limitations include poor endurance/acitivity tolerance, impaired balance and weakness. Pt current level of assist is MAX A x2 for mobility and Min to MAX A for ADLs. Pt would benefit from further OT to prevent further funcitonal decline     Prognosis: Fair  Evaluation/Treatment Tolerance: Patient limited by fatigue  End of Session Communication: Bedside nurse  End of Session Patient Position: Bed, 3 rail up, Alarm on  OT Assessment Results: Decreased ADL status, Decreased upper extremity strength, Decreased endurance, Decreased cognition, Decreased functional mobility  Prognosis: Fair  Evaluation/Treatment Tolerance: Patient limited by fatigue  Strengths: Support of extended family/friends  Barriers to Participation: Insight into problems  Past Medical History:   Diagnosis Date    Abnormal findings on diagnostic imaging of heart and coronary circulation 12/04/2018    Abnormal echocardiogram    CHF (congestive heart failure) (CMS/[facility])     Coronary angioplasty status 12/04/2018    S/P PTCA (percutaneous transluminal coronary angioplasty)    Diabetes mellitus (CMS/HCC)     Glaucoma secondary to eye trauma, right eye, mild stage 12/04/2018    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, mild stage    Hypertension     Personal history of malignant neoplasm of breast 12/04/2018    History of malignant neoplasm of breast    Personal history of other diseases of the digestive system 12/04/2018    History of constipation    Personal history of other diseases of the musculoskeletal system and connective tissue     History of arthritis    Personal history of other diseases of the musculoskeletal system and connective tissue  12/04/2018    History of chronic back pain    Personal history of other diseases of the nervous system and sense organs 12/04/2018    History of glaucoma    Personal history of other specified conditions     History of dizziness     Past Surgical History:   Procedure Laterality Date    OTHER SURGICAL HISTORY  12/04/2018    Hysterectomy    OTHER SURGICAL HISTORY  12/04/2018    Breast biopsy    OTHER SURGICAL HISTORY  12/04/2018    Colonoscopy    OTHER SURGICAL HISTORY  12/04/2018    Laparoscopy    OTHER SURGICAL HISTORY  12/04/2018    Hernia repair    OTHER SURGICAL HISTORY  12/04/2018    Cataract surgery    OTHER SURGICAL HISTORY  12/04/2018    Cholecystectomy    OTHER SURGICAL HISTORY  12/04/2018    Mastectomy       Plan:  Treatment Interventions: ADL retraining, Functional transfer training, UE strengthening/ROM, Endurance training, Patient/family training, Equipment evaluation/education, Compensatory technique education  OT Frequency: 3 times per week  OT Discharge Recommendations: Moderate intensity level of continued care  Equipment Recommended upon Discharge:  (TBD)  OT Recommended Transfer Status: Maximum assist, Assist of 2  OT - OK to Discharge: Yes (once medically cleared ok to dc to next level of care)  Treatment Interventions: ADL retraining, Functional transfer training, UE strengthening/ROM, Endurance training, Patient/family training, Equipment evaluation/education, Compensatory technique education    Subjective   Current Problem:  1. Fall, initial encounter        2. Inability to walk        3. Generalized weakness        4. Other dietary vitamin B12 deficiency anemia  cyanocobalamin (Vitamin B-12) 1,000 mcg tablet    ondansetron ODT (Zofran-ODT) 4 mg disintegrating tablet        General:  General  Reason for Referral: impaired mobility and ADLs (DX: MELENA)  Referred By: karla  Past Medical History Relevant to Rehab: 87 y/o male with recent fall at home while reutrning to bed, anal bleeding,; PMH:  GLAUCOMA, BREAST CA, HF, DM, HTN, HPL, AORTIC AAS, CAD, ANEMIA  Co-Treatment: PT  Co-Treatment Reason: co-eval to maximize safety with mobility while focusing on displine specific goals.  Prior to Session Communication: Bedside nurse  Patient Position Received: Bed, 3 rail up, Alarm on (ROOM 2326 ALERT IV)  General Comment: Pt alert and agreeable to therapy  Precautions:  Hearing/Visual Limitations: [place], GLAUCOMA  Medical Precautions: Fall precautions  Precautions Comment: ortho stat vitals. BP only on LUE  Vital Signs:  BP:  (supine 110/66 & 111/60 seated at EOB)  Pain:       Objective   Cognition:  Overall Cognitive Status: Impaired  Orientation Level: Disoriented to situation  Following Commands: Follows one step commands with increased time  Attention: Exceptions to WFL  Sustained Attention: Impaired  Safety/Judgement: Exceptions to WFL  Complex Functional Tasks: Moderate  Novel Situations: Moderate  Unable to Self-Monitor and Self-Correct Consistently: Moderate  Insight: Moderate  Task Initiation: Initiates with cues  Planning: Reduced planning skills  Organization: Mildly disorganized  Processing Speed: Delayed           Home Living:  Type of Home: House  Lives With: Alone (son lives near by)  Home Layout: One level  Bathroom Shower/Tub: Walk-in shower  Home Living Comments: Pt reports Independent with ADLs, Family brings over meals intermittently, Functional mobility with RW, family assist with household chores  Prior Function:          ADL:  Bathing Assistance: Moderate (Anticipate 2/2 to poor endurance)  UE Dressing Assistance: Minimal (Anticipate 2/2 to poor endurance)  LE Dressing Assistance: Maximal (2/2 to poor endurance, functional standing tolerance less than 30 - 60 sec. MAX A to don socks)  Toileting Assistance with Device:  (Anticipate MAX A for clothing mgmt  and hygiene)  Activity Tolerance:  Endurance: Tolerates 10 - 20 min exercise with multiple rests  Bed Mobility/Transfers:              Ambulation/Gait Training:     Sitting Balance:sat at EOB up to 8 minutes before demo signs of fatigue      Standing Balance:MAX A x2 with FWW      Modalities:     Vision: Glaucoma  Sensation:  Sensation Comment: no c/o  Strength:  Strength Comments: no Formal MMT applied, per clinical observation 3 (+)/5 grossly BUE        Hand Function:  Gross Grasp: Functional  Coordination: Functional  Extremities:   BUE AROM WFL         Outcome Measures: AMPAC score 12        Education Documentation  No documentation found.  Education Comments  No comments found.        OP EDUCATION:       Goals:  Encounter Problems       Encounter Problems (Active)       ADLs       Patient will perform UB and LB bathing  with minimal assist  level of assistance and bedside commode. (Not Progressing)       Start:  01/29/24    Expected End:  02/12/24            OT GOAL 2       Start:  01/29/24    Expected End:  02/12/24                       EXERCISE/STRENGTHENING       Patient will complete BUE exercises with sup  in order to improve strength and activity for ADL performance.  (Not Progressing)       Start:  01/29/24    Expected End:  02/12/24                       TRANSFERS       Patient will complete sit to stand transfer with minimal assist  level of assistance and front wheeled walker in order to improve safety and prepare for out of bed mobility. (Progressing)       Start:  01/29/24    Expected End:  02/12/24

## 2024-01-31 NOTE — DISCHARGE SUMMARY
Discharge Diagnosis    Ambulatory dysfunction  Morbid Obesity with a BMI of 45.59   Melena   Aortic Valve stenosis   Hypertension   Hyperlipidemia   Diabetes mellitus, type 2 (cms/hcc)   CAD in native artery   B12 deficiency anemia     Issues Requiring Follow-Up    Ambulatory dysfunction - discharging to [facility] with PT/OT   Morbid Obesity with a BMI of 45.59 - Follow up with PCP after discharge from [facility]   Melena - Follow up with PCP after discharge from [facility]   Aortic Valve stenosis - Follow up with Cardiologist after discharge from [facility]   Hypertension - Follow up with Cardiologist after discharge from [facility]  Hyperlipidemia - Follow up with PCP after discharge from [facility]   Diabetes mellitus, type 2 (cms/hcc) - Follow up with PCP after discharge from [facility]   CAD in native artery - Follow up with Cardiologist after discharge from [facility]  B12 deficiency anemia - Follow up with PCP after discharge from [facility]     Discharge Meds     Your medication list        START taking these medications        Instructions Last Dose Given Next Dose Due   cyanocobalamin 1,000 mcg tablet  Commonly known as: Vitamin B-12  Start taking on: February 1, 2024      Take 1 tablet (1,000 mcg) by mouth once daily. Do not start before February 1, 2024.       ondansetron ODT 4 mg disintegrating tablet  Commonly known as: Zofran-ODT      Take 1 tablet (4 mg) by mouth every 8 hours if needed for nausea or vomiting.              CONTINUE taking these medications        Instructions Last Dose Given Next Dose Due   acetaminophen 325 mg tablet  Commonly known as: Tylenol           aspirin 81 mg EC tablet           atorvastatin 80 mg tablet  Commonly known as: Lipitor           carvedilol 12.5 mg tablet  Commonly known as: Coreg           cholecalciferol 125 MCG (5000 UT) capsule  Commonly known as: Vitamin D-3           clopidogrel 75 mg tablet  Commonly known as: Plavix           furosemide 20 mg tablet  Commonly known as: Lasix      Take 1 tablet (20 mg) by mouth once  daily.       glimepiride 4 mg tablet  Commonly known as: Amaryl      Take 1 tablet (4 mg) by mouth 2 times a day. Dr Gray       lisinopril 20 mg tablet           OneTouch Ultra Test strip  Generic drug: blood sugar diagnostic      1 each by Not Applicable route once daily. Which ever brand preferred by patient and insurance coverage       pioglitazone 30 mg tablet  Commonly known as: Actos      Take 1 tablet (30 mg) by mouth once daily.       polyethylene glycol 17 gram/dose powder  Commonly known as: Glycolax, Miralax           Tradjenta 5 mg tablet  Generic drug: linaGLIPtin      Take 1 tablet (5 mg) by mouth once daily. Dr Gray                 Where to Get Your Medications        Information about where to get these medications is not yet available    Ask your nurse or doctor about these medications  cyanocobalamin 1,000 mcg tablet  ondansetron ODT 4 mg disintegrating tablet         Test Results Pending At Discharge  Pending Labs       No current pending labs.            Hospital Course   1/27: Patient admitted due to recurrent falls at home. CT head and neck without injury. TSH normal. Low B12 levels, starting oral cyanocobalamin for replacement.   1/28: Awaiting PT and OT eval. Monitoring vitals and labs. Considering SNF on discharge   1/29: PT/OT recommended skilled nursing facility for continued care with PT/OT 3 times weekly.   1/30: Case management assisting coordination of appropriate disposition   1/31: Family opting for long term care     Pertinent Physical Exam At Time of Discharge  Constitutional:       General: She is not in acute distress.     Appearance: Normal appearance. She is obese.   HENT:      Head: Normocephalic and atraumatic.      Nose: No congestion or rhinorrhea.   Eyes:      Extraocular Movements: Extraocular movements intact.      Pupils: Pupils are equal, round, and reactive to light.   Cardiovascular:      Rate and Rhythm: Normal rate and regular rhythm.      Heart sounds: Normal  heart sounds. No murmur heard.     No gallop.   Pulmonary:      Effort: Pulmonary effort is normal. No respiratory distress.      Breath sounds: Normal breath sounds. No stridor. No wheezing, rhonchi or rales.   Abdominal:      General: There is no distension.      Tenderness: There is no abdominal tenderness.   Skin:     General: Skin is warm and dry.   Neurological:      Mental Status: She is alert and oriented to person, place, and time.    Outpatient Follow-Up  Future Appointments   Date Time Provider Department Center   3/5/2024  1:30 PM Lenore Ayala, APRN-CNP [id number] [facility]    Discharge planning took more than 30 minutes    Pt seen and examined  with my PA student . I have modified the note to reflect my documentation of HPI and assessment and plan.    Chris Ochoa MD MRCP

## 2024-01-31 NOTE — PROGRESS NOTES
1/31/24 1451  Received a call from daughter requesting to speak with this CT supervisor about pt admission status. Daughter expressed frustration and concern about the pt being in observation status and stated that they had Medicare on the line. Reinforced what pt TCC had already discussed with pt daughter. Daughter states that Medicare states that pt is covered when admitted. Reinforced that pt is in observation status and is therefore not admitted inpt and will not qualify for SNF based on that. Daughter states not understanding why pt cannot be inpt. Informed daughter that this CT supervisor will transfer the daughter to Utilization Management who can better answer why pt does not meet inpt admission criteria. Provided daughter with their number before transferring.   Karissa Gonzalez RN, BSN, Ana/ [place] CT Supervisor

## 2024-01-31 NOTE — PROGRESS NOTES
Occupational Therapy    Occupational Therapy Treatment    Name: Andree Johnson  MRN: [id number]  : 1935  Date: 24  Time Calculation  Start Time: 1010  Stop Time: 1022  Time Calculation (min): 12 min    Assessment:  OT Assessment: pt tolerated session fairly, however would benefit from continued OT services to improve fxl performance and increase strenght required for BADL tasks and increasing [place]  Prognosis: Fair  Evaluation/Treatment Tolerance: Patient limited by fatigue  End of Session Communication: Bedside nurse  End of Session Patient Position: Bed, 3 rail up, Alarm on  Plan:  Treatment Interventions: ADL retraining, Functional transfer training, Endurance training  OT Frequency: 3 times per week  OT Discharge Recommendations: Moderate intensity level of continued care  Equipment Recommended upon Discharge:  (TBD)  OT Recommended Transfer Status: Maximum assist, Assist of 2  OT - OK to Discharge: Yes (once medically cleared ok to dc to next level of care)    Subjective   Previous Visit Info:  OT Last Visit  OT Received On: 24  General:  General  Reason for Referral: IMAIRED MOBILTYI, GAIT TRAINING, I MPEIARED COGNITION/SAFETY AWARENESS  Referred By: MECHE  Past Medical History Relevant to Rehab: FALL, RECENT BLACK TARRY STOOLS; DX: MELENA; HX: GLAUCOMA, BREAST CA, HF, DM, HTN, HPL, AORTIC AAS, CAD, ANEMIA  Co-Treatment: PT  Co-Treatment Reason: FACILITATE MOBILITY SAFETY  Prior to Session Communication: Bedside nurse  Patient Position Received: Bed, 3 rail up, Alarm on  General Comment: pt laying in bed upon arrival and agreeeable to OT tx  Precautions:     Vitals:     Pain Assessment:  Pain Assessment  Pain Assessment: 0-10  Pain Score: 0 - No pain     Objective     Functional Standing Tolerance:  Functional Standing Tolerance  Time: 30-40 seconds x1 STS  Bed Mobility/Transfers: Bed Mobility  Bed Mobility: Yes  Bed Mobility 1  Bed Mobility 1: Supine to sitting  Level of Assistance  1: Minimum assistance  Bed Mobility 2  Bed Mobility  2: Sitting to supine  Level of Assistance 2: Maximum assistance  Bed Mobility Comments 2: maxAx2    Transfers  Transfer: Yes  Transfer 1  Transfer From 1: Bed to  Transfer to 1: Stand  Technique 1: Sit to stand  Transfer Device 1: Walker  Transfer Level of Assistance 1: Moderate assistance  Trials/Comments 1: modAx2  Transfers 2  Technique 2: Stand to sit  Transfer Device 2: Walker  Transfer Level of Assistance 2: +2, Moderate assistance    Sitting Balance:  Static Sitting Balance  Static Sitting-Balance Support: Bilateral upper extremity supported  Static Sitting-Comment/Number of Minutes: required min-cga for static sitting balance, unable to get feet flat on the floor  Standing Balance:  Static Standing Balance  Static Standing-Balance Support: Bilateral upper extremity supported  Static Standing-Level of Assistance: Maximum assistance  Static Standing-Comment/Number of Minutes: maxAx2 with FWW       Therapy/Activity: Balance/Neuromuscular Re-Education  Balance/Neuromuscular Re-Education Activity Performed: Yes  Balance/Neuromuscular Re-Education Activity 1: pt sat EOB for ~5 minutes to improve sitting tolerance and activity tolerance required to increase strength and endurance to complete BADL tasks safety and independently. CGA-Chris for sitting balance, no LOB noted, mild L lean      Outcome Measures:  [facility] Daily Activity  Putting on and taking off regular lower body clothing: A lot  Bathing (including washing, rinsing, drying): A lot  Putting on and taking off regular upper body clothing: A little  Toileting, which includes using toilet, bedpan or urinal: Total  Taking care of personal grooming such as brushing teeth: A little  Eating Meals: None  Daily Activity - Total Score: 15        Education Documentation  Body Mechanics, taught by SHYAM Scott at 1/31/2024 12:07 PM.  Learner: Patient  Readiness: Acceptance  Method: Explanation  Response:  Verbalizes Understanding    Precautions, taught by SHYAM Scott at 1/31/2024 12:07 PM.  Learner: Patient  Readiness: Acceptance  Method: Explanation  Response: Verbalizes Understanding    ADL Training, taught by SHYAM Scott at 1/31/2024 12:07 PM.  Learner: Patient  Readiness: Acceptance  Method: Explanation  Response: Verbalizes Understanding    Education Comments  No comments found.      Goals:  Encounter Problems       Encounter Problems (Active)       ADLs       Patient will perform UB and LB bathing  with minimal assist  level of assistance and bedside commode. (Not Progressing)       Start:  01/29/24    Expected End:  02/12/24            OT GOAL 2       Start:  01/29/24    Expected End:  02/12/24                     EXERCISE/STRENGTHENING       Patient will complete BUE exercises with sup  in order to improve strength and activity for ADL performance.  (Not Progressing)       Start:  01/29/24    Expected End:  02/12/24                 TRANSFERS       Patient will complete sit to stand transfer with minimal assist  level of assistance and front wheeled walker in order to improve safety and prepare for out of bed mobility. (Progressing)       Start:  01/29/24    Expected End:  02/12/24

## 2024-01-31 NOTE — PROGRESS NOTES
Physical Therapy  Physical Therapy Treatment    Patient Name: Andree Johnson  MRN: [id number]  Today's Date: 1/31/2024  Time Calculation  Start Time: 1011  Stop Time: 1022  Time Calculation (min): 11 min     Assessment/Plan   PT Plan  Treatment/Interventions: Bed mobility, Transfer training, Strengthening, Endurance training  PT Plan: Skilled PT  PT Frequency: 3 times per week  PT Discharge Recommendations: Moderate intensity level of continued care  Equipment Recommended upon Discharge:  (TBD)  PT Recommended Transfer Status: Assist x2 (BED LEVEL)  PT - OK to Discharge: Yes (WHEN MEDICALLY CLEARED TO NEXT LOC)    General Visit Information:   PT  Visit  PT Received On: 01/31/24  Response to Previous Treatment: Patient with no complaints from previous session.  Reason for Referral: IMAIRED MOBILTYI, GAIT TRAINING, I MPEIARED COGNITION/SAFETY AWARENESS  Room: [facility]    Subjective   Precautions:  Falls     Objective   Pain:  Pain Score: 0 - No pain    Cognition:  Oriented X4    Activity Tolerance:  Activity Tolerance  Endurance: Tolerates 10 - 20 min exercise with multiple rests    Treatments:  Therapeutic Exercise:   8 minute sit EOB, SBA  Various reaching and self righting tasks while sitting EOB    Bed Mobility  Supine to sitting: modA  Sitting to supine: maxA  Rolling: maxA    Transfers  Sit to stand: modA+2  Stand to sit: modA+2  Transfer Device: Rolling Walker      Other Activity  Other Activity Performed:  (bed pre/post tx)    Outcome Measures:  [facility] Basic Mobility  Turning from your back to your side while in a flat bed without using bedrails: A lot  Moving from lying on your back to sitting on the side of a flat bed without using bedrails: A lot  Moving to and from bed to chair (including a wheelchair): A lot  Standing up from a chair using your arms (e.g. wheelchair or bedside chair): A lot  To walk in hospital room: Total  Climbing 3-5 steps with railing: Total  Basic Mobility - Total Score: 10    Education  Documentation  Mobility Training, taught by Deonte Andrew PTA at 1/31/2024 11:57 AM.  Learner: Patient  Readiness: Acceptance  Method: Explanation, Demonstration  Response: Needs Reinforcement    Education Comments  No comments found.        OP EDUCATION:       Encounter Problems       Encounter Problems (Active)       Balance       Goal 1 (Progressing)       Start:  01/29/24    Expected End:  02/05/24       SIT EOB FOR 15 MIN W/ SBA WHILE COMPLETING EX AND FUNCTIONAL ACTIVITIES TO IMPROVE STRENGTH, BALANCE, ENDURANCE FOR PROGRESSION OF ACTIVITIES            Mobility       STRENGTHENING (Progressing)       Start:  01/29/24    Expected End:  02/12/24       20 REPS RROM EX INCREASING STRENGTH TO STABILIZE OOB ACTIVITIES            Pain - Adult          Transfers       STG - Patient to transfer to and from sit to supine (Progressing)       Start:  01/29/24    Expected End:  02/07/24       HOB FLAT, NO RAIL MIN A X1         STG - Patient will transfer sit to and from stand (Progressing)       Start:  01/29/24    Expected End:  02/12/24       FWW MIN X1-2 USING PROPER TECHNIQUE

## 2024-01-31 NOTE — PROGRESS NOTES
"Andree Johnson is a 88 y.o. female on day 0 of admission presenting with Ambulatory dysfunction.      Subjective   Andree Johnson is a 88 y.o. female presenting with recurrent fall.     88-year-old female admitted 1/27/2024 secondary to recurrent falls at home.  PMH is significant for chronic systolic heart failure, DM2, HTN, HPL, mild aortic AAS, B12 deficiency, vitamin D deficiency.  Patient apparently fell at home around 1901/26.  Patient states she was returning to her bed and was going to lie down.  Uses a rollator to ambulate and states the rollator scooted away from her.  Was brought to the ED for evaluation.  Patient states she does not typically fall but history provided notes that she has had multiple episodes of falling.  Lives by herself although son lives very close.  ED nursing reports they were barely able to get her up and out of bed.  Admitting to evaluate for recurrent fall.  Will have PT assess patient.  CT scan of head and neck was negative for acute injury.  Laboratory results note mild anemia.  Hemoglobin is 10.4.  Patient reports some bright red blood when she has a BM.  It is scant amounts and she does have a history of hemorrhoids.  Currently on DAPT by records.  Will need to verify this is the case.  Hold Plavix for now but continue aspirin.  States her glucoses have been controlled.  Will await input from PT and OT.  May need to consider skilled nursing on discharge.     1/28: Pt seen. Awaiting PT and OT eval. Lab work looks OK. B12 is pretty low. Will start oral meds. Family at bedside. Discussed planning, need recommendations from PT. Still has some spotting with BM's. May need to consider SNF on discharge.     1/29: No acute events overnight.  Patient denies any new symptoms other than weakness.  She denies any abdominal pain chest pain, shortness of breath, nausea or vomiting.  Awaiting PT and OT recommendations.        1/30: Patient seen today and states she feels \"sick to her " "stomach\" She has had multiple episodes of nausea and vomiting this morning. She denies any abdominal pain, fever, chills, or diaphoresis.     1/31: No acute events over night. Nausea and vomiting has resolved. Family would like to opt for long term care. Plan for discharge today     Review of Systems   Constitutional:  Negative for chills, diaphoresis, fatigue and fever.   HENT:  Negative for congestion, rhinorrhea and sore throat.    Eyes:  Negative for discharge and itching.   Respiratory:  Negative for cough, chest tightness and shortness of breath.    Cardiovascular:  Negative for chest pain and palpitations.   Gastrointestinal:  Negative for abdominal distention, abdominal pain, nausea and vomiting.   Genitourinary:  Negative for dysuria and flank pain.   Skin:  Negative for color change, pallor and rash.      Objective     Last Recorded Vitals  /73 (BP Location: Left arm, Patient Position: Lying)   Pulse 68   Temp 36.6 °C (97.9 °F) (Temporal)   Resp 16   Wt 113 kg (249 lb)   SpO2 95%   Intake/Output last 3 Shifts:    Intake/Output Summary (Last 24 hours) at 1/31/2024 1051  Last data filed at 1/31/2024 0207  Gross per 24 hour   Intake 840 ml   Output 200 ml   Net 640 ml       Admission Weight  Weight: 115 kg (253 lb 8.5 oz) (01/26/24 2032)    Daily Weight  01/27/24 : 113 kg (249 lb)    Image Results  ECG 12 lead  Sinus rhythm  Probable left atrial enlargement  Left bundle branch block      Physical Exam  Constitutional:       General: She is not in acute distress.     Appearance: Normal appearance. She is obese.   HENT:      Head: Normocephalic and atraumatic.      Nose: No congestion or rhinorrhea.   Eyes:      Extraocular Movements: Extraocular movements intact.      Pupils: Pupils are equal, round, and reactive to light.   Cardiovascular:      Rate and Rhythm: Normal rate and regular rhythm.      Heart sounds: Normal heart sounds. No murmur heard.     No gallop.   Pulmonary:      Effort: Pulmonary " effort is normal. No respiratory distress.      Breath sounds: Normal breath sounds. No stridor. No wheezing, rhonchi or rales.   Abdominal:      General: There is no distension.      Tenderness: There is no abdominal tenderness.   Skin:     General: Skin is warm and dry.   Neurological:      Mental Status: She is alert and oriented to person, place, and time.         Relevant Results               Assessment/Plan      * Ambulatory dysfunction  Assessment & Plan  PT/OT assessment   TSH OK.  May need to consider SNF on discharge.  CT of head and neck without injury.  Will check orthostatic vital signs.  Awaiting PT and OT assessments.  Discussed with family at bedside.  B12 is low, will replace.  1/29: PT and OT have recommended skilled nursing facility.  Continue PT and OT.  I discussed the case with case management.  1/30: Continuing communication with family and case management to assure safe disposition .  1/31: Patient and family would like to opt to long term care facility. Social work is following to assist in the process. Plan for discharge today      *Morbid Obesity with a BMI of 45.59        Melena  Assessment & Plan  Patient reports some bright red blood when she has a bowel movement.  Appears to be scant amounts.  History of hemorrhoids.  Currently on aspirin and Plavix by records.  Will need to confirm.  Continue low-dose aspirin for now.  Hemoglobins have been higher in the past.  Will continue to monitor for now.  Hold on GI evaluation at this time.  1/29: Resolved     Aortic valve stenosis  Assessment & Plan  Mild aortic stenosis on echo 8/2023.     Hypertension, benign  Assessment & Plan  Running a little high.  Check for orthostatic hypotension.  Lisinopril currently unhold     Hyperlipidemia  Assessment & Plan  Continue home meds.     Diabetes mellitus, type 2 (CMS/[facility])  Assessment & Plan  On glipizide and pioglitazone as outpatient.  Holding OHG for now.  Covering with SSI.     Cardiomyopathy  (CMS/HCC)  Assessment & Plan  EF 50% with global hypokineses on echo 8/2023     CAD in native artery  Assessment & Plan  Continue treatment.     B12 deficiency anemia  Assessment & Plan  Still only 140.  B12 supplement ordered.               Principal Problem:    Ambulatory dysfunction  Active Problems:    B12 deficiency anemia    CAD in native artery    Cardiomyopathy (CMS/HCC)    Diabetes mellitus, type 2 (CMS/HCC)    Hyperlipidemia    Hypertension, benign    Aortic valve stenosis    Ariana Jansen    Pt seen and examined  with my PA student . I have modified the note to reflect my documentation of HPI and assessment and plan.    Discharge planning in process to SNF.    Chris Ochoa MD MRCP

## 2024-01-31 NOTE — PROGRESS NOTES
Call to pt's daughter Maye and LVM requesting return call to finalize discharge arrangements. Shortly thereafter, Maye called the main office line with Medicare also on the phone, and was questioning why patient is not admitted. CT Supervisor MICHELA Gonzalez spoke with Maye and provided the phone number to  to have her concerns addressed. I am waiting for Maye to determine family's plan for private pay at SNF. Patient has an active discharge order today.
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
## 2024-02-01 LAB
GLUCOSE BLD MANUAL STRIP-MCNC: 144 MG/DL (ref 74–99)
GLUCOSE BLD MANUAL STRIP-MCNC: 233 MG/DL (ref 74–99)
GLUCOSE BLD MANUAL STRIP-MCNC: 252 MG/DL (ref 74–99)
GLUCOSE BLD MANUAL STRIP-MCNC: 347 MG/DL (ref 74–99)

## 2024-02-01 PROCEDURE — 2500000001 HC RX 250 WO HCPCS SELF ADMINISTERED DRUGS (ALT 637 FOR MEDICARE OP): Performed by: INTERNAL MEDICINE

## 2024-02-01 PROCEDURE — 1200000002 HC GENERAL ROOM WITH TELEMETRY DAILY

## 2024-02-01 PROCEDURE — 97530 THERAPEUTIC ACTIVITIES: CPT | Mod: CQ,GP

## 2024-02-01 PROCEDURE — 82947 ASSAY GLUCOSE BLOOD QUANT: CPT

## 2024-02-01 RX ADMIN — INSULIN LISPRO 4 UNITS: 100 INJECTION, SOLUTION INTRAVENOUS; SUBCUTANEOUS at 21:49

## 2024-02-01 RX ADMIN — ATORVASTATIN CALCIUM 80 MG: 40 TABLET, FILM COATED ORAL at 21:50

## 2024-02-01 RX ADMIN — INSULIN LISPRO 3 UNITS: 100 INJECTION, SOLUTION INTRAVENOUS; SUBCUTANEOUS at 11:25

## 2024-02-01 RX ADMIN — CARVEDILOL 12.5 MG: 12.5 TABLET, FILM COATED ORAL at 21:50

## 2024-02-01 RX ADMIN — ASPIRIN 81 MG: 81 TABLET, COATED ORAL at 08:18

## 2024-02-01 RX ADMIN — CARVEDILOL 12.5 MG: 12.5 TABLET, FILM COATED ORAL at 08:18

## 2024-02-01 RX ADMIN — CYANOCOBALAMIN TAB 1000 MCG 1000 MCG: 1000 TAB at 08:18

## 2024-02-01 ASSESSMENT — COGNITIVE AND FUNCTIONAL STATUS - GENERAL
MOBILITY SCORE: 12
WALKING IN HOSPITAL ROOM: A LOT
STANDING UP FROM CHAIR USING ARMS: A LOT
DRESSING REGULAR LOWER BODY CLOTHING: TOTAL
MOVING FROM LYING ON BACK TO SITTING ON SIDE OF FLAT BED WITH BEDRAILS: A LITTLE
EATING MEALS: A LITTLE
STANDING UP FROM CHAIR USING ARMS: A LOT
DRESSING REGULAR UPPER BODY CLOTHING: A LOT
STANDING UP FROM CHAIR USING ARMS: A LOT
PERSONAL GROOMING: A LOT
MOVING TO AND FROM BED TO CHAIR: A LOT
TOILETING: TOTAL
WALKING IN HOSPITAL ROOM: A LOT
TURNING FROM BACK TO SIDE WHILE IN FLAT BAD: A LOT
EATING MEALS: A LITTLE
DRESSING REGULAR LOWER BODY CLOTHING: A LOT
DAILY ACTIVITIY SCORE: 12
DRESSING REGULAR UPPER BODY CLOTHING: A LOT
DAILY ACTIVITIY SCORE: 11
HELP NEEDED FOR BATHING: A LOT
CLIMB 3 TO 5 STEPS WITH RAILING: TOTAL
CLIMB 3 TO 5 STEPS WITH RAILING: TOTAL
TOILETING: TOTAL
MOVING FROM LYING ON BACK TO SITTING ON SIDE OF FLAT BED WITH BEDRAILS: A LITTLE
MOVING TO AND FROM BED TO CHAIR: A LOT
MOBILITY SCORE: 13
HELP NEEDED FOR BATHING: A LOT
MOVING TO AND FROM BED TO CHAIR: A LOT
WALKING IN HOSPITAL ROOM: A LOT
MOBILITY SCORE: 12
TURNING FROM BACK TO SIDE WHILE IN FLAT BAD: A LITTLE
CLIMB 3 TO 5 STEPS WITH RAILING: TOTAL
PERSONAL GROOMING: A LOT
TURNING FROM BACK TO SIDE WHILE IN FLAT BAD: A LOT
MOVING FROM LYING ON BACK TO SITTING ON SIDE OF FLAT BED WITH BEDRAILS: A LITTLE

## 2024-02-01 ASSESSMENT — ENCOUNTER SYMPTOMS
RHINORRHEA: 0
VOMITING: 0
LIGHT-HEADEDNESS: 0
SORE THROAT: 0
SHORTNESS OF BREATH: 0
PALPITATIONS: 0
DIAPHORESIS: 0
FEVER: 0
COUGH: 0
DIZZINESS: 0
JOINT SWELLING: 0
CHILLS: 0
ABDOMINAL PAIN: 0
FATIGUE: 0
NAUSEA: 0
COLOR CHANGE: 0
CHEST TIGHTNESS: 0

## 2024-02-01 ASSESSMENT — PAIN - FUNCTIONAL ASSESSMENT
PAIN_FUNCTIONAL_ASSESSMENT: 0-10
PAIN_FUNCTIONAL_ASSESSMENT: 0-10

## 2024-02-01 ASSESSMENT — PAIN SCALES - GENERAL
PAINLEVEL_OUTOF10: 0 - NO PAIN
PAINLEVEL_OUTOF10: 0 - NO PAIN

## 2024-02-01 NOTE — PROGRESS NOTES
Physical Therapy  Physical Therapy Treatment    Patient Name: Andree Johnson  MRN: [id number]  Today's Date: 2/1/2024  Time Calculation  Start Time: 0913  Stop Time: 0928  Time Calculation (min): 15 min     Assessment/Plan   PT Plan  Treatment/Interventions: Bed mobility, Transfer training, Strengthening, Endurance training  PT Plan: Skilled PT  PT Frequency: 3 times per week  PT Discharge Recommendations: Moderate intensity level of continued care  Equipment Recommended upon Discharge:  (TBD)  PT Recommended Transfer Status: Assist x2 (BED LEVEL)  PT - OK to Discharge: Yes (WHEN MEDICALLY CLEARED TO NEXT LOC)    General Visit Information:   PT  Visit  PT Received On: 02/01/24  Response to Previous Treatment: Patient with no complaints from previous session.  Reason for Referral: IMAIRED MOBILTYI, GAIT TRAINING, I MPEIARED COGNITION/SAFETY AWARENESS  Room: [facility]    Subjective   Precautions:  Falls     Objective   Pain:  Pain Score: 0 - No pain    Cognition:  Oriented X4    Activity Tolerance:  Activity Tolerance  Endurance: Tolerates 10 - 20 min exercise with multiple rests    Treatments:  Bed Mobility  Supine to sitting: Chris  Scooting: Chris    Transfers  Sit to stand: Chris  Stand to sit: Chris  Stand pivot: Chris  Transfer Device: Rolling Walker    Other Activity  Other Activity Performed:  (chair following tx)    Outcome Measures:  [facility] Basic Mobility  Turning from your back to your side while in a flat bed without using bedrails: A little  Moving from lying on your back to sitting on the side of a flat bed without using bedrails: A little  Moving to and from bed to chair (including a wheelchair): A lot  Standing up from a chair using your arms (e.g. wheelchair or bedside chair): A lot  To walk in hospital room: A lot  Climbing 3-5 steps with railing: Total  Basic Mobility - Total Score: 13    Education Documentation  Mobility Training, taught by Deonte Andrew PTA at 2/1/2024 10:22 AM.  Learner:  Patient  Readiness: Acceptance  Method: Explanation, Demonstration  Response: Needs Reinforcement    Education Comments  No comments found.        OP EDUCATION:       Encounter Problems       Encounter Problems (Active)       Balance       Goal 1 (Progressing)       Start:  01/29/24    Expected End:  02/05/24       SIT EOB FOR 15 MIN W/ SBA WHILE COMPLETING EX AND FUNCTIONAL ACTIVITIES TO IMPROVE STRENGTH, BALANCE, ENDURANCE FOR PROGRESSION OF ACTIVITIES            Mobility       STRENGTHENING (Progressing)       Start:  01/29/24    Expected End:  02/12/24       20 REPS RROM EX INCREASING STRENGTH TO STABILIZE OOB ACTIVITIES            Pain - Adult          Transfers       STG - Patient to transfer to and from sit to supine (Progressing)       Start:  01/29/24    Expected End:  02/07/24       HOB FLAT, NO RAIL MIN A X1         STG - Patient will transfer sit to and from stand (Progressing)       Start:  01/29/24    Expected End:  02/12/24       FWW MIN X1-2 USING PROPER TECHNIQUE

## 2024-02-01 NOTE — NURSING NOTE
Patient being discharged to SNF today. Report called to [facility]. IV d/c'd, insyte intact. Awaiting transportation from physicians ambualnce, due to arrive at 1700.

## 2024-02-01 NOTE — PROGRESS NOTES
02/01/24 1040   Discharge Planning   Patient expects to be discharged to: [facility]   Does the patient need discharge transport arranged? Yes   [employer] coordination needed? Yes   Has discharge transport been arranged? No   What day is the transport expected? 02/01/24     Call received from patient's daughter Maye. She continues to express concerns about patient being in OBS status. I provided her with the number for [facility] again. Family is finalizing financial arrangements with [facility] today. I spoke with Leeanna Bond at [facility], who confirmed that they are able to accept patient pending completion of financial arrangements today. Leeanna Bond will contact me once family has paid the facility. Maye is aware that I will contact her with transportation arrangements once we have confirmed everything with Leeanna Bond at [facility].     1415: Spoke with patient's daughter Maye and updated with transport time 5-6 pm tonight.

## 2024-02-01 NOTE — CARE PLAN
The patient's goals for the shift include      The clinical goals for the shift include Pt will remain free from falls and injuries overnight

## 2024-02-01 NOTE — PROGRESS NOTES
"Andree Johnson is a 88 y.o. female on day 0 of admission presenting with Ambulatory dysfunction.      Subjective   Andree Johnson is a 88 y.o. female presenting with recurrent fall.     88-year-old female admitted 1/27/2024 secondary to recurrent falls at home.  PMH is significant for chronic systolic heart failure, DM2, HTN, HPL, mild aortic AAS, B12 deficiency, vitamin D deficiency.  Patient apparently fell at home around 1901/26.  Patient states she was returning to her bed and was going to lie down.  Uses a rollator to ambulate and states the rollator scooted away from her.  Was brought to the ED for evaluation.  Patient states she does not typically fall but history provided notes that she has had multiple episodes of falling.  Lives by herself although son lives very close.  ED nursing reports they were barely able to get her up and out of bed.  Admitting to evaluate for recurrent fall.  Will have PT assess patient.  CT scan of head and neck was negative for acute injury.  Laboratory results note mild anemia.  Hemoglobin is 10.4.  Patient reports some bright red blood when she has a BM.  It is scant amounts and she does have a history of hemorrhoids.  Currently on DAPT by records.  Will need to verify this is the case.  Hold Plavix for now but continue aspirin.  States her glucoses have been controlled.  Will await input from PT and OT.  May need to consider skilled nursing on discharge.     1/28: Pt seen. Awaiting PT and OT eval. Lab work looks OK. B12 is pretty low. Will start oral meds. Family at bedside. Discussed planning, need recommendations from PT. Still has some spotting with BM's. May need to consider SNF on discharge.     1/29: No acute events overnight.  Patient denies any new symptoms other than weakness.  She denies any abdominal pain chest pain, shortness of breath, nausea or vomiting.  Awaiting PT and OT recommendations.        1/30: Patient seen today and states she feels \"sick to her " "stomach\" She has had multiple episodes of nausea and vomiting this morning. She denies any abdominal pain, fever, chills, or diaphoresis.      1/31: No acute events over night. Nausea and vomiting has resolved. Family would like to opt for long term care. Plan for discharge today    2/1: Patient seen today and is feeling well. Daughter called yesterday and is curious about patient admission status vs observation status. Patient in contact with Care Transition supervisor. family will plan for private pay at [facility]. Patient to be transported today.     Review of Systems   Constitutional:  Negative for chills, diaphoresis, fatigue and fever.   HENT:  Negative for congestion, rhinorrhea and sore throat.    Respiratory:  Negative for cough, chest tightness and shortness of breath.    Cardiovascular:  Negative for chest pain and palpitations.   Gastrointestinal:  Negative for abdominal pain, nausea and vomiting.   Musculoskeletal:  Negative for joint swelling.   Skin:  Negative for color change, pallor and rash.   Neurological:  Negative for dizziness and light-headedness.            Objective     Last Recorded Vitals  /62   Pulse 72   Temp 36.4 °C (97.6 °F)   Resp 18   Wt 113 kg (249 lb)   SpO2 93%   Intake/Output last 3 Shifts:    Intake/Output Summary (Last 24 hours) at 2/1/2024 0921  Last data filed at 2/1/2024 0700  Gross per 24 hour   Intake 460 ml   Output --   Net 460 ml       Admission Weight  Weight: 115 kg (253 lb 8.5 oz) (01/26/24 2032)    Daily Weight  01/27/24 : 113 kg (249 lb)    Image Results  ECG 12 lead  Sinus rhythm  Probable left atrial enlargement  Left bundle branch block    See ED provider note for full interpretation and clinical correlation  Confirmed by Hesham De Paz ([id number]) on 1/31/2024 5:47:00 PM      Physical Exam  Constitutional:       General: She is not in acute distress.     Appearance: Normal appearance. She is obese.   HENT:      Head: Normocephalic and atraumatic.      " Mouth/Throat:      Mouth: Mucous membranes are moist.   Eyes:      Extraocular Movements: Extraocular movements intact.      Pupils: Pupils are equal, round, and reactive to light.   Cardiovascular:      Rate and Rhythm: Normal rate and regular rhythm.      Heart sounds: Normal heart sounds.   Pulmonary:      Effort: No respiratory distress.      Breath sounds: Normal breath sounds. No stridor. No wheezing, rhonchi or rales.   Abdominal:      General: There is no distension.      Palpations: Abdomen is soft.      Tenderness: There is no abdominal tenderness.   Skin:     General: Skin is warm and dry.   Neurological:      Mental Status: She is alert and oriented to person, place, and time.         Relevant Results               Assessment/Plan      * Ambulatory dysfunction  Assessment & Plan  PT/OT assessment   TSH OK.  May need to consider SNF on discharge.  CT of head and neck without injury.  Will check orthostatic vital signs.  Awaiting PT and OT assessments.  Discussed with family at bedside.  B12 is low, will replace.  1/29: PT and OT have recommended skilled nursing facility.  Continue PT and OT.  I discussed the case with case management.  1/30: Continuing communication with family and case management to assure safe disposition .  1/31: Patient and family would like to opt to long term care facility. Social work is following to assist in the process. Awaiting Family decision for private pay at SNF. Patient's daughter is in contact with care transition supervisor   2/1: Plan for discharge and transport to SNF today      *Morbid Obesity with a BMI of 45.59        Melena  Assessment & Plan  Patient reports some bright red blood when she has a bowel movement.  Appears to be scant amounts.  History of hemorrhoids.  Currently on aspirin and Plavix by records.  Will need to confirm.  Continue low-dose aspirin for now.  Hemoglobins have been higher in the past.  Will continue to monitor for now.  Hold on GI  evaluation at this time.  1/29: Resolved     Aortic valve stenosis  Assessment & Plan  Mild aortic stenosis on echo 8/2023.     Hypertension, benign  Assessment & Plan  Running a little high.  Check for orthostatic hypotension.  Lisinopril currently unhold     Hyperlipidemia  Assessment & Plan  Continue home meds.     Diabetes mellitus, type 2 (CMS/HCC)  Assessment & Plan  On glipizide and pioglitazone as outpatient.  Holding OHG for now.  Covering with SSI.     Cardiomyopathy (CMS/HCC)  Assessment & Plan  EF 50% with global hypokineses on echo 8/2023     CAD in native artery  Assessment & Plan  Continue treatment.     B12 deficiency anemia  Assessment & Plan  Still only 140.  B12 supplement ordered.         Principal Problem:    Ambulatory dysfunction  Active Problems:    B12 deficiency anemia    CAD in native artery    Cardiomyopathy (CMS/HCC)    Diabetes mellitus, type 2 (CMS/HCC)    Hyperlipidemia    Hypertension, benign    Aortic valve stenosis    Ariana Felipe PA-S     Pt seen and examined  with my PA student . I have modified the note to reflect my documentation of HPI and assessment and plan.    Chris Ochoa MD MRCP

## 2024-02-01 NOTE — CARE PLAN
The patient's goals for the shift include      The clinical goals for the shift include Pt remain free from falls    Over the shift, the patient made progress towards goals      Problem: Pain - Adult  Goal: Verbalizes/displays adequate comfort level or baseline comfort level  Outcome: Progressing     Problem: Safety - Adult  Goal: Free from fall injury  Outcome: Progressing     Problem: Discharge Planning  Goal: Discharge to home or other facility with appropriate resources  Outcome: Progressing     Problem: Chronic Conditions and Co-morbidities  Goal: Patient's chronic conditions and co-morbidity symptoms are monitored and maintained or improved  Outcome: Progressing     Problem: Fall/Injury  Goal: Not fall by end of shift  Outcome: Progressing  Goal: Be free from injury by end of the shift  Outcome: Progressing  Goal: Verbalize understanding of personal risk factors for fall in the hospital  Outcome: Progressing  Goal: Verbalize understanding of risk factor reduction measures to prevent injury from fall in the home  Outcome: Progressing  Goal: Use assistive devices by end of the shift  Outcome: Progressing  Goal: Pace activities to prevent fatigue by end of the shift  Outcome: Progressing     Problem: Diabetes  Goal: Achieve decreasing blood glucose levels by end of shift  Outcome: Progressing  Goal: Increase stability of blood glucose readings by end of shift  Outcome: Progressing  Goal: Maintain electrolyte levels within acceptable range throughout shift  Outcome: Progressing  Goal: Maintain glucose levels >70mg/dl to <250mg/dl throughout shift  Outcome: Progressing  Goal: No changes in neurological exam by end of shift  Outcome: Progressing  Goal: Learn about and adhere to nutrition recommendations by end of shift  Outcome: Progressing  Goal: Vital signs within normal range for age by end of shift  Outcome: Progressing  Goal: Increase self care and/or family involovement by end of shift  Outcome:  Progressing  Goal: Receive DSME education by end of shift  Outcome: Progressing     Problem: Skin  Goal: Participates in plan/prevention/treatment measures  Outcome: Progressing  Flowsheets (Taken 1/31/2024 2341)  Participates in plan/prevention/treatment measures:   Elevate heels   Increase activity/out of bed for meals  Goal: Prevent/manage excess moisture  Outcome: Progressing  Flowsheets (Taken 1/31/2024 2341)  Prevent/manage excess moisture: Cleanse incontinence/protect with barrier cream  Goal: Prevent/minimize sheer/friction injuries  Outcome: Progressing  Flowsheets (Taken 1/31/2024 2341)  Prevent/minimize sheer/friction injuries:   Turn/reposition every 2 hours/use positioning/transfer devices   HOB 30 degrees or less  Goal: Promote/optimize nutrition  Outcome: Progressing  Flowsheets (Taken 1/31/2024 2341)  Promote/optimize nutrition:   Offer water/supplements/favorite foods   Monitor/record intake including meals   Assist with feeding   Consume > 50% meals/supplements  Goal: Decreased wound size/increased tissue granulation at next dressing change  Outcome: Progressing  Flowsheets (Taken 1/31/2024 2341)  Decreased wound size/increased tissue granulation at next dressing change: Promote sleep for wound healing  Goal: Promote skin healing  Outcome: Progressing  Flowsheets (Taken 1/31/2024 2341)  Promote skin healing: Turn/reposition every 2 hours/use positioning/transfer devices

## 2024-02-02 VITALS
RESPIRATION RATE: 20 BRPM | TEMPERATURE: 98.1 F | SYSTOLIC BLOOD PRESSURE: 166 MMHG | OXYGEN SATURATION: 96 % | WEIGHT: 249 LBS | BODY MASS INDEX: 45.82 KG/M2 | DIASTOLIC BLOOD PRESSURE: 80 MMHG | HEART RATE: 70 BPM | HEIGHT: 62 IN

## 2024-02-02 PROCEDURE — 99239 HOSP IP/OBS DSCHRG MGMT >30: CPT | Performed by: INTERNAL MEDICINE

## 2024-02-02 NOTE — NURSING NOTE
Pt discharged to [facility].  No distress.  Report given to [facility].  Gail, staff at [facility], updated that pt leaving now.  Pt off unit via cot with belongings.

## 2024-02-02 NOTE — CARE PLAN
The patient's goals for the shift include      The clinical goals for the shift include Pt will remain free from falls and injuries overnight        Problem: Pain - Adult  Goal: Verbalizes/displays adequate comfort level or baseline comfort level  Outcome: Progressing     Problem: Safety - Adult  Goal: Free from fall injury  Outcome: Progressing     Problem: Discharge Planning  Goal: Discharge to home or other facility with appropriate resources  Outcome: Progressing     Problem: Chronic Conditions and Co-morbidities  Goal: Patient's chronic conditions and co-morbidity symptoms are monitored and maintained or improved  Outcome: Progressing     Problem: Fall/Injury  Goal: Not fall by end of shift  Outcome: Progressing  Goal: Be free from injury by end of the shift  Outcome: Progressing  Goal: Verbalize understanding of personal risk factors for fall in the hospital  Outcome: Progressing  Goal: Verbalize understanding of risk factor reduction measures to prevent injury from fall in the home  Outcome: Progressing  Goal: Use assistive devices by end of the shift  Outcome: Progressing  Goal: Pace activities to prevent fatigue by end of the shift  Outcome: Progressing     Problem: Diabetes  Goal: Achieve decreasing blood glucose levels by end of shift  Outcome: Progressing  Goal: Increase stability of blood glucose readings by end of shift  Outcome: Progressing  Goal: Maintain electrolyte levels within acceptable range throughout shift  Outcome: Progressing  Goal: Maintain glucose levels >70mg/dl to <250mg/dl throughout shift  Outcome: Progressing  Goal: No changes in neurological exam by end of shift  Outcome: Progressing  Goal: Learn about and adhere to nutrition recommendations by end of shift  Outcome: Progressing  Goal: Vital signs within normal range for age by end of shift  Outcome: Progressing  Goal: Increase self care and/or family involovement by end of shift  Outcome: Progressing  Goal: Receive DSME education  by end of shift  Outcome: Progressing     Problem: Skin  Goal: Participates in plan/prevention/treatment measures  Outcome: Progressing  Goal: Prevent/manage excess moisture  Outcome: Progressing  Flowsheets (Taken 2/1/2024 2356)  Prevent/manage excess moisture: Cleanse incontinence/protect with barrier cream  Goal: Prevent/minimize sheer/friction injuries  Outcome: Progressing  Goal: Promote/optimize nutrition  Outcome: Progressing  Goal: Decreased wound size/increased tissue granulation at next dressing change  Outcome: Progressing  Goal: Promote skin healing  Outcome: Progressing  Flowsheets (Taken 2/1/2024 2359)  Promote skin healing: Turn/reposition every 2 hours/use positioning/transfer devices

## 2024-02-04 NOTE — DOCUMENTATION CLARIFICATION NOTE
PATIENT:               ALL CHEN  ACCT #:                  [id number]  MRN:                       [id number]  :                       1935  ADMIT DATE:       2024 8:29 PM  DISCH DATE:        2024 3:05 AM  RESPONDING PROVIDER #:        [id number]          PROVIDER RESPONSE TEXT:    Acute Kidney Injury, POA, now resolving/resolved    CDI QUERY TEXT:    [email or web address]    Instruction:    Based on your assessment of the patient and the clinical information, please provide the requested documentation by clicking on the appropriate radio button and enter any additional information if prompted.    Question: Please clarify a diagnosis for the patient renal status    When answering this query, please exercise your independent professional judgment. The fact that a question is being asked, does not imply that any particular answer is desired or expected.    The patient's clinical indicators include:  Clinical Information: Patient admitted with ambulatory dysfunction and melena    Clinical Indicators: 24  Blood Urea Nitrogen: 36    24  Blood Urea Nitrogen: 34    24  Blood Urea Nitrogen: 25    24  Creatinine: 1.15    24  Creatinine: 0.98    24  Creatinine: 0.74    Treatment: cont infusion LR@75cc/hr, monitoring renal functions    Risk Factors: 88yof admitted with ambulatory dysfunction and melena  Options provided:  -- Acute Kidney Injury, POA, now resolving/resolved  -- Other - I will add my own diagnosis  -- Refer to Clinical Documentation Reviewer    Query created by: Chyna Nunez on 2024 12:27 PM      Electronically signed by:  STORM CARNEY MD 2/3/2024 9:10 PM

## 2024-02-23 ENCOUNTER — APPOINTMENT (OUTPATIENT)
Dept: RADIOLOGY | Facility: HOSPITAL | Age: 89
DRG: 069 | End: 2024-02-23
Payer: MEDICARE

## 2024-02-23 ENCOUNTER — APPOINTMENT (OUTPATIENT)
Dept: CARDIOLOGY | Facility: HOSPITAL | Age: 89
DRG: 069 | End: 2024-02-23
Payer: MEDICARE

## 2024-02-23 ENCOUNTER — HOSPITAL ENCOUNTER (INPATIENT)
Facility: HOSPITAL | Age: 89
LOS: 3 days | Discharge: NURSING, REHAB OR LONG-TERM CARE | DRG: 069 | End: 2024-02-26
Attending: EMERGENCY MEDICINE | Admitting: STUDENT IN AN ORGANIZED HEALTH CARE EDUCATION/TRAINING PROGRAM
Payer: MEDICARE

## 2024-02-23 DIAGNOSIS — I25.10 CAD IN NATIVE ARTERY: ICD-10-CM

## 2024-02-23 DIAGNOSIS — D51.9 ANEMIA DUE TO VITAMIN B12 DEFICIENCY, UNSPECIFIED B12 DEFICIENCY TYPE: ICD-10-CM

## 2024-02-23 DIAGNOSIS — I42.9 CARDIOMYOPATHY, UNSPECIFIED TYPE (MULTI): ICD-10-CM

## 2024-02-23 DIAGNOSIS — G45.9 TIA (TRANSIENT ISCHEMIC ATTACK): Primary | ICD-10-CM

## 2024-02-23 DIAGNOSIS — I10 HYPERTENSION, BENIGN: ICD-10-CM

## 2024-02-23 PROBLEM — R17 ELEVATED BILIRUBIN: Status: ACTIVE | Noted: 2024-02-23

## 2024-02-23 PROBLEM — D64.9 NORMOCYTIC ANEMIA: Status: ACTIVE | Noted: 2024-02-23

## 2024-02-23 PROBLEM — I61.9 CVA (CEREBROVASCULAR ACCIDENT DUE TO INTRACEREBRAL HEMORRHAGE) (MULTI): Status: ACTIVE | Noted: 2024-02-23

## 2024-02-23 PROBLEM — I61.9 CVA (CEREBROVASCULAR ACCIDENT DUE TO INTRACEREBRAL HEMORRHAGE) (MULTI): Status: RESOLVED | Noted: 2024-02-23 | Resolved: 2024-02-23

## 2024-02-23 PROBLEM — R73.9 HYPERGLYCEMIA: Status: ACTIVE | Noted: 2024-02-23

## 2024-02-23 LAB
ALBUMIN SERPL BCP-MCNC: 3 G/DL (ref 3.4–5)
ALP SERPL-CCNC: 70 U/L (ref 33–136)
ALT SERPL W P-5'-P-CCNC: 14 U/L (ref 7–45)
ANION GAP SERPL CALC-SCNC: 10 MMOL/L (ref 10–20)
APPEARANCE UR: CLEAR
APTT PPP: 27 SECONDS (ref 27–38)
AST SERPL W P-5'-P-CCNC: 18 U/L (ref 9–39)
BASOPHILS # BLD AUTO: 0.03 X10*3/UL (ref 0–0.1)
BASOPHILS NFR BLD AUTO: 0.5 %
BILIRUB DIRECT SERPL-MCNC: 0.2 MG/DL (ref 0–0.3)
BILIRUB SERPL-MCNC: 1.3 MG/DL (ref 0–1.2)
BILIRUB UR STRIP.AUTO-MCNC: NEGATIVE MG/DL
BUN SERPL-MCNC: 26 MG/DL (ref 6–23)
CALCIUM SERPL-MCNC: 8.4 MG/DL (ref 8.6–10.3)
CARDIAC TROPONIN I PNL SERPL HS: 9 NG/L (ref 0–13)
CHLORIDE SERPL-SCNC: 103 MMOL/L (ref 98–107)
CO2 SERPL-SCNC: 28 MMOL/L (ref 21–32)
COLOR UR: YELLOW
CREAT SERPL-MCNC: 0.9 MG/DL (ref 0.5–1.05)
EGFRCR SERPLBLD CKD-EPI 2021: 62 ML/MIN/1.73M*2
EOSINOPHIL # BLD AUTO: 0.26 X10*3/UL (ref 0–0.4)
EOSINOPHIL NFR BLD AUTO: 4.3 %
ERYTHROCYTE [DISTWIDTH] IN BLOOD BY AUTOMATED COUNT: 14.1 % (ref 11.5–14.5)
GLUCOSE BLD MANUAL STRIP-MCNC: 196 MG/DL (ref 74–99)
GLUCOSE BLD MANUAL STRIP-MCNC: 258 MG/DL (ref 74–99)
GLUCOSE SERPL-MCNC: 216 MG/DL (ref 74–99)
GLUCOSE UR STRIP.AUTO-MCNC: NEGATIVE MG/DL
HCT VFR BLD AUTO: 36.9 % (ref 36–46)
HGB BLD-MCNC: 11.7 G/DL (ref 12–16)
IMM GRANULOCYTES # BLD AUTO: 0.03 X10*3/UL (ref 0–0.5)
IMM GRANULOCYTES NFR BLD AUTO: 0.5 % (ref 0–0.9)
INR PPP: 1.1 (ref 0.9–1.1)
KETONES UR STRIP.AUTO-MCNC: NEGATIVE MG/DL
LEUKOCYTE ESTERASE UR QL STRIP.AUTO: NEGATIVE
LYMPHOCYTES # BLD AUTO: 1.4 X10*3/UL (ref 0.8–3)
LYMPHOCYTES NFR BLD AUTO: 23.2 %
MCH RBC QN AUTO: 31.5 PG (ref 26–34)
MCHC RBC AUTO-ENTMCNC: 31.7 G/DL (ref 32–36)
MCV RBC AUTO: 99 FL (ref 80–100)
MONOCYTES # BLD AUTO: 0.53 X10*3/UL (ref 0.05–0.8)
MONOCYTES NFR BLD AUTO: 8.8 %
NEUTROPHILS # BLD AUTO: 3.78 X10*3/UL (ref 1.6–5.5)
NEUTROPHILS NFR BLD AUTO: 62.7 %
NITRITE UR QL STRIP.AUTO: NEGATIVE
NRBC BLD-RTO: 0 /100 WBCS (ref 0–0)
PH UR STRIP.AUTO: 7 [PH]
PLATELET # BLD AUTO: 159 X10*3/UL (ref 150–450)
POTASSIUM SERPL-SCNC: 4.5 MMOL/L (ref 3.5–5.3)
PROT SERPL-MCNC: 5.5 G/DL (ref 6.4–8.2)
PROT UR STRIP.AUTO-MCNC: NEGATIVE MG/DL
PROTHROMBIN TIME: 12.7 SECONDS (ref 9.8–12.8)
RBC # BLD AUTO: 3.72 X10*6/UL (ref 4–5.2)
RBC # UR STRIP.AUTO: NEGATIVE /UL
SODIUM SERPL-SCNC: 136 MMOL/L (ref 136–145)
SP GR UR STRIP.AUTO: 1.01
UROBILINOGEN UR STRIP.AUTO-MCNC: <2 MG/DL
VIT B12 SERPL-MCNC: 248 PG/ML (ref 211–911)
WBC # BLD AUTO: 6 X10*3/UL (ref 4.4–11.3)

## 2024-02-23 PROCEDURE — 93005 ELECTROCARDIOGRAM TRACING: CPT

## 2024-02-23 PROCEDURE — 70450 CT HEAD/BRAIN W/O DYE: CPT

## 2024-02-23 PROCEDURE — 85025 COMPLETE CBC W/AUTO DIFF WBC: CPT | Performed by: EMERGENCY MEDICINE

## 2024-02-23 PROCEDURE — 70547 MR ANGIOGRAPHY NECK W/O DYE: CPT | Performed by: RADIOLOGY

## 2024-02-23 PROCEDURE — 36415 COLL VENOUS BLD VENIPUNCTURE: CPT | Performed by: EMERGENCY MEDICINE

## 2024-02-23 PROCEDURE — 99223 1ST HOSP IP/OBS HIGH 75: CPT | Performed by: STUDENT IN AN ORGANIZED HEALTH CARE EDUCATION/TRAINING PROGRAM

## 2024-02-23 PROCEDURE — 85610 PROTHROMBIN TIME: CPT | Performed by: EMERGENCY MEDICINE

## 2024-02-23 PROCEDURE — 70551 MRI BRAIN STEM W/O DYE: CPT

## 2024-02-23 PROCEDURE — 81003 URINALYSIS AUTO W/O SCOPE: CPT | Performed by: EMERGENCY MEDICINE

## 2024-02-23 PROCEDURE — 82947 ASSAY GLUCOSE BLOOD QUANT: CPT

## 2024-02-23 PROCEDURE — 82248 BILIRUBIN DIRECT: CPT | Performed by: EMERGENCY MEDICINE

## 2024-02-23 PROCEDURE — 99291 CRITICAL CARE FIRST HOUR: CPT | Performed by: EMERGENCY MEDICINE

## 2024-02-23 PROCEDURE — 85730 THROMBOPLASTIN TIME PARTIAL: CPT | Performed by: EMERGENCY MEDICINE

## 2024-02-23 PROCEDURE — 99223 1ST HOSP IP/OBS HIGH 75: CPT | Performed by: PSYCHIATRY & NEUROLOGY

## 2024-02-23 PROCEDURE — 2500000001 HC RX 250 WO HCPCS SELF ADMINISTERED DRUGS (ALT 637 FOR MEDICARE OP): Performed by: STUDENT IN AN ORGANIZED HEALTH CARE EDUCATION/TRAINING PROGRAM

## 2024-02-23 PROCEDURE — 70551 MRI BRAIN STEM W/O DYE: CPT | Performed by: RADIOLOGY

## 2024-02-23 PROCEDURE — 70547 MR ANGIOGRAPHY NECK W/O DYE: CPT

## 2024-02-23 PROCEDURE — 84484 ASSAY OF TROPONIN QUANT: CPT | Performed by: EMERGENCY MEDICINE

## 2024-02-23 PROCEDURE — 70544 MR ANGIOGRAPHY HEAD W/O DYE: CPT

## 2024-02-23 PROCEDURE — 70450 CT HEAD/BRAIN W/O DYE: CPT | Performed by: RADIOLOGY

## 2024-02-23 PROCEDURE — 70544 MR ANGIOGRAPHY HEAD W/O DYE: CPT | Performed by: RADIOLOGY

## 2024-02-23 PROCEDURE — 82607 VITAMIN B-12: CPT | Performed by: PSYCHIATRY & NEUROLOGY

## 2024-02-23 PROCEDURE — 1200000002 HC GENERAL ROOM WITH TELEMETRY DAILY

## 2024-02-23 RX ORDER — CARVEDILOL 12.5 MG/1
12.5 TABLET ORAL
Status: DISCONTINUED | OUTPATIENT
Start: 2024-02-23 | End: 2024-02-26 | Stop reason: HOSPADM

## 2024-02-23 RX ORDER — CLOPIDOGREL BISULFATE 75 MG/1
75 TABLET ORAL DAILY
Status: DISCONTINUED | OUTPATIENT
Start: 2024-02-23 | End: 2024-02-26 | Stop reason: HOSPADM

## 2024-02-23 RX ORDER — LISINOPRIL 20 MG/1
20 TABLET ORAL 2 TIMES DAILY
Status: DISCONTINUED | OUTPATIENT
Start: 2024-02-23 | End: 2024-02-26 | Stop reason: HOSPADM

## 2024-02-23 RX ORDER — PANTOPRAZOLE SODIUM 40 MG/10ML
40 INJECTION, POWDER, LYOPHILIZED, FOR SOLUTION INTRAVENOUS
Status: DISCONTINUED | OUTPATIENT
Start: 2024-02-24 | End: 2024-02-26 | Stop reason: HOSPADM

## 2024-02-23 RX ORDER — BISACODYL 10 MG/1
10 SUPPOSITORY RECTAL DAILY PRN
Status: DISCONTINUED | OUTPATIENT
Start: 2024-02-23 | End: 2024-02-26 | Stop reason: HOSPADM

## 2024-02-23 RX ORDER — BISACODYL 5 MG
10 TABLET, DELAYED RELEASE (ENTERIC COATED) ORAL DAILY PRN
Status: DISCONTINUED | OUTPATIENT
Start: 2024-02-23 | End: 2024-02-26 | Stop reason: HOSPADM

## 2024-02-23 RX ORDER — TALC
3 POWDER (GRAM) TOPICAL DAILY
Status: DISCONTINUED | OUTPATIENT
Start: 2024-02-23 | End: 2024-02-26 | Stop reason: HOSPADM

## 2024-02-23 RX ORDER — ASPIRIN 81 MG/1
81 TABLET ORAL DAILY
Status: DISCONTINUED | OUTPATIENT
Start: 2024-02-23 | End: 2024-02-26 | Stop reason: HOSPADM

## 2024-02-23 RX ORDER — LANOLIN ALCOHOL/MO/W.PET/CERES
1000 CREAM (GRAM) TOPICAL DAILY
Status: DISCONTINUED | OUTPATIENT
Start: 2024-02-23 | End: 2024-02-25

## 2024-02-23 RX ORDER — DEXTROSE MONOHYDRATE 100 MG/ML
0.3 INJECTION, SOLUTION INTRAVENOUS ONCE AS NEEDED
Status: DISCONTINUED | OUTPATIENT
Start: 2024-02-23 | End: 2024-02-26 | Stop reason: HOSPADM

## 2024-02-23 RX ORDER — CYANOCOBALAMIN 1000 UG/ML
1000 INJECTION, SOLUTION INTRAMUSCULAR; SUBCUTANEOUS DAILY
Status: DISCONTINUED | OUTPATIENT
Start: 2024-02-24 | End: 2024-02-26 | Stop reason: HOSPADM

## 2024-02-23 RX ORDER — ENOXAPARIN SODIUM 100 MG/ML
40 INJECTION SUBCUTANEOUS EVERY 12 HOURS SCHEDULED
Status: DISCONTINUED | OUTPATIENT
Start: 2024-02-23 | End: 2024-02-26 | Stop reason: HOSPADM

## 2024-02-23 RX ORDER — INSULIN LISPRO 100 [IU]/ML
0-5 INJECTION, SOLUTION INTRAVENOUS; SUBCUTANEOUS
Status: DISCONTINUED | OUTPATIENT
Start: 2024-02-23 | End: 2024-02-26 | Stop reason: HOSPADM

## 2024-02-23 RX ORDER — DEXTROSE 50 % IN WATER (D50W) INTRAVENOUS SYRINGE
25
Status: DISCONTINUED | OUTPATIENT
Start: 2024-02-23 | End: 2024-02-26 | Stop reason: HOSPADM

## 2024-02-23 RX ORDER — GUAIFENESIN 600 MG/1
600 TABLET, EXTENDED RELEASE ORAL EVERY 12 HOURS PRN
Status: DISCONTINUED | OUTPATIENT
Start: 2024-02-23 | End: 2024-02-26 | Stop reason: HOSPADM

## 2024-02-23 RX ORDER — PANTOPRAZOLE SODIUM 40 MG/1
40 TABLET, DELAYED RELEASE ORAL
Status: DISCONTINUED | OUTPATIENT
Start: 2024-02-24 | End: 2024-02-26 | Stop reason: HOSPADM

## 2024-02-23 RX ORDER — ONDANSETRON 4 MG/1
4 TABLET, FILM COATED ORAL EVERY 8 HOURS PRN
Status: DISCONTINUED | OUTPATIENT
Start: 2024-02-23 | End: 2024-02-26 | Stop reason: HOSPADM

## 2024-02-23 RX ORDER — ATORVASTATIN CALCIUM 40 MG/1
80 TABLET, FILM COATED ORAL NIGHTLY
Status: DISCONTINUED | OUTPATIENT
Start: 2024-02-23 | End: 2024-02-26 | Stop reason: HOSPADM

## 2024-02-23 RX ORDER — ACETAMINOPHEN 325 MG/1
650 TABLET ORAL EVERY 8 HOURS PRN
Status: DISCONTINUED | OUTPATIENT
Start: 2024-02-23 | End: 2024-02-26 | Stop reason: HOSPADM

## 2024-02-23 RX ORDER — ONDANSETRON HYDROCHLORIDE 2 MG/ML
4 INJECTION, SOLUTION INTRAVENOUS EVERY 8 HOURS PRN
Status: DISCONTINUED | OUTPATIENT
Start: 2024-02-23 | End: 2024-02-26 | Stop reason: HOSPADM

## 2024-02-23 RX ORDER — FUROSEMIDE 20 MG/1
20 TABLET ORAL DAILY
Status: DISCONTINUED | OUTPATIENT
Start: 2024-02-23 | End: 2024-02-26 | Stop reason: HOSPADM

## 2024-02-23 RX ADMIN — ATORVASTATIN CALCIUM 80 MG: 40 TABLET, FILM COATED ORAL at 20:41

## 2024-02-23 RX ADMIN — CARVEDILOL 12.5 MG: 12.5 TABLET, FILM COATED ORAL at 17:31

## 2024-02-23 RX ADMIN — Medication 3 MG: at 20:41

## 2024-02-23 SDOH — SOCIAL STABILITY: SOCIAL INSECURITY: HAVE YOU HAD THOUGHTS OF HARMING ANYONE ELSE?: NO

## 2024-02-23 SDOH — SOCIAL STABILITY: SOCIAL INSECURITY: WERE YOU ABLE TO COMPLETE ALL THE BEHAVIORAL HEALTH SCREENINGS?: YES

## 2024-02-23 ASSESSMENT — ENCOUNTER SYMPTOMS
RHINORRHEA: 0
FEVER: 0
CONFUSION: 1
DIAPHORESIS: 0
FLANK PAIN: 0
SEIZURES: 0
AGITATION: 0
FREQUENCY: 0
COUGH: 0
CHEST TIGHTNESS: 0
DYSURIA: 0
DECREASED CONCENTRATION: 0
HYPERACTIVE: 0
WOUND: 0
APNEA: 0
PALPITATIONS: 0
CHILLS: 0
NERVOUS/ANXIOUS: 0
LIGHT-HEADEDNESS: 0
WEAKNESS: 1
VOMITING: 0
WHEEZING: 0
CONSTIPATION: 0
HALLUCINATIONS: 0
MYALGIAS: 0
NAUSEA: 0
SHORTNESS OF BREATH: 0
DIZZINESS: 0
HEADACHES: 0
ABDOMINAL DISTENTION: 0
SINUS PAIN: 0
NECK STIFFNESS: 0
SORE THROAT: 0
DIARRHEA: 0
JOINT SWELLING: 0
NECK PAIN: 0
ARTHRALGIAS: 0
ACTIVITY CHANGE: 1
BACK PAIN: 0
HEMATURIA: 0
STRIDOR: 0
DIFFICULTY URINATING: 0
FATIGUE: 0
NUMBNESS: 0
FACIAL ASYMMETRY: 0
ABDOMINAL PAIN: 0

## 2024-02-23 ASSESSMENT — COGNITIVE AND FUNCTIONAL STATUS - GENERAL
PATIENT BASELINE BEDBOUND: NO
DAILY ACTIVITIY SCORE: 16
DRESSING REGULAR LOWER BODY CLOTHING: A LOT
PERSONAL GROOMING: A LITTLE
MOBILITY SCORE: 12
TOILETING: A LITTLE
WALKING IN HOSPITAL ROOM: A LITTLE
WALKING IN HOSPITAL ROOM: A LOT
PERSONAL GROOMING: A LITTLE
DAILY ACTIVITIY SCORE: 15
MOBILITY SCORE: 18
TURNING FROM BACK TO SIDE WHILE IN FLAT BAD: A LOT
STANDING UP FROM CHAIR USING ARMS: A LOT
MOVING TO AND FROM BED TO CHAIR: A LOT
CLIMB 3 TO 5 STEPS WITH RAILING: TOTAL
DRESSING REGULAR UPPER BODY CLOTHING: A LITTLE
MOBILITY SCORE: 12
DRESSING REGULAR UPPER BODY CLOTHING: A LITTLE
MOVING FROM LYING ON BACK TO SITTING ON SIDE OF FLAT BED WITH BEDRAILS: A LITTLE
TOILETING: A LOT
HELP NEEDED FOR BATHING: A LOT
TURNING FROM BACK TO SIDE WHILE IN FLAT BAD: A LOT
STANDING UP FROM CHAIR USING ARMS: A LOT
DRESSING REGULAR LOWER BODY CLOTHING: A LOT
STANDING UP FROM CHAIR USING ARMS: A LOT
EATING MEALS: A LITTLE
DRESSING REGULAR UPPER BODY CLOTHING: A LITTLE
DRESSING REGULAR LOWER BODY CLOTHING: A LOT
MOVING FROM LYING ON BACK TO SITTING ON SIDE OF FLAT BED WITH BEDRAILS: A LITTLE
TOILETING: A LOT
MOVING TO AND FROM BED TO CHAIR: A LOT
DAILY ACTIVITIY SCORE: 16
MOVING TO AND FROM BED TO CHAIR: A LOT
DAILY ACTIVITIY SCORE: 19
HELP NEEDED FOR BATHING: A LOT
CLIMB 3 TO 5 STEPS WITH RAILING: TOTAL
WALKING IN HOSPITAL ROOM: A LOT
MOVING FROM LYING ON BACK TO SITTING ON SIDE OF FLAT BED WITH BEDRAILS: A LITTLE
PERSONAL GROOMING: A LITTLE
DRESSING REGULAR LOWER BODY CLOTHING: A LITTLE
TURNING FROM BACK TO SIDE WHILE IN FLAT BAD: A LITTLE
WALKING IN HOSPITAL ROOM: A LOT
MOVING TO AND FROM BED TO CHAIR: A LITTLE
TURNING FROM BACK TO SIDE WHILE IN FLAT BAD: A LOT
PERSONAL GROOMING: A LITTLE
DRESSING REGULAR UPPER BODY CLOTHING: A LITTLE
STANDING UP FROM CHAIR USING ARMS: A LITTLE
HELP NEEDED FOR BATHING: A LITTLE
HELP NEEDED FOR BATHING: A LOT
TOILETING: A LOT
MOVING FROM LYING ON BACK TO SITTING ON SIDE OF FLAT BED WITH BEDRAILS: A LITTLE
CLIMB 3 TO 5 STEPS WITH RAILING: TOTAL
CLIMB 3 TO 5 STEPS WITH RAILING: A LITTLE
MOBILITY SCORE: 12

## 2024-02-23 ASSESSMENT — PAIN SCALES - GENERAL
PAINLEVEL_OUTOF10: 0 - NO PAIN

## 2024-02-23 ASSESSMENT — LIFESTYLE VARIABLES
AUDIT-C TOTAL SCORE: 0
HAVE PEOPLE ANNOYED YOU BY CRITICIZING YOUR DRINKING: NO
HOW OFTEN DO YOU HAVE 6 OR MORE DRINKS ON ONE OCCASION: NEVER
HAVE YOU EVER FELT YOU SHOULD CUT DOWN ON YOUR DRINKING: NO
SKIP TO QUESTIONS 9-10: 1
PRESCIPTION_ABUSE_PAST_12_MONTHS: NO
AUDIT-C TOTAL SCORE: 0
EVER FELT BAD OR GUILTY ABOUT YOUR DRINKING: NO
HOW OFTEN DO YOU HAVE A DRINK CONTAINING ALCOHOL: NEVER
EVER HAD A DRINK FIRST THING IN THE MORNING TO STEADY YOUR NERVES TO GET RID OF A HANGOVER: NO
HOW MANY STANDARD DRINKS CONTAINING ALCOHOL DO YOU HAVE ON A TYPICAL DAY: PATIENT DOES NOT DRINK
SUBSTANCE_ABUSE_PAST_12_MONTHS: NO

## 2024-02-23 ASSESSMENT — ACTIVITIES OF DAILY LIVING (ADL)
HEARING - RIGHT EAR: FUNCTIONAL
HEARING - RIGHT EAR: FUNCTIONAL
GROOMING: NEEDS ASSISTANCE
HEARING - LEFT EAR: FUNCTIONAL
ASSISTIVE_DEVICE: WALKER
GROOMING: NEEDS ASSISTANCE
LACK_OF_TRANSPORTATION: NO
LACK_OF_TRANSPORTATION: NO
FEEDING YOURSELF: INDEPENDENT
PATIENT'S MEMORY ADEQUATE TO SAFELY COMPLETE DAILY ACTIVITIES?: YES
PATIENT'S MEMORY ADEQUATE TO SAFELY COMPLETE DAILY ACTIVITIES?: YES
ADEQUATE_TO_COMPLETE_ADL: YES
ASSISTIVE_DEVICE: WALKER
TOILETING: NEEDS ASSISTANCE
JUDGMENT_ADEQUATE_SAFELY_COMPLETE_DAILY_ACTIVITIES: YES
JUDGMENT_ADEQUATE_SAFELY_COMPLETE_DAILY_ACTIVITIES: YES
BATHING: NEEDS ASSISTANCE
DRESSING YOURSELF: NEEDS ASSISTANCE
ADEQUATE_TO_COMPLETE_ADL: YES
DRESSING YOURSELF: NEEDS ASSISTANCE
WALKS IN HOME: NEEDS ASSISTANCE
WALKS IN HOME: NEEDS ASSISTANCE
FEEDING YOURSELF: INDEPENDENT
TOILETING: NEEDS ASSISTANCE
HEARING - LEFT EAR: FUNCTIONAL

## 2024-02-23 ASSESSMENT — PATIENT HEALTH QUESTIONNAIRE - PHQ9
2. FEELING DOWN, DEPRESSED OR HOPELESS: NOT AT ALL
SUM OF ALL RESPONSES TO PHQ9 QUESTIONS 1 & 2: 0
1. LITTLE INTEREST OR PLEASURE IN DOING THINGS: NOT AT ALL

## 2024-02-23 ASSESSMENT — PAIN - FUNCTIONAL ASSESSMENT
PAIN_FUNCTIONAL_ASSESSMENT: 0-10

## 2024-02-23 ASSESSMENT — COLUMBIA-SUICIDE SEVERITY RATING SCALE - C-SSRS
2. HAVE YOU ACTUALLY HAD ANY THOUGHTS OF KILLING YOURSELF?: NO
6. HAVE YOU EVER DONE ANYTHING, STARTED TO DO ANYTHING, OR PREPARED TO DO ANYTHING TO END YOUR LIFE?: NO
1. IN THE PAST MONTH, HAVE YOU WISHED YOU WERE DEAD OR WISHED YOU COULD GO TO SLEEP AND NOT WAKE UP?: NO

## 2024-02-23 NOTE — CONSULTS
"History Of Present Illness  Andree Johnson is a 88 y.o. female left handed admitted to [facility] on 2/23/24 presenting with left sided weakness and unresponsivenses. Neurology consulted for above.    Inpatient consult to Neurology  Consult performed by: Brandon Godinez MD  Consult ordered by: Devon Taylor DO      History history of hypertension, diabetes, CAD status post angioplasty, glaucoma.    Patient seen this afternoon in regular nursing floor, no family members around.  Patient was brought in from rehab facility about noontime today because of possible stroke.  There seems to be some discrepancy between the history of the ER as well as the hospitalist.  Last known well 11 AM today per ER note.  I called staff at [facility] in [place] and spoke to her nurse there.  Patient apparently normal this morning, and was given her usual morning medications.  She then went down to the gym for \"therapy\".  It was there that it was reported that patient became \"flaccid\", was unresponsive, and had left-sided weakness.  Their nurse practitioner happened to be onsite at that time, who assessed her, and confirm the left-sided weakness.  Because of this, EMS was called.  By the time she left the facility to come to the ER, she was apparently slowly coming back to.  At [facility] ED, patient apparently already back to baseline.  Initial vital signs documented blood pressure 105/70, heart rate 60, afebrile.  NIH stroke scale score was 0, VAN negative.  Head CT without contrast done did not show any acute findings.  Concern for LVO was deemed low, and patient was deemed not to be IV thrombolytic candidate.  Patient admitted to the hospital.    Of note, patient already on aspirin and clopidogrel and atorvastatin as per home meds.    Patient recently admitted at [facility] earlier in February 2024 for falls.  She was found to have B12 deficiency.  After a few days, she was discharged to the facility.  According to her " "nurse at the facility, patient normally goes around on a wheelchair, but is able to stand with 1 assist.    Patient denies normally having memory issues.    When asked, patient remembers doing therapy, she remembers being seated, working with a \"long metal bar\" but sounds like she was lifting up and then in front of her, and next thing she knew, the staff were calling EMS.  That is all that she remembers.  The facility nurse denies seeing any seizure-like activity.  Patient apparently does not have any known history of seizure.  When asked, patient \"thinks\" she fell, but she was not sure.  She is not sure whether she had loss of consciousness.    Patient denies any history of smoking, alcohol use, or street drug use.      Review of Systems   Constitutional:  Negative for appetite change, chills and fever.   HENT:  Negative for ear pain and nosebleeds.    Eyes:  Negative for discharge and itching.   Respiratory:  Negative for choking and chest tightness.    Cardiovascular:  Negative for chest pain and palpitations.   Gastrointestinal:  Negative for abdominal distention, abdominal pain and nausea.   Endocrine: Negative for cold intolerance and heat intolerance.   Genitourinary:  Negative for difficulty urinating and dysuria.   Musculoskeletal:  Negative for gait problem and myalgias.   Neurological:  Negative for dizziness, seizures and numbness.     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, mild stage    Hypertension     Personal history of malignant neoplasm of breast 12/04/2018    History " of malignant neoplasm of breast    Personal history of other diseases of the digestive system 12/04/2018    History of constipation    Personal history of other diseases of the musculoskeletal system and connective tissue     History of arthritis    Personal history of other diseases of the musculoskeletal system and connective tissue 12/04/2018    History of chronic back pain    Personal history of other diseases of the nervous system and sense organs 12/04/2018    History of glaucoma    Personal history of other specified conditions     History of dizziness       Surgical History  Past Surgical History:   Procedure Laterality Date    OTHER SURGICAL HISTORY  12/04/2018    Hysterectomy    OTHER SURGICAL HISTORY  12/04/2018    Breast biopsy    OTHER SURGICAL HISTORY  12/04/2018    Colonoscopy    OTHER SURGICAL HISTORY  12/04/2018    Laparoscopy    OTHER SURGICAL HISTORY  12/04/2018    Hernia repair    OTHER SURGICAL HISTORY  12/04/2018    Cataract surgery    OTHER SURGICAL HISTORY  12/04/2018    Cholecystectomy    OTHER SURGICAL HISTORY  12/04/2018    Mastectomy       Social History  Social History     Tobacco Use    Smoking status: Never    Smokeless tobacco: Never   Vaping Use    Vaping Use: Never used   Substance Use Topics    Alcohol use: Never    Drug use: Never       Home Meds  Medications Prior to Admission   Medication Sig Dispense Refill Last Dose    acetaminophen (Tylenol) 325 mg tablet Take 2 tablets (650 mg) by mouth every 8 hours if needed for moderate pain (4 - 6). OTC       aspirin 81 mg EC tablet Take 1 tablet (81 mg) by mouth once daily. OTC       atorvastatin (Lipitor) 80 mg tablet Take 1 tablet (80 mg) by mouth once daily at bedtime. Dr Gill       blood sugar diagnostic (OneTouch Ultra Test) strip 1 each by Not Applicable route once daily. Which ever brand preferred by patient and insurance coverage 100 strip 3     carvedilol (Coreg) 12.5 mg tablet Take 1 tablet (12.5 mg) by mouth 2 times a day  with meals. Dr Gill       cholecalciferol (Vitamin D-3) 125 MCG (5000 UT) capsule Take by mouth once daily. OTC       clopidogrel (Plavix) 75 mg tablet Take 1 tablet (75 mg) by mouth once daily. Dr Gill       cyanocobalamin (Vitamin B-12) 1,000 mcg tablet Take 1 tablet (1,000 mcg) by mouth once daily. Do not start before February 1, 2024. 30 tablet 1     furosemide (Lasix) 20 mg tablet Take 1 tablet (20 mg) by mouth once daily. 90 tablet 1     glimepiride (Amaryl) 4 mg tablet Take 1 tablet (4 mg) by mouth 2 times a day. Dr Gray 180 tablet 0     linaGLIPtin (Tradjenta) 5 mg tablet Take 1 tablet (5 mg) by mouth once daily. Dr Gray 90 tablet 0     lisinopril 20 mg tablet Take 1 tablet (20 mg) by mouth 2 times a day. Dr Gill       ondansetron ODT (Zofran-ODT) 4 mg disintegrating tablet Take 1 tablet (4 mg) by mouth every 8 hours if needed for nausea or vomiting. 20 tablet 0     pioglitazone (Actos) 30 mg tablet Take 1 tablet (30 mg) by mouth once daily. 90 tablet 0     polyethylene glycol (Glycolax) 17 gram/dose powder Take by mouth. Dr Gill          Allergies  Adhesive tape-silicones, Metformin, and Shellfish containing products    Current Meds  Scheduled medications  aspirin, 81 mg, oral, Daily  atorvastatin, 80 mg, oral, Nightly  carvedilol, 12.5 mg, oral, BID with meals  clopidogrel, 75 mg, oral, Daily  enoxaparin, 40 mg, subcutaneous, q12h [place]  furosemide, 20 mg, oral, Daily  insulin lispro, 0-5 Units, subcutaneous, TID with meals  lisinopril, 20 mg, oral, BID  melatonin, 3 mg, oral, Daily  [START ON 2/24/2024] pantoprazole, 40 mg, oral, Daily before breakfast   Or  [START ON 2/24/2024] pantoprazole, 40 mg, intravenous, Daily before breakfast      Continuous medications     PRN medications  PRN medications: acetaminophen, bisacodyl, bisacodyl, dextrose 10 % in water (D10W), dextrose, glucagon, guaiFENesin, ondansetron **OR** ondansetron    Last Recorded Vitals  Blood pressure 163/74, pulse 60, temperature  "36.3 °C (97.3 °F), resp. rate 13, height 1.575 m (5' 2\"), weight 108 kg (239 lb), SpO2 98 %.    Awake, alert, oriented, in no distress  Patient appears to be forgetful as to what the events were this morning  Patient able to answer simple questions and follow simple commands  Well-nourished, in bed   No leg edema    Supple neck    Patient does have a yellowish purplish bruise at or around her left kneecap--patient states this was from fall today in the facility    Mental status exam as above, conversant   Fund of knowledge fair  Recent/remote memory unable to formally assess  Fair attention span  Pupils round reactive to light, 3-4 mm, (-) RAPD   Fundoscopic examination was attempted but fundus was not visualized bilaterally   Full EOMs intact, no nystagmus, no ptosis   V1 to V3 sensation is intact   No facial droop   Hearing grossly intact   No dysarthria  Good shoulder shrug bilaterally   Tongue is midline     Motor strength is at least antigravity on all extremities, tone/bulk normal   Reflexes 1+ on BUE, trace to absent on BLE (obese), downgoing toes bilaterally   Sensation is intact to light touch, vibration on all 4 extremities, except absent vibratory sensation on BLE up to the knees  Finger to nose test intact bilaterally   Unable to have her stand or walk    Relevant Results  I have personally reviewed the following:    Labs  Results for orders placed or performed during the hospital encounter of 02/23/24 (from the past 24 hour(s))   POCT GLUCOSE   Result Value Ref Range    POCT Glucose 196 (H) 74 - 99 mg/dL   CBC and Auto Differential   Result Value Ref Range    WBC 6.0 4.4 - 11.3 x10*3/uL    nRBC 0.0 0.0 - 0.0 /100 WBCs    RBC 3.72 (L) 4.00 - 5.20 x10*6/uL    Hemoglobin 11.7 (L) 12.0 - 16.0 g/dL    Hematocrit 36.9 36.0 - 46.0 %    MCV 99 80 - 100 fL    MCH 31.5 26.0 - 34.0 pg    MCHC 31.7 (L) 32.0 - 36.0 g/dL    RDW 14.1 11.5 - 14.5 %    Platelets 159 150 - 450 x10*3/uL    Neutrophils % 62.7 40.0 - 80.0 % "    Immature Granulocytes %, Automated 0.5 0.0 - 0.9 %    Lymphocytes % 23.2 13.0 - 44.0 %    Monocytes % 8.8 2.0 - 10.0 %    Eosinophils % 4.3 0.0 - 6.0 %    Basophils % 0.5 0.0 - 2.0 %    Neutrophils Absolute 3.78 1.60 - 5.50 x10*3/uL    Immature Granulocytes Absolute, Automated 0.03 0.00 - 0.50 x10*3/uL    Lymphocytes Absolute 1.40 0.80 - 3.00 x10*3/uL    Monocytes Absolute 0.53 0.05 - 0.80 x10*3/uL    Eosinophils Absolute 0.26 0.00 - 0.40 x10*3/uL    Basophils Absolute 0.03 0.00 - 0.10 x10*3/uL   Troponin I, High Sensitivity   Result Value Ref Range    Troponin I, High Sensitivity 9 0 - 13 ng/L   Protime-INR   Result Value Ref Range    Protime 12.7 9.8 - 12.8 seconds    INR 1.1 0.9 - 1.1   APTT   Result Value Ref Range    aPTT 27 27 - 38 seconds   Basic metabolic panel   Result Value Ref Range    Glucose 216 (H) 74 - 99 mg/dL    Sodium 136 136 - 145 mmol/L    Potassium 4.5 3.5 - 5.3 mmol/L    Chloride 103 98 - 107 mmol/L    Bicarbonate 28 21 - 32 mmol/L    Anion Gap 10 10 - 20 mmol/L    Urea Nitrogen 26 (H) 6 - 23 mg/dL    Creatinine 0.90 0.50 - 1.05 mg/dL    eGFR 62 >60 mL/min/1.73m*2    Calcium 8.4 (L) 8.6 - 10.3 mg/dL   Hepatic function panel   Result Value Ref Range    Albumin 3.0 (L) 3.4 - 5.0 g/dL    Bilirubin, Total 1.3 (H) 0.0 - 1.2 mg/dL    Bilirubin, Direct 0.2 0.0 - 0.3 mg/dL    Alkaline Phosphatase 70 33 - 136 U/L    ALT 14 7 - 45 U/L    AST 18 9 - 39 U/L    Total Protein 5.5 (L) 6.4 - 8.2 g/dL   Vitamin B12   Result Value Ref Range    Vitamin B12 248 211 - 911 pg/mL   Urinalysis with Reflex Culture and Microscopic   Result Value Ref Range    Color, Urine Yellow Straw, Yellow    Appearance, Urine Clear Clear    Specific Gravity, Urine 1.009 1.005 - 1.035    pH, Urine 7.0 5.0, 5.5, 6.0, 6.5, 7.0, 7.5, 8.0    Protein, Urine NEGATIVE NEGATIVE mg/dL    Glucose, Urine NEGATIVE NEGATIVE mg/dL    Blood, Urine NEGATIVE NEGATIVE    Ketones, Urine NEGATIVE NEGATIVE mg/dL    Bilirubin, Urine NEGATIVE NEGATIVE     Urobilinogen, Urine <2.0 <2.0 mg/dL    Nitrite, Urine NEGATIVE NEGATIVE    Leukocyte Esterase, Urine NEGATIVE NEGATIVE       Imaging results  MR brain wo IV contrast    Result Date: 2/23/2024  Interpreted By:  Joel Pappas and O'Connor Gregory STUDY: MR BRAIN WO IV CONTRAST;  2/23/2024 4:26 pm   INDICATION: Signs/Symptoms:tia.   COMPARISON: Same day CT head at 12:37 p.m. MRI brain dated 11/15/2018   ACCESSION NUMBER(S): [id number]   ORDERING CLINICIAN: MISTY MARRUFO   TECHNIQUE: Axial T2, FLAIR, DWI, gradient echo T2 and sagittal and coronal T1 weighted images of brain were acquired.   FINDINGS: CSF Spaces: Mild prominence of the ventricles and sulci reflecting a component of generalized parenchymal volume loss. The basal cisterns are patent. No abnormal extra-axial fluid collection.   Parenchyma: There is no diffusion restriction abnormality to suggest acute infarct.  Scattered T2 and FLAIR hyperintensities in the subcortical and periventricular white matter, nonspecific however can be seen with chronic small vessel ischemic change. There is no mass effect or midline shift.   Paranasal Sinuses and Mastoids: Visualized paranasal sinuses and mastoid air cells are unremarkable.   Incidental note is made of a 0.9 cm T2 hyperintense and T1 hypointense lesion in the left parotid gland, slightly increased in size from 01/15/2018 where it measured up to 0.6 cm.       1. No evidence of acute cortical infarction, mass, or intracranial hemorrhage. 2. Incidental note is made of a 0.9 cm cystic lesion in the left parotid gland which is incompletely characterized and has slightly increased in size from 11/15/2018. This could be better assessed with a dedicated CT or MRI of the neck with contrast.   I personally reviewed the images/study and I agree with the findings as stated by resident physician Dr. Ted Arteaga.   MACRO: None   Signed by: Joel Pappas 2/23/2024 4:47 PM Dictation workstation:   [id number]    CT  head wo IV contrast    Result Date: 1/26/2024  Interpreted By:  Valerio Arambula, STUDY: CT HEAD WO IV CONTRAST; CT CERVICAL SPINE WO IV CONTRAST;  1/26/2024 10:16 pm   INDICATION: Signs/Symptoms:fall   COMPARISON: None.   ACCESSION NUMBER(S): [id number]; [id number]   ORDERING CLINICIAN: LINDEN RUTH   TECHNIQUE: Axial noncontrast CT images of head with coronal and sagittal reconstructed images. Axial noncontrast CT images of the cervical spine with coronal and sagittal reconstructed images.   FINDINGS: CT HEAD:   BRAIN PARENCHYMA:  No evidence of acute intraparenchymal hemorrhage or parenchymal evidence of acute large territory ischemic infarct. No mass-effect, midline shift or effacement of cerebral sulci. Gray-white matter distinction is preserved. Global volume loss and senescent changes again seen.     VENTRICLES and EXTRA-AXIAL SPACES:  No acute extra-axial or intraventricular hemorrhage. Ventricles and sulci are age-concordant.   PARANASAL SINUSES/MASTOIDS:  No hemorrhage or air-fluid levels within the visualized paranasal sinuses. The mastoid air cells are well-aerated.   CALVARIUM/ORBITS:  Demineralization. Vascular calcification.       CT CERVICAL SPINE: Demineralization   PREVERTEBRAL SOFT TISSUES: Within normal limits.   CRANIOCERVICAL JUNCTION: Intact.   ALIGNMENT:  No traumatic malalignment or traumatic facet widening.   VERTEBRAE:  No acute fracture. Vertebral body heights are maintained.   Mild degenerative changes seen without evidence of acute cervical spine fracture.   Vascular calcifications are seen       CT HEAD: Senescent changes. No findings of acute intracranial abnormality     CT CERVICAL SPINE: No findings of acute cervical spine fracture. Demineralization and mild degenerative changes.   Signed by: Valerio Arambula 1/26/2024 11:06 PM Dictation workstation:   [id number]      Assessment/Plan  1.  Alteration of consciousness  2.  Left-sided weakness  Exam now non-focal--though pt doesn't  remember what happened and ? With psychomotor slowing (vs this is her baseline)  No reported seizure-like activity  DDx: Stroke versus TIA, syncopal episode, less likely seizure  MRI brain with no acute stroke, there are some nonspecific subcortical white matter disease bilaterally  There is no peripheral brain finding in the head imaging to explain what happened to her this morning  Patient already on aspirin/clopidogrel/statin  I do not have recent cerebrovascular imaging--cerebrovascular imaging done 2018 seems to indicate insignificant findings    3.  Left parotid mass  Incidental finding on MRI  Defer to primary team    4.  History of B12 deficiency  Repeat today is 248 despite oral B12 supplementation    5.  Diabetes    Cardiac work-up for poss syncope, otherwise treat as poss TIA      Recommendations:  Continue ASA 81mg daily (with food) and clopidogrel 75 mg daily  Continue atorvastatin   Check LDL/Hba1c  Do MRA brain wo/MRA neck wo to exclude hemodynamically significant stenosis or occlusion that may have contributed/caused lateralizing sign in setting of syncope and transient cerebral hypoperfusion  5.   Cardiac work-up for poss syncope--defer to primary team  6.   Allow permissive hypertension for 48-72 hours after stroke-symptom onset  7.   Control vascular risk factors  8.   Check orthostatic VS (at least laying/sitting if able)  9.   Continue B12 supplementation c/o primary team--? consider IM B12?  10. PT eval    Discussed with pt. Message sent to Dr Taylor.    All questions answered. Please call with questions.    Brandon Godinez MD  2/23/2024  5:15 PM

## 2024-02-23 NOTE — ED PROVIDER NOTES
HPI   Chief Complaint   Patient presents with   • Stroke       HPI: []  88-year-old white female history of hypertension, diabetes, CAD, angioplasty, glaucoma currently on aspirin Plavix comes in with a possible stroke.  Last well-known 11 AM.  Patient in rehab.  Unresponsive with left-sided weakness 11 AM EMS called when they got there patient back to baseline.  Patient denies any chest pain pressure heaviness fever chills nausea vomit diarrhea cough congestion incontinence seizures syncope or near syncope no hematemesis melena medic easier hemoptysis.  Patient recently hospitalized.    Past history: Hypertension, diabetes, lipidemia, CAD, morbid obesity, high BMI    Social: Patient denies current tobacco alcohol drug abuse.  REVIEW OF SYSTEMS:    GENERAL.: No weight loss, fatigue, anorexia, insomnia, fever.    EYES: No vision loss, double vision, drainage, eye pain.    ENT: No pharyngitis, dry mouth.    CARDIOPULMONARY: No chest pain, palpitations, syncope, near syncope. No shortness of breath, cough, hemoptysis.    GI: No abdominal pain, change in bowel habits, melena, hematemesis, hematochezia, nausea, vomiting, diarrhea.    : No discharge, dysuria, frequency, urgency, hematuria.    MS: No limb pain, joint pain, joint swelling.  Neuro: Positive strokelike symptoms  SKIN: No rashes.    PSYCH: No depression, anxiety, suicidality, homicidality.    Review of systems is otherwise negative unless stated above or in history of present illness.  Social history, family history, allergies reviewed.  PHYSICAL EXAM:    GENERAL: Vitals noted, no distress. Alert and oriented  x 3. Non-toxic.  High BMI  Eyes: Pupils equal round react light accommodation EOMs are intact  EENT: TMs clear. Posterior oropharynx unremarkable. No meningismus. No LAD.     NECK: Supple. Nontender. No midline tenderness.     CARDIAC: Regular, rate, rhythm. No murmurs rubs or gallops. No JVD    PULMONARY: Lungs clear bilaterally with good aeration. No  wheezes rales or rhonchi. No respiratory distress.     ABDOMEN: Soft, nonsurgical. Nontender. No peritoneal signs. Normoactive bowel sounds. No pulsatile masses.     EXTREMITIES: 2+ bilateral symmetric nonpitting peripheral edema. Negative Homans bilaterally, no cords.  2+ bounding pulses well-perfused.    SKIN: No rash. Intact.     NEURO: No focal neurologic deficits, NIH score of 0. Cranial nerves normal as tested from II through XII.     MEDICAL DECISION MAKING:  EKG on my interpretation shows a normal sinus rhythm left axis deviation left bundle branch block rate mid 60s with no ischemic changes.    CBC with differential chemistry LFTs unremarkable CT head is negative.    Treatment ED: Upon arrival IV established patient NIH stroke scale 0 Van negative we called stroke alert CT head negative low concern for LVO, patient remained stable hemodynamic.  Stroke scale remains 0    Impression: #1 TIA    Plan set MDM: 88-year-old female history of hypertension diabetes high BMI dyslipidemia CAD comes in with an episode of left-sided weakness currently back to baseline stroke scale 0 history and examination is concerning for TIA low concern for LVO patient back to baseline neurologic status and given the fact she is back to her normal stroke scale of 0 not a candidate for TNK thrombolysis or thrombectomy, patient will be hospitalized for further care.                                  Andrew Coma Scale Score: 15         NIH Stroke Scale: 0             Patient History   Past Medical History:   Diagnosis Date   • Abnormal findings on diagnostic imaging of heart and coronary circulation 12/04/2018    Abnormal echocardiogram   • CHF (congestive heart failure) (CMS/[facility])    • Coronary angioplasty status 12/04/2018    S/P PTCA (percutaneous transluminal coronary angioplasty)   • Diabetes mellitus (CMS/[facility])    • Glaucoma secondary to eye trauma, right eye, mild stage 12/04/2018    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,  mild stage   • Hypertension    • Personal history of malignant neoplasm of breast 12/04/2018    History of malignant neoplasm of breast   • Personal history of other diseases of the digestive system 12/04/2018    History of constipation   • Personal history of other diseases of the musculoskeletal system and connective tissue     History of arthritis   • Personal history of other diseases of the musculoskeletal system and connective tissue 12/04/2018    History of chronic back pain   • Personal history of other diseases of the nervous system and sense organs 12/04/2018    History of glaucoma   • Personal history of other specified conditions     History of dizziness     Past Surgical History:   Procedure Laterality Date   • OTHER SURGICAL HISTORY  12/04/2018    Hysterectomy   • OTHER SURGICAL HISTORY  12/04/2018    Breast biopsy   • OTHER SURGICAL HISTORY  12/04/2018    Colonoscopy   • OTHER SURGICAL HISTORY  12/04/2018    Laparoscopy   • OTHER SURGICAL HISTORY  12/04/2018    Hernia repair   • OTHER SURGICAL HISTORY  12/04/2018    Cataract surgery   • OTHER SURGICAL HISTORY  12/04/2018    Cholecystectomy   • OTHER SURGICAL HISTORY  12/04/2018    Mastectomy     Family History   Problem Relation Name Age of Onset   • Hypertension Mother     • Breast cancer Neg Hx       Social History     Tobacco Use   • Smoking status: Never   • Smokeless tobacco: Never   Vaping Use   • Vaping Use: Never used   Substance Use Topics   • Alcohol use: Never   • Drug use: Never       Physical Exam   ED Triage Vitals   Temperature Heart Rate Respirations BP   02/23/24 1222 02/23/24 1222 02/23/24 1222 02/23/24 1230   36.6 °C (97.8 °F) 60 18 105/70      Pulse Ox Temp Source Heart Rate Source Patient Position   02/23/24 1222 02/23/24 1222 -- --   98 % Tympanic        BP Location FiO2 (%)     -- --             Physical Exam    ED Course & MDM   ED Course as of 03/03/24 1534   Fri Feb 23, 2024   1518 Upon arrival patient's NIH stroke scale is  0, Van negative because of stroke alert CT head is negative labs reviewed reassuring patient probably had a TIA already on aspirin Plavix will be hospitalized for further care. [MT]      ED Course User Index  [MT] Abby Mattson MD         Diagnoses as of 03/03/24 1534   TIA (transient ischemic attack) - no acute stroke, risk modifcations, wait for placement for rehab       Medical Decision Making      Procedure  Critical Care    Performed by: Abby Mattson MD  Authorized by: Abby Mattson MD    Critical care provider statement:     Critical care time (minutes):  35    Critical care time was exclusive of:  Separately billable procedures and treating other patients    Critical care was necessary to treat or prevent imminent or life-threatening deterioration of the following conditions:  CNS failure or compromise    Critical care was time spent personally by me on the following activities:  Blood draw for specimens, evaluation of patient's response to treatment, examination of patient, review of old charts, re-evaluation of patient's condition, pulse oximetry, ordering and review of radiographic studies, ordering and review of laboratory studies and ordering and performing treatments and interventions    Care discussed with: admitting provider         Abby Mattson MD  02/23/24 1525       Abby Mattson MD  03/03/24 1534

## 2024-02-23 NOTE — H&P
History Of Present Illness:  Andree Johnson is a 88 y.o. female with PMHx s/f CAD, cardiomyopathy, DM2, HTN, HLD presenting with stroke-like symptoms at the nursing home during OT today. Pt was recently admitted to [facility] earlier this month for falls and was discharged to SNF on 2/2/24. Pt reportedly had an episode during her therapy this morning where she became less responsive and complained her left side hurt. There was no reported fall, syncope, or slurred speech. Pt herself is a poor historian at baseline and does not remember the incident. No staff available from the facility currently. After the incident EMS was called and she was taken to the ER. Symptoms have reportedly resolved at this time. Pt has no complaints and follows all commands. She denies chest pain, nausea, vomiting, vision changes, lightheadedness, dizziness, or syncope    ED Course (Summary):   Vitals on presentation: 97.8 F, 60 bpm, 18 rr, 105/70  Labs: CMP glu 2216 BUN 26, total bilirubin 1.3, trop 9, INR 1.1, CBC WBC 6.0 Hg 11.7 Plt 159, UA benign  Imaging: CT brain attack by my interpretation - no acute hemorrhage or process, chronic small vessel ischemic disease  Interventions: Admit for further workup    ED Course:  ED Course as of 02/23/24 1525   Fri Feb 23, 2024   1518 Upon arrival patient's NIH stroke scale is 0, Van negative because of stroke alert CT head is negative labs reviewed reassuring patient probably had a TIA already on aspirin Plavix will be hospitalized for further care. [MT]      ED Course User Index  [MT] Abby Mattson MD         Diagnoses as of 02/23/24 1525   TIA (transient ischemic attack)     Relevant Results  Results for orders placed or performed during the hospital encounter of 02/23/24 (from the past 24 hour(s))   POCT GLUCOSE   Result Value Ref Range    POCT Glucose 196 (H) 74 - 99 mg/dL   CBC and Auto Differential   Result Value Ref Range    WBC 6.0 4.4 - 11.3 x10*3/uL    nRBC 0.0 0.0 - 0.0 /100 WBCs     RBC 3.72 (L) 4.00 - 5.20 x10*6/uL    Hemoglobin 11.7 (L) 12.0 - 16.0 g/dL    Hematocrit 36.9 36.0 - 46.0 %    MCV 99 80 - 100 fL    MCH 31.5 26.0 - 34.0 pg    MCHC 31.7 (L) 32.0 - 36.0 g/dL    RDW 14.1 11.5 - 14.5 %    Platelets 159 150 - 450 x10*3/uL    Neutrophils % 62.7 40.0 - 80.0 %    Immature Granulocytes %, Automated 0.5 0.0 - 0.9 %    Lymphocytes % 23.2 13.0 - 44.0 %    Monocytes % 8.8 2.0 - 10.0 %    Eosinophils % 4.3 0.0 - 6.0 %    Basophils % 0.5 0.0 - 2.0 %    Neutrophils Absolute 3.78 1.60 - 5.50 x10*3/uL    Immature Granulocytes Absolute, Automated 0.03 0.00 - 0.50 x10*3/uL    Lymphocytes Absolute 1.40 0.80 - 3.00 x10*3/uL    Monocytes Absolute 0.53 0.05 - 0.80 x10*3/uL    Eosinophils Absolute 0.26 0.00 - 0.40 x10*3/uL    Basophils Absolute 0.03 0.00 - 0.10 x10*3/uL   Troponin I, High Sensitivity   Result Value Ref Range    Troponin I, High Sensitivity 9 0 - 13 ng/L   Protime-INR   Result Value Ref Range    Protime 12.7 9.8 - 12.8 seconds    INR 1.1 0.9 - 1.1   APTT   Result Value Ref Range    aPTT 27 27 - 38 seconds   Basic metabolic panel   Result Value Ref Range    Glucose 216 (H) 74 - 99 mg/dL    Sodium 136 136 - 145 mmol/L    Potassium 4.5 3.5 - 5.3 mmol/L    Chloride 103 98 - 107 mmol/L    Bicarbonate 28 21 - 32 mmol/L    Anion Gap 10 10 - 20 mmol/L    Urea Nitrogen 26 (H) 6 - 23 mg/dL    Creatinine 0.90 0.50 - 1.05 mg/dL    eGFR 62 >60 mL/min/1.73m*2    Calcium 8.4 (L) 8.6 - 10.3 mg/dL   Hepatic function panel   Result Value Ref Range    Albumin 3.0 (L) 3.4 - 5.0 g/dL    Bilirubin, Total 1.3 (H) 0.0 - 1.2 mg/dL    Bilirubin, Direct 0.2 0.0 - 0.3 mg/dL    Alkaline Phosphatase 70 33 - 136 U/L    ALT 14 7 - 45 U/L    AST 18 9 - 39 U/L    Total Protein 5.5 (L) 6.4 - 8.2 g/dL   Urinalysis with Reflex Culture and Microscopic   Result Value Ref Range    Color, Urine Yellow Straw, Yellow    Appearance, Urine Clear Clear    Specific Gravity, Urine 1.009 1.005 - 1.035    pH, Urine 7.0 5.0, 5.5, 6.0,  6.5, 7.0, 7.5, 8.0    Protein, Urine NEGATIVE NEGATIVE mg/dL    Glucose, Urine NEGATIVE NEGATIVE mg/dL    Blood, Urine NEGATIVE NEGATIVE    Ketones, Urine NEGATIVE NEGATIVE mg/dL    Bilirubin, Urine NEGATIVE NEGATIVE    Urobilinogen, Urine <2.0 <2.0 mg/dL    Nitrite, Urine NEGATIVE NEGATIVE    Leukocyte Esterase, Urine NEGATIVE NEGATIVE      CT brain attack head wo IV contrast    Result Date: 2/23/2024  Interpreted By:  Mack Martínez, STUDY: CT BRAIN ATTACK HEAD WO IV CONTRAST;  2/23/2024 12:37 pm   INDICATION: Signs/Symptoms:Stroke Evaluation.   COMPARISON: CT head dated 01/26/2024.   ACCESSION NUMBER(S): [id number]   ORDERING CLINICIAN: LIZZ FAIRCHILD   TECHNIQUE: Noncontrast axial CT scan of head was performed.   FINDINGS: Parenchyma: There is no intracranial hemorrhage. The grey-white differentiation is intact. There is no mass effect or midline shift. Mild-to-moderate chronic small vessel ischemic disease. Scattered atherosclerotic vascular calcification.   CSF Spaces: The ventricles, sulci and basal cisterns are within normal limits for age with generalized brain atrophy.   Extra-Axial Fluid: There is no extraaxial fluid collection.   Calvarium: No acute osseous abnormality. Mild hyperostosis frontalis interna.   Paranasal sinuses: Visualized paranasal sinuses are clear.   Mastoids: Trace dependent fluid on the right. Otherwise well aerated.   Orbits: Prior right native lens extraction.   Soft tissues: Unremarkable.       No acute intracranial hemorrhage, mass effect, or CT apparent acute infarct.   Mild-to-moderate chronic small vessel ischemic disease and generalized brain atrophy.   MACRO: Mack Martínez discussed the significance and urgency of this critical finding by telephone with  LIZZ FAIRCHILD on 2/23/2024 at 12:53 pm. (**-RCF-**) Findings:  See findings.       Signed by: Mack Martínez 2/23/2024 12:54 PM Dictation workstation:   [id number]    ECG 12 lead    Result Date: 1/31/2024  Sinus rhythm  Probable left atrial enlargement Left bundle branch block See ED provider note for full interpretation and clinical correlation Confirmed by Hesham De Paz ([id number]) on 1/31/2024 5:47:00 PM    CT cervical spine wo IV contrast    Result Date: 1/26/2024  Interpreted By:  Valerio Arambula, STUDY: CT HEAD WO IV CONTRAST; CT CERVICAL SPINE WO IV CONTRAST;  1/26/2024 10:16 pm   INDICATION: Signs/Symptoms:fall   COMPARISON: None.   ACCESSION NUMBER(S): [id number]; [id number]   ORDERING CLINICIAN: LINDEN RUTH   TECHNIQUE: Axial noncontrast CT images of head with coronal and sagittal reconstructed images. Axial noncontrast CT images of the cervical spine with coronal and sagittal reconstructed images.   FINDINGS: CT HEAD:   BRAIN PARENCHYMA:  No evidence of acute intraparenchymal hemorrhage or parenchymal evidence of acute large territory ischemic infarct. No mass-effect, midline shift or effacement of cerebral sulci. Gray-white matter distinction is preserved. Global volume loss and senescent changes again seen.     VENTRICLES and EXTRA-AXIAL SPACES:  No acute extra-axial or intraventricular hemorrhage. Ventricles and sulci are age-concordant.   PARANASAL SINUSES/MASTOIDS:  No hemorrhage or air-fluid levels within the visualized paranasal sinuses. The mastoid air cells are well-aerated.   CALVARIUM/ORBITS:  Demineralization. Vascular calcification.       CT CERVICAL SPINE: Demineralization   PREVERTEBRAL SOFT TISSUES: Within normal limits.   CRANIOCERVICAL JUNCTION: Intact.   ALIGNMENT:  No traumatic malalignment or traumatic facet widening.   VERTEBRAE:  No acute fracture. Vertebral body heights are maintained.   Mild degenerative changes seen without evidence of acute cervical spine fracture.   Vascular calcifications are seen       CT HEAD: Senescent changes. No findings of acute intracranial abnormality     CT CERVICAL SPINE: No findings of acute cervical spine fracture. Demineralization and mild degenerative changes.    Signed by: Valerio Arambula 1/26/2024 11:06 PM Dictation workstation:   [id number]    CT head wo IV contrast    Result Date: 1/26/2024  Interpreted By:  Valerio Arambula, STUDY: CT HEAD WO IV CONTRAST; CT CERVICAL SPINE WO IV CONTRAST;  1/26/2024 10:16 pm   INDICATION: Signs/Symptoms:fall   COMPARISON: None.   ACCESSION NUMBER(S): [id number]; [id number]   ORDERING CLINICIAN: LINDEN RUTH   TECHNIQUE: Axial noncontrast CT images of head with coronal and sagittal reconstructed images. Axial noncontrast CT images of the cervical spine with coronal and sagittal reconstructed images.   FINDINGS: CT HEAD:   BRAIN PARENCHYMA:  No evidence of acute intraparenchymal hemorrhage or parenchymal evidence of acute large territory ischemic infarct. No mass-effect, midline shift or effacement of cerebral sulci. Gray-white matter distinction is preserved. Global volume loss and senescent changes again seen.     VENTRICLES and EXTRA-AXIAL SPACES:  No acute extra-axial or intraventricular hemorrhage. Ventricles and sulci are age-concordant.   PARANASAL SINUSES/MASTOIDS:  No hemorrhage or air-fluid levels within the visualized paranasal sinuses. The mastoid air cells are well-aerated.   CALVARIUM/ORBITS:  Demineralization. Vascular calcification.       CT CERVICAL SPINE: Demineralization   PREVERTEBRAL SOFT TISSUES: Within normal limits.   CRANIOCERVICAL JUNCTION: Intact.   ALIGNMENT:  No traumatic malalignment or traumatic facet widening.   VERTEBRAE:  No acute fracture. Vertebral body heights are maintained.   Mild degenerative changes seen without evidence of acute cervical spine fracture.   Vascular calcifications are seen       CT HEAD: Senescent changes. No findings of acute intracranial abnormality     CT CERVICAL SPINE: No findings of acute cervical spine fracture. Demineralization and mild degenerative changes.   Signed by: Valerio Arambula 1/26/2024 11:06 PM Dictation workstation:   [id number]     Scheduled  medications:  aspirin, 81 mg, oral, Daily  atorvastatin, 80 mg, oral, Nightly  carvedilol, 12.5 mg, oral, BID with meals  clopidogrel, 75 mg, oral, Daily  enoxaparin, 40 mg, subcutaneous, q12h MDAISON  furosemide, 20 mg, oral, Daily  insulin lispro, 0-5 Units, subcutaneous, TID with meals  lisinopril, 20 mg, oral, BID  melatonin, 3 mg, oral, Daily  [START ON 2/24/2024] pantoprazole, 40 mg, oral, Daily before breakfast   Or  [START ON 2/24/2024] pantoprazole, 40 mg, intravenous, Daily before breakfast      Continuous medications:     PRN medications:  PRN medications: acetaminophen, bisacodyl, bisacodyl, dextrose 10 % in water (D10W), dextrose, glucagon, guaiFENesin, ondansetron **OR** ondansetron      Past Medical History  She has a past medical history of Abnormal findings on diagnostic imaging of heart and coronary circulation (12/04/2018), CHF (congestive heart failure) (CMS/[facility]), Coronary angioplasty status (12/04/2018), Diabetes mellitus (CMS/[facility]), Glaucoma secondary to eye trauma, right eye, mild stage (12/04/2018), Hypertension, Personal history of malignant neoplasm of breast (12/04/2018), Personal history of other diseases of the digestive system (12/04/2018), Personal history of other diseases of the musculoskeletal system and connective tissue, Personal history of other diseases of the musculoskeletal system and connective tissue (12/04/2018), Personal history of other diseases of the nervous system and sense organs (12/04/2018), and Personal history of other specified conditions.    Surgical History  She has a past surgical history that includes Other surgical history (12/04/2018); Other surgical history (12/04/2018); Other surgical history (12/04/2018); Other surgical history (12/04/2018); Other surgical history (12/04/2018); Other surgical history (12/04/2018); Other surgical history (12/04/2018); and Other surgical history (12/04/2018).     Social History  She reports that she has never smoked. She has  never used smokeless tobacco. She reports that she does not drink alcohol and does not use drugs.    Family History  Family History   Problem Relation Name Age of Onset    Hypertension Mother      Breast cancer Neg Hx          Allergies  Adhesive tape-silicones, Metformin, and Shellfish containing products    Code Status  DNR and No Intubation     Review of Systems   Constitutional:  Positive for activity change. Negative for chills, diaphoresis, fatigue and fever.   HENT:  Negative for congestion, ear pain, rhinorrhea, sinus pain and sore throat.    Respiratory:  Negative for apnea, cough, chest tightness, shortness of breath, wheezing and stridor.    Cardiovascular:  Negative for chest pain, palpitations and leg swelling.   Gastrointestinal:  Negative for abdominal distention, abdominal pain, constipation, diarrhea, nausea and vomiting.   Genitourinary:  Negative for difficulty urinating, dysuria, flank pain, frequency, hematuria and urgency.   Musculoskeletal:  Negative for arthralgias, back pain, gait problem, joint swelling, myalgias, neck pain and neck stiffness.   Skin:  Negative for pallor, rash and wound.   Neurological:  Positive for weakness. Negative for dizziness, seizures, syncope, facial asymmetry, light-headedness, numbness and headaches.   Psychiatric/Behavioral:  Positive for behavioral problems and confusion. Negative for agitation, decreased concentration and hallucinations. The patient is not nervous/anxious and is not hyperactive.    All other systems reviewed and are negative.      Last Recorded Vitals  /74   Pulse 60   Temp 36.3 °C (97.3 °F)   Resp 13   Wt 108 kg (239 lb)   SpO2 98%      Physical Exam  Vitals and nursing note reviewed.   Constitutional:       General: She is not in acute distress.     Appearance: Normal appearance. She is not ill-appearing or toxic-appearing.   HENT:      Head: Normocephalic and atraumatic.      Mouth/Throat:      Mouth: Mucous membranes are moist.       Pharynx: No posterior oropharyngeal erythema.   Eyes:      Extraocular Movements: Extraocular movements intact.      Pupils: Pupils are equal, round, and reactive to light.   Cardiovascular:      Rate and Rhythm: Normal rate and regular rhythm.      Heart sounds: Normal heart sounds. No murmur heard.     No friction rub. No gallop.   Pulmonary:      Effort: Pulmonary effort is normal. No respiratory distress.      Breath sounds: Normal breath sounds. No stridor. No wheezing, rhonchi or rales.   Abdominal:      General: Abdomen is flat. Bowel sounds are normal. There is no distension.      Palpations: Abdomen is soft. There is no mass.      Tenderness: There is no abdominal tenderness. There is no right CVA tenderness, left CVA tenderness, guarding or rebound.   Musculoskeletal:         General: No swelling, tenderness, deformity or signs of injury.      Right lower leg: No edema.      Left lower leg: No edema.   Skin:     General: Skin is warm and dry.      Findings: No erythema or rash.   Neurological:      General: No focal deficit present.      Mental Status: She is alert and oriented to person, place, and time. Mental status is at baseline.      Cranial Nerves: No cranial nerve deficit.      Sensory: No sensory deficit.      Motor: No weakness.   Psychiatric:         Mood and Affect: Mood normal.         Behavior: Behavior normal.         Thought Content: Thought content normal.         Judgment: Judgment normal.         Assessment/Plan   Active Problems:    CAD in native artery    Diabetes mellitus, type 2 (CMS/HCC)    Hyperlipidemia    Hypertension, benign    TIA (transient ischemic attack)    Hyperglycemia    Normocytic anemia    Elevated bilirubin      Plan  Admit to gen med    TIA:  NIHSS of 0 currently. Any deficits appear to have resolved.  Already on ASA and Plavix at home.  CT head negative for acute findings.  Neuro consulted.  MRI brain ordered  Neuro checks q4h  PT/OT/SS    CAD  ASA 81  Lipitor  80 mg  Coreg 12.5 BID  Plavix 75 mg    CHF  Lasix 20 mg daily    DM2  Humalog SSI #1    HTN  Lisinopril 20 mg BID    Anemia  Concerns for GI bleed last visit, possibly hemorrhoids. Was advised to follow-up with PCP.  Hg improved to 11.7, from 10.1 last.  Monitor    Cardiac diet  DVT ppx: Lovenox 40 mg BID  Code Status: DNR arrest per caregiver.       DO Bryanna Vásquez dictation software was used to dictate this note and thus there may be minor errors in translation/transcription including garbled speech or misspellings. Please contact for clarification if needed.

## 2024-02-23 NOTE — NURSING NOTE
Patient admitted in to room 2301.   A&Ox3, forgetful.    Oriented to room and call light.   Admission paperwork and med rec completed.

## 2024-02-23 NOTE — CARE PLAN
Problem: Pain  Goal: My pain/discomfort is manageable  Outcome: Progressing     Problem: Safety  Goal: Patient will be injury free during hospitalization  Outcome: Progressing  Goal: I will remain free of falls  Outcome: Progressing     Problem: Daily Care  Goal: Daily care needs are met  Outcome: Progressing     Problem: Psychosocial Needs  Goal: Demonstrates ability to cope with hospitalization/illness  Outcome: Progressing  Goal: Collaborate with me, my family, and caregiver to identify my specific goals  Outcome: Progressing     Problem: Discharge Barriers  Goal: My discharge needs are met  Outcome: Progressing     Problem: Fall/Injury  Goal: Not fall by end of shift  Outcome: Progressing  Goal: Be free from injury by end of the shift  Outcome: Progressing  Goal: Verbalize understanding of personal risk factors for fall in the hospital  Outcome: Progressing  Goal: Verbalize understanding of risk factor reduction measures to prevent injury from fall in the home  Outcome: Progressing  Goal: Use assistive devices by end of the shift  Outcome: Progressing  Goal: Pace activities to prevent fatigue by end of the shift  Outcome: Progressing     Problem: Skin  Goal: Participates in plan/prevention/treatment measures  Flowsheets (Taken 2/23/2024 1721)  Participates in plan/prevention/treatment measures: Discuss with provider PT/OT consult  Goal: Prevent/manage excess moisture  Flowsheets (Taken 2/23/2024 1721)  Prevent/manage excess moisture: Cleanse incontinence/protect with barrier cream  Goal: Prevent/minimize sheer/friction injuries  Flowsheets (Taken 2/23/2024 1721)  Prevent/minimize sheer/friction injuries:   Use pull sheet   HOB 30 degrees or less   Turn/reposition every 2 hours/use positioning/transfer devices  Goal: Promote/optimize nutrition  Flowsheets (Taken 2/23/2024 1721)  Promote/optimize nutrition:   Assist with feeding   Consume > 50% meals/supplements   Monitor/record intake including meals       The  clinical goals for the shift include patient will have no falls or injury throughout this shift

## 2024-02-23 NOTE — PROGRESS NOTES
PHARMACY STROKE RESPONSE      Patient Name: Andree Johnson  MRN: [id number]  Location: [place] [address]    Patient Weight (kg):   Wt Readings from Last 1 Encounters:   02/23/24 108 kg (239 lb)        An acute Brain Attack has been activated, pharmacy participated in multidisciplinary team bedside response for Andree Johnson.  Contraindications for fibrinolytic therapy have been reviewed by pharmacy and any issues relating to medication therapy have been discussed directly with the provider(s) caring for this patient.     Pharmacy aided in the bedside response for fibrinolytic therapy. Patient did not receive fibrinolysis. Not a candidate.          Thank you for allowing me to take part in the care of this patient.     Susana Newell, PharmD  2/23/2024  1:31 PM         References:    Neurological [facility] Stroke Tools   Neurological [facility] IV Thrombolysis Checklist

## 2024-02-24 LAB
ANION GAP SERPL CALC-SCNC: 11 MMOL/L (ref 10–20)
BUN SERPL-MCNC: 29 MG/DL (ref 6–23)
CALCIUM SERPL-MCNC: 7.7 MG/DL (ref 8.6–10.3)
CHLORIDE SERPL-SCNC: 107 MMOL/L (ref 98–107)
CO2 SERPL-SCNC: 25 MMOL/L (ref 21–32)
CREAT SERPL-MCNC: 0.76 MG/DL (ref 0.5–1.05)
EGFRCR SERPLBLD CKD-EPI 2021: 76 ML/MIN/1.73M*2
ERYTHROCYTE [DISTWIDTH] IN BLOOD BY AUTOMATED COUNT: 13.9 % (ref 11.5–14.5)
EST. AVERAGE GLUCOSE BLD GHB EST-MCNC: 180 MG/DL
GLUCOSE BLD MANUAL STRIP-MCNC: 110 MG/DL (ref 74–99)
GLUCOSE BLD MANUAL STRIP-MCNC: 191 MG/DL (ref 74–99)
GLUCOSE BLD MANUAL STRIP-MCNC: 225 MG/DL (ref 74–99)
GLUCOSE BLD MANUAL STRIP-MCNC: 93 MG/DL (ref 74–99)
GLUCOSE SERPL-MCNC: 125 MG/DL (ref 74–99)
HBA1C MFR BLD: 7.9 %
HCT VFR BLD AUTO: 33.3 % (ref 36–46)
HGB BLD-MCNC: 11.1 G/DL (ref 12–16)
HOLD SPECIMEN: NORMAL
LDLC SERPL DIRECT ASSAY-MCNC: 31 MG/DL (ref 0–129)
MCH RBC QN AUTO: 32.2 PG (ref 26–34)
MCHC RBC AUTO-ENTMCNC: 33.3 G/DL (ref 32–36)
MCV RBC AUTO: 97 FL (ref 80–100)
NRBC BLD-RTO: 0 /100 WBCS (ref 0–0)
PLATELET # BLD AUTO: 128 X10*3/UL (ref 150–450)
POTASSIUM SERPL-SCNC: 3.9 MMOL/L (ref 3.5–5.3)
RBC # BLD AUTO: 3.45 X10*6/UL (ref 4–5.2)
SODIUM SERPL-SCNC: 139 MMOL/L (ref 136–145)
WBC # BLD AUTO: 4.7 X10*3/UL (ref 4.4–11.3)

## 2024-02-24 PROCEDURE — 2500000004 HC RX 250 GENERAL PHARMACY W/ HCPCS (ALT 636 FOR OP/ED): Performed by: STUDENT IN AN ORGANIZED HEALTH CARE EDUCATION/TRAINING PROGRAM

## 2024-02-24 PROCEDURE — 99232 SBSQ HOSP IP/OBS MODERATE 35: CPT | Performed by: PSYCHIATRY & NEUROLOGY

## 2024-02-24 PROCEDURE — 85027 COMPLETE CBC AUTOMATED: CPT | Performed by: STUDENT IN AN ORGANIZED HEALTH CARE EDUCATION/TRAINING PROGRAM

## 2024-02-24 PROCEDURE — 1200000002 HC GENERAL ROOM WITH TELEMETRY DAILY

## 2024-02-24 PROCEDURE — C9113 INJ PANTOPRAZOLE SODIUM, VIA: HCPCS | Performed by: STUDENT IN AN ORGANIZED HEALTH CARE EDUCATION/TRAINING PROGRAM

## 2024-02-24 PROCEDURE — 97166 OT EVAL MOD COMPLEX 45 MIN: CPT | Mod: GO

## 2024-02-24 PROCEDURE — 36415 COLL VENOUS BLD VENIPUNCTURE: CPT | Performed by: PSYCHIATRY & NEUROLOGY

## 2024-02-24 PROCEDURE — 97162 PT EVAL MOD COMPLEX 30 MIN: CPT | Mod: GP

## 2024-02-24 PROCEDURE — 2500000001 HC RX 250 WO HCPCS SELF ADMINISTERED DRUGS (ALT 637 FOR MEDICARE OP): Performed by: PSYCHIATRY & NEUROLOGY

## 2024-02-24 PROCEDURE — 83721 ASSAY OF BLOOD LIPOPROTEIN: CPT | Mod: PORLAB | Performed by: PSYCHIATRY & NEUROLOGY

## 2024-02-24 PROCEDURE — 82947 ASSAY GLUCOSE BLOOD QUANT: CPT

## 2024-02-24 PROCEDURE — 99232 SBSQ HOSP IP/OBS MODERATE 35: CPT | Performed by: INTERNAL MEDICINE

## 2024-02-24 PROCEDURE — 2500000001 HC RX 250 WO HCPCS SELF ADMINISTERED DRUGS (ALT 637 FOR MEDICARE OP): Performed by: STUDENT IN AN ORGANIZED HEALTH CARE EDUCATION/TRAINING PROGRAM

## 2024-02-24 PROCEDURE — 82374 ASSAY BLOOD CARBON DIOXIDE: CPT | Performed by: STUDENT IN AN ORGANIZED HEALTH CARE EDUCATION/TRAINING PROGRAM

## 2024-02-24 PROCEDURE — 2500000002 HC RX 250 W HCPCS SELF ADMINISTERED DRUGS (ALT 637 FOR MEDICARE OP, ALT 636 FOR OP/ED): Performed by: STUDENT IN AN ORGANIZED HEALTH CARE EDUCATION/TRAINING PROGRAM

## 2024-02-24 PROCEDURE — 83036 HEMOGLOBIN GLYCOSYLATED A1C: CPT | Performed by: STUDENT IN AN ORGANIZED HEALTH CARE EDUCATION/TRAINING PROGRAM

## 2024-02-24 RX ADMIN — ENOXAPARIN SODIUM 40 MG: 40 INJECTION SUBCUTANEOUS at 20:32

## 2024-02-24 RX ADMIN — Medication 3 MG: at 20:32

## 2024-02-24 RX ADMIN — ENOXAPARIN SODIUM 40 MG: 40 INJECTION SUBCUTANEOUS at 08:09

## 2024-02-24 RX ADMIN — CYANOCOBALAMIN TAB 1000 MCG 1000 MCG: 1000 TAB at 08:10

## 2024-02-24 RX ADMIN — FUROSEMIDE 20 MG: 20 TABLET ORAL at 08:09

## 2024-02-24 RX ADMIN — INSULIN LISPRO 2 UNITS: 100 INJECTION, SOLUTION INTRAVENOUS; SUBCUTANEOUS at 11:42

## 2024-02-24 RX ADMIN — PANTOPRAZOLE SODIUM 40 MG: 40 INJECTION, POWDER, FOR SOLUTION INTRAVENOUS at 06:28

## 2024-02-24 RX ADMIN — CARVEDILOL 12.5 MG: 12.5 TABLET, FILM COATED ORAL at 08:10

## 2024-02-24 RX ADMIN — CLOPIDOGREL 75 MG: 75 TABLET ORAL at 08:09

## 2024-02-24 RX ADMIN — ASPIRIN 81 MG: 81 TABLET, COATED ORAL at 08:10

## 2024-02-24 RX ADMIN — ATORVASTATIN CALCIUM 80 MG: 40 TABLET, FILM COATED ORAL at 20:32

## 2024-02-24 ASSESSMENT — COGNITIVE AND FUNCTIONAL STATUS - GENERAL
MOBILITY SCORE: 10
MOVING FROM LYING ON BACK TO SITTING ON SIDE OF FLAT BED WITH BEDRAILS: A LITTLE
DRESSING REGULAR LOWER BODY CLOTHING: TOTAL
MOVING TO AND FROM BED TO CHAIR: TOTAL
TOILETING: TOTAL
TURNING FROM BACK TO SIDE WHILE IN FLAT BAD: A LOT
DAILY ACTIVITIY SCORE: 13
PERSONAL GROOMING: A LITTLE
EATING MEALS: A LITTLE
MOVING TO AND FROM BED TO CHAIR: TOTAL
MOVING FROM LYING ON BACK TO SITTING ON SIDE OF FLAT BED WITH BEDRAILS: A LITTLE
HELP NEEDED FOR BATHING: A LOT
HELP NEEDED FOR BATHING: A LOT
DAILY ACTIVITIY SCORE: 13
DRESSING REGULAR LOWER BODY CLOTHING: TOTAL
DAILY ACTIVITIY SCORE: 15
MOVING TO AND FROM BED TO CHAIR: A LOT
DRESSING REGULAR UPPER BODY CLOTHING: A LITTLE
CLIMB 3 TO 5 STEPS WITH RAILING: TOTAL
TURNING FROM BACK TO SIDE WHILE IN FLAT BAD: A LOT
TOILETING: TOTAL
DRESSING REGULAR LOWER BODY CLOTHING: A LOT
WALKING IN HOSPITAL ROOM: A LOT
DRESSING REGULAR UPPER BODY CLOTHING: A LITTLE
PERSONAL GROOMING: A LITTLE
EATING MEALS: A LITTLE
MOVING FROM LYING ON BACK TO SITTING ON SIDE OF FLAT BED WITH BEDRAILS: A LITTLE
EATING MEALS: A LITTLE
STANDING UP FROM CHAIR USING ARMS: A LOT
MOBILITY SCORE: 10
TURNING FROM BACK TO SIDE WHILE IN FLAT BAD: A LOT
CLIMB 3 TO 5 STEPS WITH RAILING: TOTAL
MOBILITY SCORE: 12
CLIMB 3 TO 5 STEPS WITH RAILING: TOTAL
DRESSING REGULAR UPPER BODY CLOTHING: A LITTLE
STANDING UP FROM CHAIR USING ARMS: A LOT
WALKING IN HOSPITAL ROOM: TOTAL
STANDING UP FROM CHAIR USING ARMS: A LOT
TOILETING: A LOT
WALKING IN HOSPITAL ROOM: TOTAL
PERSONAL GROOMING: A LITTLE
HELP NEEDED FOR BATHING: A LOT

## 2024-02-24 ASSESSMENT — PAIN - FUNCTIONAL ASSESSMENT
PAIN_FUNCTIONAL_ASSESSMENT: 0-10

## 2024-02-24 ASSESSMENT — PAIN SCALES - GENERAL
PAINLEVEL_OUTOF10: 0 - NO PAIN
PAINLEVEL_OUTOF10: 0 - NO PAIN
PAINLEVEL_OUTOF10: 5 - MODERATE PAIN
PAINLEVEL_OUTOF10: 5 - MODERATE PAIN

## 2024-02-24 ASSESSMENT — ACTIVITIES OF DAILY LIVING (ADL): BATHING_ASSISTANCE: MAXIMAL

## 2024-02-24 NOTE — PROGRESS NOTES
Andree Johnson is a 88 y.o. female on day 1 of admission presenting with TIA (transient ischemic attack).      Subjective   Andree Johnson is a 88 y.o. female with PMHx s/f CAD, cardiomyopathy, DM2, HTN, HLD presenting with stroke-like symptoms at the nursing home during OT today. Pt was recently admitted to [facility] earlier this month for falls and was discharged to SNF on 2/2/24. Pt reportedly had an episode during her therapy this morning where she became less responsive and complained her left side hurt. There was no reported fall, syncope, or slurred speech. Pt herself is a poor historian at baseline and does not remember the incident. No staff available from the facility currently. After the incident EMS was called and she was taken to the ER. Symptoms have reportedly resolved at this time. Pt has no complaints and follows all commands. She denies chest pain, nausea, vomiting, vision changes, lightheadedness, dizziness, or syncope     ED Course (Summary):   Vitals on presentation: 97.8 F, 60 bpm, 18 rr, 105/70  Labs: CMP glu 2216 BUN 26, total bilirubin 1.3, trop 9, INR 1.1, CBC WBC 6.0 Hg 11.7 Plt 159, UA benign  Imaging: CT brain attack by my interpretation - no acute hemorrhage or process, chronic small vessel ischemic disease  Interventions: Admit for further workup    2/24/2024: no acute events overnight. No focal weakness or numbness.  No acute stroke per MRI of brain. PT/OT. Risk modifications per neurology    Objective     Last Recorded Vitals  /61 (BP Location: Left arm, Patient Position: Lying)   Pulse 82   Temp 36.2 °C (97.2 °F) (Temporal)   Resp 16   Wt 108 kg (239 lb)   SpO2 95%   Intake/Output last 3 Shifts:    Intake/Output Summary (Last 24 hours) at 2/24/2024 1230  Last data filed at 2/24/2024 0717  Gross per 24 hour   Intake 240 ml   Output 0 ml   Net 240 ml       Admission Weight  Weight: 108 kg (239 lb) (02/23/24 1222)    Daily Weight  02/23/24 : 108 kg (239 lb)    Image  Results  MR angio head wo IV contrast, MR angio neck wo IV contrast  Narrative: Interpreted By:  Radha Cornell,   STUDY:  MR ANGIO HEAD WO IV CONTRAST; MR ANGIO NECK WO IV CONTRAST;  2/23/2024 6:41 pm      INDICATION:  Signs/Symptoms:unresponsiveness, left sided weakness;  Signs/Symptoms:unresponsiveness, left-sided weakness.      COMPARISON:  MRI of the brain performed earlier on the same date      ACCESSION NUMBER(S):  [id number]; [id number]      ORDERING CLINICIAN:  LIBRA EDGAR      TECHNIQUE:  Time-of-flight MRA of the head and neck was performed. The images  were reviewed as source images and maximum intensity projections.      FINDINGS:  MRA neck:      The images are degraded by artifact and patient motion.      Carotid vessels:      Irregularity and attenuation of flow related signal along the common  carotid arteries, right greater than left likely is due at least in  part to artifact. There is irregularity and attenuation of flow  related signal at the bifurcations and origins of the ICAs, right  greater than left likely due to a combination of artifact and  atherosclerotic involvement. There is no measurable luminal narrowing  on the left. On the right there is narrowing of less than 30%  relative to the caliber of the ICA more distally. The ICAs are  tortuous bilaterally and there is attenuated flow related signal in  the regions of tortuosity favored to be due to artifact.      Vertebral vessels:      The left vertebral artery is dominant. There is multifocal  attenuation of flow related signal particularly on the right which  may be due to a combination of artifact and potential areas of  stenosis.      MRA head:      The images are somewhat degraded by artifact and patient motion.      Anterior circulation:  There is some irregularity and attenuation of  flow related signal along the intracranial internal carotid arteries  likely due to a combination of artifact and atherosclerotic plaque.  There  is narrowing of the supraclinoid segments bilaterally. There is  attenuation of flow related signal along the distal M1 segments of  the MCAs and involving the remainder of the M2 and M3 segments  bilaterally. There is also attenuated flow related signal along A1  and proximal A2 segments of the ACAs.      Posterior circulation:    The left vertebral artery is dominant. The  V4 segment on the right is diminutive in caliber and the distal V3  and proximal V4 segments demonstrate markedly diminished intensity of  flow related signal. The basilar artery is patent. There is decreased  flow related signal along bilateral PCAs which are not well  visualized beyond the junction of the P1 and P2 segments.      Impression: 1. There are multifocal areas of markedly diminished flow related  signal intensity of the anterior and posterior circulation on MRA of  the head, this may be due in part to artifact although areas of  stenosis and markedly diminished flow are suspected.  2. Somewhat motion degraded MRA of the neck. There is diminished  intensity of flow related signal along the right vertebral artery in  the neck suggesting decreased flow. There is loss of flow related  signal along the distal V3 and proximal V4 segments with  reconstitution of the mid and distal V4 segment. Which could reflect  stenosis, markedly decreased flow, or partial occlusion.      MACRO:  None      Signed by: Radha Cornell 2/24/2024 7:07 AM  Dictation workstation:   [id number]      Physical Exam  Constitutional:       General: She is not in acute distress.     Appearance: Normal appearance.   HENT:      Head: Normocephalic.      Mouth/Throat:      Mouth: Mucous membranes are moist.      Pharynx: Oropharynx is clear.   Eyes:      Pupils: Pupils are equal, round, and reactive to light.   Cardiovascular:      Rate and Rhythm: Normal rate and regular rhythm.      Heart sounds: Normal heart sounds. No murmur heard.     No gallop.   Pulmonary:       Effort: Pulmonary effort is normal.      Breath sounds: Normal breath sounds.   Abdominal:      General: Bowel sounds are normal. There is no distension.      Palpations: Abdomen is soft.      Tenderness: There is no abdominal tenderness.   Musculoskeletal:         General: No swelling. Normal range of motion.      Cervical back: Neck supple. No rigidity.   Skin:     General: Skin is warm and dry.   Neurological:      General: No focal deficit present.      Mental Status: She is alert.      Cranial Nerves: No cranial nerve deficit.      Sensory: No sensory deficit.   Psychiatric:         Mood and Affect: Mood normal.         Behavior: Behavior normal.     Scheduled medications  aspirin, 81 mg, oral, Daily  atorvastatin, 80 mg, oral, Nightly  carvedilol, 12.5 mg, oral, BID with meals  clopidogrel, 75 mg, oral, Daily  cyanocobalamin, 1,000 mcg, intramuscular, Daily  cyanocobalamin, 1,000 mcg, oral, Daily  enoxaparin, 40 mg, subcutaneous, q12h [place]  furosemide, 20 mg, oral, Daily  insulin lispro, 0-5 Units, subcutaneous, TID with meals  [Held by provider] lisinopril, 20 mg, oral, BID  melatonin, 3 mg, oral, Daily  pantoprazole, 40 mg, oral, Daily before breakfast   Or  pantoprazole, 40 mg, intravenous, Daily before breakfast      Continuous medications     PRN medications  PRN medications: acetaminophen, bisacodyl, bisacodyl, dextrose 10 % in water (D10W), dextrose, glucagon, guaiFENesin, ondansetron **OR** ondansetron  Results for orders placed or performed during the hospital encounter of 02/23/24 (from the past 96 hour(s))   POCT GLUCOSE   Result Value Ref Range    POCT Glucose 196 (H) 74 - 99 mg/dL   CBC and Auto Differential   Result Value Ref Range    WBC 6.0 4.4 - 11.3 x10*3/uL    nRBC 0.0 0.0 - 0.0 /100 WBCs    RBC 3.72 (L) 4.00 - 5.20 x10*6/uL    Hemoglobin 11.7 (L) 12.0 - 16.0 g/dL    Hematocrit 36.9 36.0 - 46.0 %    MCV 99 80 - 100 fL    MCH 31.5 26.0 - 34.0 pg    MCHC 31.7 (L) 32.0 - 36.0 g/dL    RDW 14.1  11.5 - 14.5 %    Platelets 159 150 - 450 x10*3/uL    Neutrophils % 62.7 40.0 - 80.0 %    Immature Granulocytes %, Automated 0.5 0.0 - 0.9 %    Lymphocytes % 23.2 13.0 - 44.0 %    Monocytes % 8.8 2.0 - 10.0 %    Eosinophils % 4.3 0.0 - 6.0 %    Basophils % 0.5 0.0 - 2.0 %    Neutrophils Absolute 3.78 1.60 - 5.50 x10*3/uL    Immature Granulocytes Absolute, Automated 0.03 0.00 - 0.50 x10*3/uL    Lymphocytes Absolute 1.40 0.80 - 3.00 x10*3/uL    Monocytes Absolute 0.53 0.05 - 0.80 x10*3/uL    Eosinophils Absolute 0.26 0.00 - 0.40 x10*3/uL    Basophils Absolute 0.03 0.00 - 0.10 x10*3/uL   Troponin I, High Sensitivity   Result Value Ref Range    Troponin I, High Sensitivity 9 0 - 13 ng/L   Protime-INR   Result Value Ref Range    Protime 12.7 9.8 - 12.8 seconds    INR 1.1 0.9 - 1.1   APTT   Result Value Ref Range    aPTT 27 27 - 38 seconds   Basic metabolic panel   Result Value Ref Range    Glucose 216 (H) 74 - 99 mg/dL    Sodium 136 136 - 145 mmol/L    Potassium 4.5 3.5 - 5.3 mmol/L    Chloride 103 98 - 107 mmol/L    Bicarbonate 28 21 - 32 mmol/L    Anion Gap 10 10 - 20 mmol/L    Urea Nitrogen 26 (H) 6 - 23 mg/dL    Creatinine 0.90 0.50 - 1.05 mg/dL    eGFR 62 >60 mL/min/1.73m*2    Calcium 8.4 (L) 8.6 - 10.3 mg/dL   Hepatic function panel   Result Value Ref Range    Albumin 3.0 (L) 3.4 - 5.0 g/dL    Bilirubin, Total 1.3 (H) 0.0 - 1.2 mg/dL    Bilirubin, Direct 0.2 0.0 - 0.3 mg/dL    Alkaline Phosphatase 70 33 - 136 U/L    ALT 14 7 - 45 U/L    AST 18 9 - 39 U/L    Total Protein 5.5 (L) 6.4 - 8.2 g/dL   Vitamin B12   Result Value Ref Range    Vitamin B12 248 211 - 911 pg/mL   Urinalysis with Reflex Culture and Microscopic   Result Value Ref Range    Color, Urine Yellow Straw, Yellow    Appearance, Urine Clear Clear    Specific Gravity, Urine 1.009 1.005 - 1.035    pH, Urine 7.0 5.0, 5.5, 6.0, 6.5, 7.0, 7.5, 8.0    Protein, Urine NEGATIVE NEGATIVE mg/dL    Glucose, Urine NEGATIVE NEGATIVE mg/dL    Blood, Urine NEGATIVE  NEGATIVE    Ketones, Urine NEGATIVE NEGATIVE mg/dL    Bilirubin, Urine NEGATIVE NEGATIVE    Urobilinogen, Urine <2.0 <2.0 mg/dL    Nitrite, Urine NEGATIVE NEGATIVE    Leukocyte Esterase, Urine NEGATIVE NEGATIVE   Extra Urine Gray Tube   Result Value Ref Range    Extra Tube Hold for add-ons.    POCT GLUCOSE   Result Value Ref Range    POCT Glucose 258 (H) 74 - 99 mg/dL   Hemoglobin A1C   Result Value Ref Range    Hemoglobin A1C 7.9 (H) see below %    Estimated Average Glucose 180 Not Established mg/dL   CBC   Result Value Ref Range    WBC 4.7 4.4 - 11.3 x10*3/uL    nRBC 0.0 0.0 - 0.0 /100 WBCs    RBC 3.45 (L) 4.00 - 5.20 x10*6/uL    Hemoglobin 11.1 (L) 12.0 - 16.0 g/dL    Hematocrit 33.3 (L) 36.0 - 46.0 %    MCV 97 80 - 100 fL    MCH 32.2 26.0 - 34.0 pg    MCHC 33.3 32.0 - 36.0 g/dL    RDW 13.9 11.5 - 14.5 %    Platelets 128 (L) 150 - 450 x10*3/uL   Basic metabolic panel   Result Value Ref Range    Glucose 125 (H) 74 - 99 mg/dL    Sodium 139 136 - 145 mmol/L    Potassium 3.9 3.5 - 5.3 mmol/L    Chloride 107 98 - 107 mmol/L    Bicarbonate 25 21 - 32 mmol/L    Anion Gap 11 10 - 20 mmol/L    Urea Nitrogen 29 (H) 6 - 23 mg/dL    Creatinine 0.76 0.50 - 1.05 mg/dL    eGFR 76 >60 mL/min/1.73m*2    Calcium 7.7 (L) 8.6 - 10.3 mg/dL   LDL Cholesterol, Direct   Result Value Ref Range    LDL, Direct 31 0 - 129 mg/dL   POCT GLUCOSE   Result Value Ref Range    POCT Glucose 110 (H) 74 - 99 mg/dL   POCT GLUCOSE   Result Value Ref Range    POCT Glucose 225 (H) 74 - 99 mg/dL         Relevant Results               Assessment/Plan                  Principal Problem:    TIA (transient ischemic attack)  Active Problems:    CAD in native artery    Diabetes mellitus, type 2 (CMS/HCC)    Hyperlipidemia    Hypertension, benign    Hyperglycemia    Normocytic anemia    Elevated bilirubin    1. TIA (transient ischemic attack)      no acute stroke, risk modifcations      2. Hypertension, benign      continue home meds      3. CAD in native artery         4. Cardiomyopathy, unspecified type (CMS/HCC)      continue tom Carroll MD

## 2024-02-24 NOTE — PROGRESS NOTES
"PROGRESS NOTE    Patient seen this morning. No family members around.    Pt was sleeping but easily arousable. Again states she thinks she fell yesterday, but when I told her her nurse at the nursing home didn't mention any fall she told me she wasn't sure.     No new neurological symptoms.    Allergies  Adhesive tape-silicones, Metformin, and Shellfish containing products    Current Meds  Scheduled medications  aspirin, 81 mg, oral, Daily  atorvastatin, 80 mg, oral, Nightly  carvedilol, 12.5 mg, oral, BID with meals  clopidogrel, 75 mg, oral, Daily  cyanocobalamin, 1,000 mcg, intramuscular, Daily  cyanocobalamin, 1,000 mcg, oral, Daily  enoxaparin, 40 mg, subcutaneous, q12h [place]  furosemide, 20 mg, oral, Daily  insulin lispro, 0-5 Units, subcutaneous, TID with meals  [Held by provider] lisinopril, 20 mg, oral, BID  melatonin, 3 mg, oral, Daily  pantoprazole, 40 mg, oral, Daily before breakfast   Or  pantoprazole, 40 mg, intravenous, Daily before breakfast      Continuous medications     PRN medications  PRN medications: acetaminophen, bisacodyl, bisacodyl, dextrose 10 % in water (D10W), dextrose, glucagon, guaiFENesin, ondansetron **OR** ondansetron    Last Recorded Vitals  Blood pressure 106/61, pulse 82, temperature 36.2 °C (97.2 °F), temperature source Temporal, resp. rate 16, height 1.575 m (5' 2\"), weight 108 kg (239 lb), SpO2 95 %.    Neuro Exam  Awake, alert, oriented, in no distress  Patient able to answer simple questions and follow simple commands  Well-nourished, in bed      Supple neck      Mental status exam as above, conversant   No facial droop   Hearing grossly intact   No dysarthria     Motor strength is at least antigravity on all extremities, Unable to have her stand or walk    Relevant Results  I have personally reviewed the following:    Labs  Results for orders placed or performed during the hospital encounter of 02/23/24 (from the past 24 hour(s))   POCT GLUCOSE   Result Value Ref Range    " POCT Glucose 196 (H) 74 - 99 mg/dL   CBC and Auto Differential   Result Value Ref Range    WBC 6.0 4.4 - 11.3 x10*3/uL    nRBC 0.0 0.0 - 0.0 /100 WBCs    RBC 3.72 (L) 4.00 - 5.20 x10*6/uL    Hemoglobin 11.7 (L) 12.0 - 16.0 g/dL    Hematocrit 36.9 36.0 - 46.0 %    MCV 99 80 - 100 fL    MCH 31.5 26.0 - 34.0 pg    MCHC 31.7 (L) 32.0 - 36.0 g/dL    RDW 14.1 11.5 - 14.5 %    Platelets 159 150 - 450 x10*3/uL    Neutrophils % 62.7 40.0 - 80.0 %    Immature Granulocytes %, Automated 0.5 0.0 - 0.9 %    Lymphocytes % 23.2 13.0 - 44.0 %    Monocytes % 8.8 2.0 - 10.0 %    Eosinophils % 4.3 0.0 - 6.0 %    Basophils % 0.5 0.0 - 2.0 %    Neutrophils Absolute 3.78 1.60 - 5.50 x10*3/uL    Immature Granulocytes Absolute, Automated 0.03 0.00 - 0.50 x10*3/uL    Lymphocytes Absolute 1.40 0.80 - 3.00 x10*3/uL    Monocytes Absolute 0.53 0.05 - 0.80 x10*3/uL    Eosinophils Absolute 0.26 0.00 - 0.40 x10*3/uL    Basophils Absolute 0.03 0.00 - 0.10 x10*3/uL   Troponin I, High Sensitivity   Result Value Ref Range    Troponin I, High Sensitivity 9 0 - 13 ng/L   Protime-INR   Result Value Ref Range    Protime 12.7 9.8 - 12.8 seconds    INR 1.1 0.9 - 1.1   APTT   Result Value Ref Range    aPTT 27 27 - 38 seconds   Basic metabolic panel   Result Value Ref Range    Glucose 216 (H) 74 - 99 mg/dL    Sodium 136 136 - 145 mmol/L    Potassium 4.5 3.5 - 5.3 mmol/L    Chloride 103 98 - 107 mmol/L    Bicarbonate 28 21 - 32 mmol/L    Anion Gap 10 10 - 20 mmol/L    Urea Nitrogen 26 (H) 6 - 23 mg/dL    Creatinine 0.90 0.50 - 1.05 mg/dL    eGFR 62 >60 mL/min/1.73m*2    Calcium 8.4 (L) 8.6 - 10.3 mg/dL   Hepatic function panel   Result Value Ref Range    Albumin 3.0 (L) 3.4 - 5.0 g/dL    Bilirubin, Total 1.3 (H) 0.0 - 1.2 mg/dL    Bilirubin, Direct 0.2 0.0 - 0.3 mg/dL    Alkaline Phosphatase 70 33 - 136 U/L    ALT 14 7 - 45 U/L    AST 18 9 - 39 U/L    Total Protein 5.5 (L) 6.4 - 8.2 g/dL   Vitamin B12   Result Value Ref Range    Vitamin B12 248 211 - 911 pg/mL    Urinalysis with Reflex Culture and Microscopic   Result Value Ref Range    Color, Urine Yellow Straw, Yellow    Appearance, Urine Clear Clear    Specific Gravity, Urine 1.009 1.005 - 1.035    pH, Urine 7.0 5.0, 5.5, 6.0, 6.5, 7.0, 7.5, 8.0    Protein, Urine NEGATIVE NEGATIVE mg/dL    Glucose, Urine NEGATIVE NEGATIVE mg/dL    Blood, Urine NEGATIVE NEGATIVE    Ketones, Urine NEGATIVE NEGATIVE mg/dL    Bilirubin, Urine NEGATIVE NEGATIVE    Urobilinogen, Urine <2.0 <2.0 mg/dL    Nitrite, Urine NEGATIVE NEGATIVE    Leukocyte Esterase, Urine NEGATIVE NEGATIVE   Extra Urine Gray Tube   Result Value Ref Range    Extra Tube Hold for add-ons.    POCT GLUCOSE   Result Value Ref Range    POCT Glucose 258 (H) 74 - 99 mg/dL   Hemoglobin A1C   Result Value Ref Range    Hemoglobin A1C 7.9 (H) see below %    Estimated Average Glucose 180 Not Established mg/dL   CBC   Result Value Ref Range    WBC 4.7 4.4 - 11.3 x10*3/uL    nRBC 0.0 0.0 - 0.0 /100 WBCs    RBC 3.45 (L) 4.00 - 5.20 x10*6/uL    Hemoglobin 11.1 (L) 12.0 - 16.0 g/dL    Hematocrit 33.3 (L) 36.0 - 46.0 %    MCV 97 80 - 100 fL    MCH 32.2 26.0 - 34.0 pg    MCHC 33.3 32.0 - 36.0 g/dL    RDW 13.9 11.5 - 14.5 %    Platelets 128 (L) 150 - 450 x10*3/uL   Basic metabolic panel   Result Value Ref Range    Glucose 125 (H) 74 - 99 mg/dL    Sodium 139 136 - 145 mmol/L    Potassium 3.9 3.5 - 5.3 mmol/L    Chloride 107 98 - 107 mmol/L    Bicarbonate 25 21 - 32 mmol/L    Anion Gap 11 10 - 20 mmol/L    Urea Nitrogen 29 (H) 6 - 23 mg/dL    Creatinine 0.76 0.50 - 1.05 mg/dL    eGFR 76 >60 mL/min/1.73m*2    Calcium 7.7 (L) 8.6 - 10.3 mg/dL   LDL Cholesterol, Direct   Result Value Ref Range    LDL, Direct 31 0 - 129 mg/dL   POCT GLUCOSE   Result Value Ref Range    POCT Glucose 110 (H) 74 - 99 mg/dL   POCT GLUCOSE   Result Value Ref Range    POCT Glucose 225 (H) 74 - 99 mg/dL       Imaging results  === 02/23/24 ===    CT BRAIN ATTACK HEAD WO CONTRAST    - Impression -  No acute intracranial  hemorrhage, mass effect, or CT apparent acute  infarct.    Mild-to-moderate chronic small vessel ischemic disease and  generalized brain atrophy.    MACRO:  Mack Martínez discussed the significance and urgency of this critical  finding by telephone with  LIZZ FAIRCHILD on 2/23/2024 at 12:53 pm.  (**-RCF-**) Findings:  See findings.        Signed by: Mack Martínez 2/23/2024 12:54 PM  Dictation workstation:   [id number]   === 02/23/24 ===    MR NECK ANGIO WO IV CONTRAST    - Impression -  1. There are multifocal areas of markedly diminished flow related  signal intensity of the anterior and posterior circulation on MRA of  the head, this may be due in part to artifact although areas of  stenosis and markedly diminished flow are suspected.  2. Somewhat motion degraded MRA of the neck. There is diminished  intensity of flow related signal along the right vertebral artery in  the neck suggesting decreased flow. There is loss of flow related  signal along the distal V3 and proximal V4 segments with  reconstitution of the mid and distal V4 segment. Which could reflect  stenosis, markedly decreased flow, or partial occlusion.    MACRO:  None    Signed by: Radha Cornell 2/24/2024 7:07 AM  Dictation workstation:   [id number]       Assessment/Plan  1.  Alteration of consciousness  2.  Left-sided weakness  Exam non-focal--though pt doesn't remember what happened and ? with psychomotor slowing (vs this is her baseline)  No reported seizure-like activity  DDx: ? TIA, syncopal episode, less likely seizure  MRI brain with no acute stroke, there are some nonspecific subcortical white matter disease bilaterally  Patient already on aspirin/clopidogrel/statin     3.  Cerebrovascular disease  4.  Right vertebral artery stenosis/occlusion (age-indeterminate, no acute stroke)  Note that cerebrovascular imaging done 2018 seems to indicate insignificant findings  Improve vascular risk factor control    5.  Left parotid  mass  Incidental finding on MRI  Defer to primary team     6.  History of B12 deficiency  Repeat today is 248 despite oral B12 supplementation     7.  Diabetes     Cardiac work-up for poss syncope, otherwise treat as poss TIA        Recommendations:  Continue ASA 81mg daily (with food) and clopidogrel 75 mg daily  Continue atorvastatin   Improve control of vascular risk factors specifically diabetes  4.   Cardiac work-up for poss syncope--defer to primary team  5.   Allow permissive hypertension for at least 24 more hours after stroke-symptom onset  6.   Check orthostatic VS (at least laying/sitting if able)  7.   Continue B12 supplementation c/o primary team--? consider IM B12?  8.   Consider outpatient eval of parotid mass if needed  9.  PT eval  10. Consider EEG on Monday if pt still here (otherwise consider doing outpatient)     Discussed with pt.     All questions answered. Please call with questions.    Brandon Godinez MD  2/24/2024  11:31 AM

## 2024-02-24 NOTE — PROGRESS NOTES
Physical Therapy    Physical Therapy Evaluation    Patient Name: Andree Johnson  MRN: [id number]  Today's Date: 2/24/2024   Time Calculation  Start Time: 1154  Stop Time: 1215  Time Calculation (min): 21 min    Assessment/Plan   PT Assessment  PT Assessment Results: Decreased strength, Decreased range of motion, Decreased endurance, Impaired balance, Decreased mobility, Impaired judgement, Decreased safety awareness, Impaired hearing, Decreased skin integrity, Pain  Rehab Prognosis: Fair  Evaluation/Treatment Tolerance: Patient limited by pain, Patient limited by fatigue  Medical Staff Made Aware: Yes  Barriers to Participation: Comorbidities, Insight into problems  End of Session Communication: Bedside nurse  Assessment Comment: Pt admitted for L sided weakness and unresponsive episode maurizio working with therapy in SNF. MRI negative for acute processes. Pt was living at home prior to going to SNF for rehab and reports being MI for mobility with rollator. Pt reqired min to max A x1 for bed mobility and max a x1 for transfers. She was unable to take steps at walker. Limited by pain in back and knees. Would benefit from PT services to restore PLOF and facilitate safe discharge planning  End of Session Patient Position: Bed, 3 rail up, Alarm on  IP OR SWING BED PT PLAN  Inpatient or Swing Bed: Inpatient  PT Plan  Treatment/Interventions: Bed mobility, Transfer training, Gait training, Balance training, Strengthening, Endurance training, Range of motion, Therapeutic exercise, Therapeutic activity, Positioning, Wheelchair management, Home exercise program  PT Plan: Skilled PT  PT Frequency: 3 times per week  PT Discharge Recommendations: Moderate intensity level of continued care  Equipment Recommended upon Discharge: Wheelchair, Wheeled walker  PT Recommended Transfer Status: Assist x1, Assist x2, Assistive device  PT - OK to Discharge: Yes      Subjective   General Visit Information:  General  Reason for Referral:  impaired mobility  Referred By: Brandon  Past Medical History Relevant to Rehab: HTN, CAD s/p angioplasty, DM, glaucoma  Family/Caregiver Present: No  Co-Treatment: OT  Co-Treatment Reason: to maximize pt safety while focusing on discipline specific goals  Prior to Session Communication: Bedside nurse  Patient Position Received: Bed, 3 rail up, Alarm on  General Comment: Pt seen in room 2302. Tribal. Decreased motivation to attempt OOB activities but did cooperate  Home Living:  Home Living  Type of Home: House  Lives With: Alone  Home Adaptive Equipment: Walker rolling or standard  Home Access: Level entry  Home Living Comments: pt reports she lived at home alone, no CESAR. States she used rollator for mobility and was MI for mobility in home.Does not drive. States son lives next door and helps with transportation and errands. States grandson helps with IADLs.  Prior Level of Function:  Prior Function Per Pt/Caregiver Report  Level of [place]:  (MI for mobility with AD)  Prior Function Comments: was recently discharged from hospital to SnF and was receiving therapy services before returning to hospital this time for LOC/unresponsive episode and L sided weakness. Pt states she was a 1 assist in SNF, using bedpan and using wc. Reports 1 fall in SNF  Precautions:  Precautions  Precautions Comment: Fall precautions, Tribal, BP in LUE only  Vital Signs:       Objective   Pain:  Pain Assessment  Pain Assessment: 0-10  Pain Score: 5 - Moderate pain  Pain Type: Chronic pain, Acute pain  Pain Location: Back (knees when standing)  Pain Interventions: Repositioned, Emotional support, Distraction, Ambulation/increased activity  Cognition:  Cognition  Overall Cognitive Status:  (orientedx3, did not know year initially stated 2023 but corrected herself later)  Safety/Judgement: Exceptions to WFL  Insight: Moderate    General Assessments:                  Activity Tolerance  Endurance: Tolerates less than 10 min exercise with changes  in vital signs (BP fluctuated with positional changes but was not accurate due to inability of pt to relax UE during all readings)  Activity Tolerance Comments: limited by fatigue and pain    Sensation  Light Touch: No apparent deficits    Strength  Strength Comments: AROM grossly ~75% of WFL, Ble strength grossly 3- to 3/5 in available ROM  Strength  Strength Comments: AROM grossly ~75% of WFL, Ble strength grossly 3- to 3/5 in available ROM           Coordination  Movements are Fluid and Coordinated: Yes         Static Sitting Balance  Static Sitting-Balance Support: Bilateral upper extremity supported  Static Sitting-Level of Assistance: Contact guard, Close supervision  Dynamic Sitting Balance  Dynamic Sitting-Balance Support: Bilateral upper extremity supported  Dynamic Sitting-Balance: Trunk control activities  Dynamic Sitting-Comments: F    Static Standing Balance  Static Standing-Balance Support: Bilateral upper extremity supported  Static Standing-Level of Assistance: Maximum assistance, Moderate assistance  Static Standing-Comment/Number of Minutes: <1 minute  Dynamic Standing Balance  Dynamic Standing-Balance Support: Bilateral upper extremity supported  Dynamic Standing-Balance:  (weightshifting,attempted steps)  Dynamic Standing-Comments: P  Functional Assessments:       Bed Mobility  Bed Mobility: Yes  Bed Mobility 1  Bed Mobility 1: Supine to sitting, Sitting to supine (min/mod A x1 supine to sit, max A x1 sit to supine)    Transfers  Transfer: Yes  Transfer 1  Transfer From 1: Sit to  Transfer to 1: Stand  Technique 1: Sit to stand  Transfer Device 1: Walker  Transfer Level of Assistance 1: Maximum assistance    Ambulation/Gait Training  Ambulation/Gait Training Performed: No (unable to take side steps at FWW)    Stairs  Stairs: No       Extremity/Trunk Assessments:     Outcome Measures:  [facility] Basic Mobility  Turning from your back to your side while in a flat bed without using bedrails: A  little  Moving from lying on your back to sitting on the side of a flat bed without using bedrails: A lot  Moving to and from bed to chair (including a wheelchair): Total  Standing up from a chair using your arms (e.g. wheelchair or bedside chair): A lot  To walk in hospital room: Total  Climbing 3-5 steps with railing: Total  Basic Mobility - Total Score: 10    Encounter Problems       Encounter Problems (Active)       Mobility       STG - Patient will ambulate up to 25'x2 with LRD on even surfaces with CGA/Oly x1 and no LOB (Not Progressing)       Start:  02/24/24    Expected End:  03/09/24               Transfers       STG - Transfer from bed to chair using LRD with min A x1 (Progressing)       Start:  02/24/24    Expected End:  03/09/24            STG - Patient will perform bed mobility with CGA x1  (Progressing)       Start:  02/24/24    Expected End:  03/09/24                   Education Documentation  Mobility Training, taught by Kamilah Simmons, PT at 2/24/2024 12:42 PM.  Learner: Patient  Readiness: Acceptance  Method: Explanation, Demonstration  Response: Needs Reinforcement  Comment: education provided on safe transfer techniques including hand and foot placement, weightshifting, and safe positioning of AD to improve participation and reduce fall risk    Education Comments  No comments found.

## 2024-02-24 NOTE — CARE PLAN
The patient's goals for the shift include      The clinical goals for the shift include pt will remain free fromf all or injury    Over the shift, the patient did not make progress toward the following goals. Barriers to progression include . Recommendations to address these barriers include   Problem: Pain  Goal: My pain/discomfort is manageable  2/24/2024 0607 by Ceasar Mirza RN  Outcome: Progressing  2/24/2024 0607 by Ceasar Mirza RN  Outcome: Progressing     Problem: Safety  Goal: Patient will be injury free during hospitalization  2/24/2024 0607 by Ceasar Mirza RN  Outcome: Progressing  2/24/2024 0607 by Ceasar Mirza RN  Outcome: Progressing  Goal: I will remain free of falls  2/24/2024 0607 by Ceasar Mirza RN  Outcome: Progressing  2/24/2024 0607 by Ceasar Mirza RN  Outcome: Progressing     Problem: Daily Care  Goal: Daily care needs are met  2/24/2024 0607 by Ceasar Mirza RN  Outcome: Progressing  2/24/2024 0607 by Ceasar Mirza RN  Outcome: Progressing     Problem: Psychosocial Needs  Goal: Demonstrates ability to cope with hospitalization/illness  2/24/2024 0607 by Ceasar Mirza RN  Outcome: Progressing  2/24/2024 0607 by Ceasar Mirza RN  Outcome: Progressing  Goal: Collaborate with me, my family, and caregiver to identify my specific goals  2/24/2024 0607 by Ceasar Mirza RN  Outcome: Progressing  2/24/2024 0607 by Ceasar Mirza RN  Outcome: Progressing     Problem: Discharge Barriers  Goal: My discharge needs are met  2/24/2024 0607 by Ceasar Mirza RN  Outcome: Progressing  2/24/2024 0607 by Ceasar Mirza RN  Outcome: Progressing     Problem: Fall/Injury  Goal: Not fall by end of shift  2/24/2024 0607 by Ceasar Mirza RN  Outcome: Progressing  2/24/2024 0607 by Ceasar Mirza RN  Outcome: Progressing  Goal: Be free from injury by end of the shift  2/24/2024 0607 by Ceasar Mirza RN  Outcome: Progressing  2/24/2024 0607 by Ceasar Mirza RN  Outcome:  Progressing  Goal: Verbalize understanding of personal risk factors for fall in the hospital  2/24/2024 0607 by Ceasar Mirza RN  Outcome: Progressing  2/24/2024 0607 by Ceasar Mirza RN  Outcome: Progressing  Goal: Verbalize understanding of risk factor reduction measures to prevent injury from fall in the home  2/24/2024 0607 by Caesar Mirza RN  Outcome: Progressing  2/24/2024 0607 by Ceasar Mirza RN  Outcome: Progressing  Goal: Use assistive devices by end of the shift  2/24/2024 0607 by Ceasar Mirza RN  Outcome: Progressing  2/24/2024 0607 by Ceasar Mirza RN  Outcome: Progressing  Goal: Pace activities to prevent fatigue by end of the shift  2/24/2024 0607 by Ceasar Mirza RN  Outcome: Progressing  2/24/2024 0607 by Ceasar Mirza RN  Outcome: Progressing     Problem: Skin  Goal: Participates in plan/prevention/treatment measures  2/24/2024 0607 by Ceasar Mirza RN  Outcome: Progressing  2/24/2024 0607 by Ceasar Mirza RN  Outcome: Progressing  Goal: Prevent/manage excess moisture  2/24/2024 0607 by Ceasar Mirza RN  Outcome: Progressing  2/24/2024 0607 by Ceasar Mirza RN  Outcome: Progressing  Goal: Prevent/minimize sheer/friction injuries  2/24/2024 0607 by Ceasar Mirza RN  Outcome: Progressing  Flowsheets (Taken 2/24/2024 0607)  Prevent/minimize sheer/friction injuries:   HOB 30 degrees or less   Turn/reposition every 2 hours/use positioning/transfer devices   Use pull sheet  2/24/2024 0607 by Ceasar Mirza RN  Outcome: Progressing  Flowsheets (Taken 2/24/2024 0607)  Prevent/minimize sheer/friction injuries:   HOB 30 degrees or less   Turn/reposition every 2 hours/use positioning/transfer devices   Use pull sheet  Goal: Promote/optimize nutrition  2/24/2024 0607 by Ceasar Mirza RN  Outcome: Progressing  2/24/2024 0607 by Ceasar Mirza RN  Outcome: Progressing   .

## 2024-02-24 NOTE — PROGRESS NOTES
Occupational Therapy  Evaluation    Patient Name: Andree Johnson  MRN: [id number]  Today's Date: 2/24/2024  Time Calculation  Start Time: 1155  Stop Time: 1217  Time Calculation (min): 22 min    Assessment  IP OT Assessment  Prognosis: Fair  Barriers to Discharge:  (need for assist, cognition, motivation)  Medical Staff Made Aware: Yes  End of Session Communication: Bedside nurse  End of Session Patient Position: Bed, 3 rail up, Alarm on  OT assessment:  Pt demonstrated decreased balance, strength , activity tolerance , and safety awareness  which impacted safe ADL/IADL completion. Pt is not at their baseline functional participation at this time. Pt would benefit from continued skills occupational therapy services during hospitalization in order to promote independence in daily routines for safe discharge.    Plan:  Treatment Interventions: ADL retraining, Functional transfer training, UE strengthening/ROM, Endurance training, Patient/family training, Equipment evaluation/education, Compensatory technique education  OT Frequency: 3 times per week  OT Discharge Recommendations: Moderate intensity level of continued care  Equipment Recommended upon Discharge:  (TBD)  OT - OK to Discharge: Yes    Subjective     Current Problem:  1. TIA (transient ischemic attack)      no acute stroke, risk modifcations      2. Hypertension, benign      continue home meds      3. CAD in native artery        4. Cardiomyopathy, unspecified type (CMS/HCC)      continue lasix          General:  General  Reason for Referral: ADLs and Safety Assessment  Referred By: Devon Taylor DO  Past Medical History Relevant to Rehab: HTN, CAD s/p angioplasty, DM, glaucoma  Family/Caregiver Present: No  Co-Treatment: PT  Co-Treatment Reason: Co-eval and treat completed in order to optimize patient safety and function in order to promote OT goal achievement  Prior to Session Communication: Bedside nurse  Patient Position Received: Bed, 3 rail up    Past  Medical History:   Diagnosis Date    Abnormal findings on diagnostic imaging of heart and coronary circulation 12/04/2018    Abnormal echocardiogram    CHF (congestive heart failure) (CMS/[facility])     Coronary angioplasty status 12/04/2018    S/P PTCA (percutaneous transluminal coronary angioplasty)    Diabetes mellitus (CMS/[facility])     Glaucoma secondary to eye trauma, right eye, mild stage 12/04/2018    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, mild stage    Hypertension     Personal history of malignant neoplasm of breast 12/04/2018    History of malignant neoplasm of breast    Personal history of other diseases of the digestive system 12/04/2018    History of constipation    Personal history of other diseases of the musculoskeletal system and connective tissue     History of arthritis    Personal history of other diseases of the musculoskeletal system and connective tissue 12/04/2018    History of chronic back pain    Personal history of other diseases of the nervous system and sense organs 12/04/2018    History of glaucoma    Personal history of other specified conditions     History of dizziness     Past Surgical History:   Procedure Laterality Date    OTHER SURGICAL HISTORY  12/04/2018    Hysterectomy    OTHER SURGICAL HISTORY  12/04/2018    Breast biopsy    OTHER SURGICAL HISTORY  12/04/2018    Colonoscopy    OTHER SURGICAL HISTORY  12/04/2018    Laparoscopy    OTHER SURGICAL HISTORY  12/04/2018    Hernia repair    OTHER SURGICAL HISTORY  12/04/2018    Cataract surgery    OTHER SURGICAL HISTORY  12/04/2018    Cholecystectomy    OTHER SURGICAL HISTORY  12/04/2018    Mastectomy           Pt admitted 2/23 s/p fall out of wc and unresponsive episode at rehab facility.  Orders received, chart reviewed, eval complete    Precautions:  Medical Precautions: Fall precautions (monitor BP- LUE only, hard of hearing)    Vital Signs:   Orthostatic vitals: supine: 123/51 HR 62   /82 HR 69   Standing: Pt unable to  tolerate standing long enough to get BP reading - seated OEB BP read 86/57 - unsure accuracy 2/2 artifact and moving LUE   Pt returned to supine 160/75 HR 75     Pt did c/o dizziness throughout session and required encouragement for participation     Pain:  Pain Assessment  Pain Assessment: 0-10  Pain Score: 5 - Moderate pain  Pain Type: Chronic pain, Acute pain (back)  Pain Location: Back  Pain Interventions: Repositioned, Emotional support, Distraction    Objective     Cognition:  Overall Cognitive Status: Impaired  Orientation Level: Disoriented to time  Memory: Exceptions to WFL  Short-Term Memory: Impaired  Problem Solving: Exceptions to WFL  Complex Functional Tasks: Impaired  Managing Medications: Impaired  Safety/Judgement: Exceptions to WFL  Novel Situations: Moderate  Insight: Moderate             Home Living:  Type of Home:  (Pt admitted from [facility], Pt reports she was transferring to the  with assist x1. pt reports using a bed pan for toileting and haivng assist for all ADLs)     Prior Function:  Level of [place]: Needs assistance with ADLs, Needs assistance with homemaking, Needs assistance with functional transfers (Prior to the end of january, pt was home alone with support from son for IADLs, bathing, and LB dressing)      ADL:  Eating Assistance:  (set up)  Grooming Assistance:  (supervision bed level 2/2 dizziness and fatigue EOB)  Bathing Assistance: Maximal  UE Dressing Assistance: Minimal  LE Dressing Assistance: Total  Toileting Assistance with Device: Total    Activity Tolerance:  Endurance: Tolerates less than 10 min exercise with changes in vital signs (BP fluctuated with positional changes but was not accurate due to inability of pt to relax UE during all readings)    Bed Mobility/Transfers:   Bed Mobility  Bed Mobility: Yes  Bed Mobility 1  Bed Mobility 1: Supine to sitting, Sitting to supine  Level of Assistance 1:  (min/mod supine>sit; max A sit>supine)  Transfers  Transfer:  No  Transfer 1  Transfer From 1: Sit to  Transfer to 1: Stand  Technique 1: Sit to stand  Transfer Device 1: Walker  Transfer Level of Assistance 1: Maximum assistance    Ambulation/Gait Training:  Ambulation/Gait Training  Ambulation/Gait Training Performed: No    Sitting Balance:  Static Sitting Balance  Static Sitting-Balance Support:  (SBA)  Dynamic Sitting Balance  Dynamic Sitting-Balance Support:  (min/mod)    Standing Balance:  Static Standing Balance  Static Standing-Balance Support:  (min/mod)  Dynamic Standing Balance  Dynamic Standing-Balance Support:  (max)    Vision: Vision - Basic Assessment  Visual History: Glaucoma   and Vision - Complex Assessment  Ocular Range of Motion: Within Functional Limits    Sensation:  Light Touch: No apparent deficits    Strength:  Strength Comments: 3-/5 BUE    Perception:  Inattention/Neglect: Appears intact    Coordination:  Movements are Fluid and Coordinated: Yes     Hand Function:  Hand Function  Gross Grasp: Functional    Extremities: RUE   RUE : Within Functional Limits and LUE   LUE: Within Functional Limits    Outcome Measures: [facility] Daily Activity  Putting on and taking off regular lower body clothing: Total  Bathing (including washing, rinsing, drying): A lot  Putting on and taking off regular upper body clothing: A little  Toileting, which includes using toilet, bedpan or urinal: Total  Taking care of personal grooming such as brushing teeth: A little  Eating Meals: A little  Daily Activity - Total Score: 13                    EDUCATION:     Education Documentation  Handouts, taught by Christiano Cunningham OT at 2/24/2024  1:09 PM.  Learner: Patient  Readiness: Acceptance  Method: Explanation, Demonstration  Response: Needs Reinforcement    Body Mechanics, taught by Christiano Cunningham OT at 2/24/2024  1:09 PM.  Learner: Patient  Readiness: Acceptance  Method: Explanation, Demonstration  Response: Needs Reinforcement    Precautions, taught by Christiano  RUDY Cunningham at 2/24/2024  1:09 PM.  Learner: Patient  Readiness: Acceptance  Method: Explanation, Demonstration  Response: Needs Reinforcement    Home Exercise Program, taught by Christiano Cunningham OT at 2/24/2024  1:09 PM.  Learner: Patient  Readiness: Acceptance  Method: Explanation, Demonstration  Response: Needs Reinforcement    ADL Training, taught by Christiano Cunningham OT at 2/24/2024  1:09 PM.  Learner: Patient  Readiness: Acceptance  Method: Explanation, Demonstration  Response: Needs Reinforcement    Education Comments  No comments found.        Therapeutic Intervention     Pt edu on role of OT in the acute care setting. Pt verbalized understanding   Pt edu on the importance of completing ADLs as independently as possible while in the hospital in order to promote activity tolerance, strength, and return to PLOF. Pt verbalized understanding.   Pt edu to use call light and not attempt any functional mobility until staff present. Pt verbalized understanding.   Pt completed above Adls with assist noted including instructions and cuing to promote goal achievement and return to PLOF.     Goals:   Encounter Problems       Encounter Problems (Active)       ADLs       Patient will perform UB and LB bathing  with minimal assist  level of assistance and DME/AE PRN. (Progressing)       Start:  02/24/24    Expected End:  03/07/24            Patient with complete upper body dressing with set-up level of assistance donning and doffing all UE clothes with PRN adaptive equipment while edge of bed  (Progressing)       Start:  02/24/24    Expected End:  03/07/24            Patient with complete lower body dressing with minimal assist  level of assistance donning and doffing all LE clothes  with PRN adaptive equipment while edge of bed  (Progressing)       Start:  02/24/24    Expected End:  03/07/24            Patient will feed self with modified independent level of assistance and using PRN adaptive equipment.  (Progressing)       Start:  02/24/24    Expected End:  03/07/24            Patient will complete daily grooming tasks brushing teeth and washing face/hair with set-up level of assistance and PRN adaptive equipment while edge of bed . (Progressing)       Start:  02/24/24    Expected End:  03/07/24            Patient will complete toileting including hygiene clothing management/hygiene with minimal assist  level of assistance and DME/AE PRN. (Progressing)       Start:  02/24/24    Expected End:  03/07/24               EXERCISE/STRENGTHENING       Patient will complete BUE exercises for 15-20 reps in order to improve strength and activity for ADL performance.  (Progressing)       Start:  02/24/24    Expected End:  03/07/24               Mobility       STG - Patient will ambulate up to 25'x2 with LRD on even surfaces with CGA/Oly x1 and no LOB (Not Progressing)       Start:  02/24/24    Expected End:  03/09/24               Transfers       STG - Transfer from bed to chair using LRD with min A x1 (Progressing)       Start:  02/24/24    Expected End:  03/09/24            STG - Patient will perform bed mobility with CGA x1  (Progressing)       Start:  02/24/24    Expected End:  03/09/24 02/24/24 at 1:10 PM - ELIZABETH PADILLA OT

## 2024-02-24 NOTE — CARE PLAN
Problem: Pain  Goal: My pain/discomfort is manageable  Outcome: Progressing     Problem: Safety  Goal: Patient will be injury free during hospitalization  Outcome: Progressing  Goal: I will remain free of falls  Outcome: Progressing     Problem: Daily Care  Goal: Daily care needs are met  Outcome: Progressing     Problem: Psychosocial Needs  Goal: Demonstrates ability to cope with hospitalization/illness  Outcome: Progressing  Goal: Collaborate with me, my family, and caregiver to identify my specific goals  Outcome: Progressing     Problem: Discharge Barriers  Goal: My discharge needs are met  Outcome: Progressing     Problem: Fall/Injury  Goal: Not fall by end of shift  Outcome: Progressing  Goal: Be free from injury by end of the shift  Outcome: Progressing  Goal: Verbalize understanding of personal risk factors for fall in the hospital  Outcome: Progressing  Goal: Verbalize understanding of risk factor reduction measures to prevent injury from fall in the home  Outcome: Progressing  Goal: Use assistive devices by end of the shift  Outcome: Progressing  Goal: Pace activities to prevent fatigue by end of the shift  Outcome: Progressing     Problem: Skin  Goal: Participates in plan/prevention/treatment measures  Outcome: Progressing  Flowsheets (Taken 2/24/2024 0859)  Participates in plan/prevention/treatment measures: Discuss with provider PT/OT consult  Goal: Prevent/manage excess moisture  Outcome: Progressing  Flowsheets (Taken 2/24/2024 0859)  Prevent/manage excess moisture:   Cleanse incontinence/protect with barrier cream   Monitor for/manage infection if present  Goal: Prevent/minimize sheer/friction injuries  Outcome: Progressing  Flowsheets (Taken 2/24/2024 0859)  Prevent/minimize sheer/friction injuries:   Use pull sheet   HOB 30 degrees or less   Turn/reposition every 2 hours/use positioning/transfer devices  Goal: Promote/optimize nutrition  Outcome: Progressing  Flowsheets (Taken 2/24/2024  0883)  Promote/optimize nutrition:   Monitor/record intake including meals   Assist with feeding   Consume > 50% meals/supplements   Offer water/supplements/favorite foods       The clinical goals for the shift include patient will remain free from falls or injury throughout this shift.

## 2024-02-24 NOTE — PROGRESS NOTES
Andree Johnson is a 88 y.o. female admitted for TIA (transient ischemic attack). Pharmacy reviewed the patient's [id number] medications and allergies for accuracy.    The list below reflects the PTA list prior to pharmacy medication history. A summary a changes to the PTA medication list has been listed below. Please review each medication in order reconciliation for additional clarification and justification.    NO CHANGES    Source of information:    Medications added:    Medications modified:    Medications to be removed:    Medications of concern:      Prior to Admission Medications   Prescriptions Last Dose Informant Patient Reported? Taking?   acetaminophen (Tylenol) 325 mg tablet   Yes No   Sig: Take 2 tablets (650 mg) by mouth every 8 hours if needed for moderate pain (4 - 6). OTC   aspirin 81 mg EC tablet   Yes No   Sig: Take 1 tablet (81 mg) by mouth once daily. OTC   atorvastatin (Lipitor) 80 mg tablet   Yes No   Sig: Take 1 tablet (80 mg) by mouth once daily at bedtime. Dr Gill   blood sugar diagnostic (OneTouch Ultra Test) strip   No No   Si each by Not Applicable route once daily. Which ever brand preferred by patient and insurance coverage   carvedilol (Coreg) 12.5 mg tablet   Yes No   Sig: Take 1 tablet (12.5 mg) by mouth 2 times a day with meals. Dr Gill   cholecalciferol (Vitamin D-3) 125 MCG (5000 UT) capsule   Yes No   Sig: Take by mouth once daily. OTC   clopidogrel (Plavix) 75 mg tablet   Yes No   Sig: Take 1 tablet (75 mg) by mouth once daily. Dr Gill   cyanocobalamin (Vitamin B-12) 1,000 mcg tablet   No No   Sig: Take 1 tablet (1,000 mcg) by mouth once daily. Do not start before 2024.   furosemide (Lasix) 20 mg tablet   No No   Sig: Take 1 tablet (20 mg) by mouth once daily.   glimepiride (Amaryl) 4 mg tablet   No No   Sig: Take 1 tablet (4 mg) by mouth 2 times a day. Dr Gray   linaGLIPtin (Tradjenta) 5 mg tablet   No No   Sig: Take 1 tablet (5 mg) by mouth once  daily. Dr Gray   lisinopril 20 mg tablet   Yes No   Sig: Take 1 tablet (20 mg) by mouth 2 times a day. Dr Gill   ondansetron ODT (Zofran-ODT) 4 mg disintegrating tablet   No No   Sig: Take 1 tablet (4 mg) by mouth every 8 hours if needed for nausea or vomiting.   pioglitazone (Actos) 30 mg tablet   No No   Sig: Take 1 tablet (30 mg) by mouth once daily.   polyethylene glycol (Glycolax) 17 gram/dose powder   Yes No   Sig: Take by mouth. Dr Gill      Facility-Administered Medications: None       Bindu Ley [facility]

## 2024-02-25 LAB
GLUCOSE BLD MANUAL STRIP-MCNC: 121 MG/DL (ref 74–99)
GLUCOSE BLD MANUAL STRIP-MCNC: 157 MG/DL (ref 74–99)
GLUCOSE BLD MANUAL STRIP-MCNC: 254 MG/DL (ref 74–99)
GLUCOSE BLD MANUAL STRIP-MCNC: 339 MG/DL (ref 74–99)

## 2024-02-25 PROCEDURE — 2500000001 HC RX 250 WO HCPCS SELF ADMINISTERED DRUGS (ALT 637 FOR MEDICARE OP): Performed by: PSYCHIATRY & NEUROLOGY

## 2024-02-25 PROCEDURE — 99232 SBSQ HOSP IP/OBS MODERATE 35: CPT | Performed by: INTERNAL MEDICINE

## 2024-02-25 PROCEDURE — 2500000004 HC RX 250 GENERAL PHARMACY W/ HCPCS (ALT 636 FOR OP/ED): Performed by: STUDENT IN AN ORGANIZED HEALTH CARE EDUCATION/TRAINING PROGRAM

## 2024-02-25 PROCEDURE — 99232 SBSQ HOSP IP/OBS MODERATE 35: CPT | Performed by: PSYCHIATRY & NEUROLOGY

## 2024-02-25 PROCEDURE — 2500000001 HC RX 250 WO HCPCS SELF ADMINISTERED DRUGS (ALT 637 FOR MEDICARE OP): Performed by: STUDENT IN AN ORGANIZED HEALTH CARE EDUCATION/TRAINING PROGRAM

## 2024-02-25 PROCEDURE — 1200000002 HC GENERAL ROOM WITH TELEMETRY DAILY

## 2024-02-25 PROCEDURE — 2500000002 HC RX 250 W HCPCS SELF ADMINISTERED DRUGS (ALT 637 FOR MEDICARE OP, ALT 636 FOR OP/ED): Performed by: STUDENT IN AN ORGANIZED HEALTH CARE EDUCATION/TRAINING PROGRAM

## 2024-02-25 PROCEDURE — 82947 ASSAY GLUCOSE BLOOD QUANT: CPT

## 2024-02-25 RX ADMIN — CARVEDILOL 12.5 MG: 12.5 TABLET, FILM COATED ORAL at 16:46

## 2024-02-25 RX ADMIN — ENOXAPARIN SODIUM 40 MG: 40 INJECTION SUBCUTANEOUS at 19:34

## 2024-02-25 RX ADMIN — ATORVASTATIN CALCIUM 80 MG: 40 TABLET, FILM COATED ORAL at 19:34

## 2024-02-25 RX ADMIN — FUROSEMIDE 20 MG: 20 TABLET ORAL at 08:07

## 2024-02-25 RX ADMIN — CYANOCOBALAMIN TAB 1000 MCG 1000 MCG: 1000 TAB at 08:07

## 2024-02-25 RX ADMIN — INSULIN LISPRO 1 UNITS: 100 INJECTION, SOLUTION INTRAVENOUS; SUBCUTANEOUS at 16:46

## 2024-02-25 RX ADMIN — Medication 3 MG: at 19:34

## 2024-02-25 RX ADMIN — INSULIN LISPRO 3 UNITS: 100 INJECTION, SOLUTION INTRAVENOUS; SUBCUTANEOUS at 11:20

## 2024-02-25 RX ADMIN — ASPIRIN 81 MG: 81 TABLET, COATED ORAL at 08:07

## 2024-02-25 RX ADMIN — CLOPIDOGREL 75 MG: 75 TABLET ORAL at 08:07

## 2024-02-25 RX ADMIN — ENOXAPARIN SODIUM 40 MG: 40 INJECTION SUBCUTANEOUS at 08:07

## 2024-02-25 RX ADMIN — CARVEDILOL 12.5 MG: 12.5 TABLET, FILM COATED ORAL at 08:07

## 2024-02-25 RX ADMIN — PANTOPRAZOLE SODIUM 40 MG: 40 TABLET, DELAYED RELEASE ORAL at 06:34

## 2024-02-25 ASSESSMENT — COGNITIVE AND FUNCTIONAL STATUS - GENERAL
DRESSING REGULAR LOWER BODY CLOTHING: TOTAL
MOVING TO AND FROM BED TO CHAIR: A LOT
MOVING FROM LYING ON BACK TO SITTING ON SIDE OF FLAT BED WITH BEDRAILS: A LITTLE
DAILY ACTIVITIY SCORE: 13
WALKING IN HOSPITAL ROOM: TOTAL
HELP NEEDED FOR BATHING: A LOT
MOBILITY SCORE: 11
STANDING UP FROM CHAIR USING ARMS: A LOT
HELP NEEDED FOR BATHING: A LOT
STANDING UP FROM CHAIR USING ARMS: A LOT
MOVING FROM LYING ON BACK TO SITTING ON SIDE OF FLAT BED WITH BEDRAILS: A LITTLE
WALKING IN HOSPITAL ROOM: TOTAL
DAILY ACTIVITIY SCORE: 13
CLIMB 3 TO 5 STEPS WITH RAILING: TOTAL
DRESSING REGULAR UPPER BODY CLOTHING: A LITTLE
MOBILITY SCORE: 11
MOVING TO AND FROM BED TO CHAIR: A LOT
EATING MEALS: A LITTLE
TOILETING: TOTAL
CLIMB 3 TO 5 STEPS WITH RAILING: TOTAL
DRESSING REGULAR LOWER BODY CLOTHING: TOTAL
TOILETING: TOTAL
TURNING FROM BACK TO SIDE WHILE IN FLAT BAD: A LOT
PERSONAL GROOMING: A LITTLE
EATING MEALS: A LITTLE
DRESSING REGULAR UPPER BODY CLOTHING: A LITTLE
PERSONAL GROOMING: A LITTLE
TURNING FROM BACK TO SIDE WHILE IN FLAT BAD: A LOT

## 2024-02-25 ASSESSMENT — PAIN - FUNCTIONAL ASSESSMENT
PAIN_FUNCTIONAL_ASSESSMENT: 0-10
PAIN_FUNCTIONAL_ASSESSMENT: 0-10

## 2024-02-25 ASSESSMENT — PAIN SCALES - GENERAL
PAINLEVEL_OUTOF10: 0 - NO PAIN
PAINLEVEL_OUTOF10: 0 - NO PAIN

## 2024-02-25 NOTE — PROGRESS NOTES
"PROGRESS NOTE    Patient seen this afternoon. No family members around.    Pt was sleeping but easily arousable. Looks more awake when awake. When asked what happened to her, again states she thinks she fell in the facility (her nurse at the facility didn't mention any fall). Pt then told me she wasn't sure.     No new complaints.    Allergies  Adhesive tape-silicones, Metformin, and Shellfish containing products    Current Meds  Scheduled medications  aspirin, 81 mg, oral, Daily  atorvastatin, 80 mg, oral, Nightly  carvedilol, 12.5 mg, oral, BID with meals  clopidogrel, 75 mg, oral, Daily  cyanocobalamin, 1,000 mcg, intramuscular, Daily  cyanocobalamin, 1,000 mcg, oral, Daily  enoxaparin, 40 mg, subcutaneous, q12h [place]  furosemide, 20 mg, oral, Daily  insulin lispro, 0-5 Units, subcutaneous, TID with meals  [Held by provider] lisinopril, 20 mg, oral, BID  melatonin, 3 mg, oral, Daily  pantoprazole, 40 mg, oral, Daily before breakfast   Or  pantoprazole, 40 mg, intravenous, Daily before breakfast      Continuous medications     PRN medications  PRN medications: acetaminophen, bisacodyl, bisacodyl, dextrose 10 % in water (D10W), dextrose, glucagon, guaiFENesin, ondansetron **OR** ondansetron    Last Recorded Vitals  Blood pressure 111/68, pulse 63, temperature 36.7 °C (98.1 °F), temperature source Temporal, resp. rate 18, height 1.575 m (5' 2\"), weight 108 kg (239 lb), SpO2 98 %.    Neuro Exam  Awake, alert, oriented, in no distress  Patient able to answer simple questions and follow simple commands  Well-nourished, in bed      Supple neck      Mental status exam as above, conversant   No facial droop   Hearing grossly intact   No dysarthria     Motor strength is at least antigravity on all extremities  I did not have her stand or walk    Relevant Results  I have personally reviewed the following:    Labs  Results for orders placed or performed during the hospital encounter of 02/23/24 (from the past 24 hour(s)) "   POCT GLUCOSE   Result Value Ref Range    POCT Glucose 93 74 - 99 mg/dL   POCT GLUCOSE   Result Value Ref Range    POCT Glucose 191 (H) 74 - 99 mg/dL   POCT GLUCOSE   Result Value Ref Range    POCT Glucose 121 (H) 74 - 99 mg/dL   POCT GLUCOSE   Result Value Ref Range    POCT Glucose 254 (H) 74 - 99 mg/dL       Imaging results  === 02/23/24 ===    CT BRAIN ATTACK HEAD WO CONTRAST    - Impression -  No acute intracranial hemorrhage, mass effect, or CT apparent acute  infarct.    Mild-to-moderate chronic small vessel ischemic disease and  generalized brain atrophy.    MACRO:  Mack Martínez discussed the significance and urgency of this critical  finding by telephone with  LIZZ FAIRCHILD on 2/23/2024 at 12:53 pm.  (**-RCF-**) Findings:  See findings.        Signed by: Mack Martínez 2/23/2024 12:54 PM  Dictation workstation:   [id number]   === 02/23/24 ===    MR NECK ANGIO WO IV CONTRAST    - Impression -  1. There are multifocal areas of markedly diminished flow related  signal intensity of the anterior and posterior circulation on MRA of  the head, this may be due in part to artifact although areas of  stenosis and markedly diminished flow are suspected.  2. Somewhat motion degraded MRA of the neck. There is diminished  intensity of flow related signal along the right vertebral artery in  the neck suggesting decreased flow. There is loss of flow related  signal along the distal V3 and proximal V4 segments with  reconstitution of the mid and distal V4 segment. Which could reflect  stenosis, markedly decreased flow, or partial occlusion.    MACRO:  None    Signed by: Radha Cornell 2/24/2024 7:07 AM  Dictation workstation:   [id number]       Assessment/Plan  1.  Alteration of consciousness  2.  Left-sided weakness  Exam non-focal--though pt doesn't remember what happened  No reported seizure-like activity  DDx: ? TIA, syncopal episode, less likely seizure  MRI brain with no acute stroke, there are some nonspecific  subcortical white matter disease bilaterally  Patient already on aspirin/clopidogrel/statin     3.  Cerebrovascular disease  4.  Right vertebral artery stenosis/occlusion (age-indeterminate, no acute stroke)  MRA with some non-specific findings  Note that cerebrovascular imaging done 2018 seems to indicate insignificant findings  Improve vascular risk factor control    5.  Left parotid mass  Incidental finding on MRI  Defer to primary team     6.  History of B12 deficiency  Repeat is 248 despite oral B12 supplementation     7.  Diabetes     Cardiac work-up for poss syncope, otherwise treat as poss TIA        Recommendations:  Continue ASA 81mg daily (with food) and clopidogrel 75 mg daily  Continue atorvastatin   Improve control of vascular risk factors specifically diabetes  4.   Cardiac work-up for poss syncope--defer to primary team  5.   Continue B12 supplementation c/o primary team--? consider IM B12? I discontinue oral today  6.   Consider outpatient eval of parotid mass if needed  7.  PT eval  8.  EEG tomorrow--ordered     Discussed with pt. Message sent to Dr Carroll.    All questions answered. Please call with questions.    Dr Rodriguez will assume inpatient neurology consult service on 2/26/24. Please call if with questions.    Brandon Godinez MD  2/25/2024  1:55 PM

## 2024-02-25 NOTE — CARE PLAN
The clinical goals for the shift include patient will remain free from falls or injury throughout this shift.      Problem: Pain  Goal: My pain/discomfort is manageable  Outcome: Progressing     Problem: Safety  Goal: Patient will be injury free during hospitalization  Outcome: Progressing  Goal: I will remain free of falls  Outcome: Progressing     Problem: Daily Care  Goal: Daily care needs are met  Outcome: Progressing     Problem: Psychosocial Needs  Goal: Demonstrates ability to cope with hospitalization/illness  Outcome: Progressing  Goal: Collaborate with me, my family, and caregiver to identify my specific goals  Outcome: Progressing     Problem: Discharge Barriers  Goal: My discharge needs are met  Outcome: Progressing     Problem: Fall/Injury  Goal: Not fall by end of shift  Outcome: Progressing  Goal: Be free from injury by end of the shift  Outcome: Progressing  Goal: Verbalize understanding of personal risk factors for fall in the hospital  Outcome: Progressing  Goal: Verbalize understanding of risk factor reduction measures to prevent injury from fall in the home  Outcome: Progressing  Goal: Use assistive devices by end of the shift  Outcome: Progressing  Goal: Pace activities to prevent fatigue by end of the shift  Outcome: Progressing     Problem: Skin  Goal: Participates in plan/prevention/treatment measures  Outcome: Progressing  Goal: Prevent/manage excess moisture  Outcome: Progressing  Goal: Prevent/minimize sheer/friction injuries  Outcome: Progressing  Goal: Promote/optimize nutrition  Outcome: Progressing     Problem: Pain - Adult  Goal: Verbalizes/displays adequate comfort level or baseline comfort level  Outcome: Progressing     Problem: Safety - Adult  Goal: Free from fall injury  Outcome: Progressing     Problem: Discharge Planning  Goal: Discharge to home or other facility with appropriate resources  Outcome: Progressing     Problem: Chronic Conditions and Co-morbidities  Goal:  Patient's chronic conditions and co-morbidity symptoms are monitored and maintained or improved  Outcome: Progressing     Problem: Diabetes  Goal: Achieve decreasing blood glucose levels by end of shift  Outcome: Progressing  Goal: Increase stability of blood glucose readings by end of shift  Outcome: Progressing  Goal: Decrease in ketones present in urine by end of shift  Outcome: Progressing  Goal: Maintain electrolyte levels within acceptable range throughout shift  Outcome: Progressing  Goal: Maintain glucose levels >70mg/dl to <250mg/dl throughout shift  Outcome: Progressing  Goal: No changes in neurological exam by end of shift  Outcome: Progressing  Goal: Learn about and adhere to nutrition recommendations by end of shift  Outcome: Progressing  Goal: Vital signs within normal range for age by end of shift  Outcome: Progressing  Goal: Increase self care and/or family involovement by end of shift  Outcome: Progressing  Goal: Receive DSME education by end of shift  Outcome: Progressing

## 2024-02-25 NOTE — PROGRESS NOTES
Andree Johnson is a 88 y.o. female on day 2 of admission presenting with TIA (transient ischemic attack).      Subjective   Andree Johnson is a 88 y.o. female with PMHx s/f CAD, cardiomyopathy, DM2, HTN, HLD presenting with stroke-like symptoms at the nursing home during OT today. Pt was recently admitted to [facility] earlier this month for falls and was discharged to SNF on 2/2/24. Pt reportedly had an episode during her therapy this morning where she became less responsive and complained her left side hurt. There was no reported fall, syncope, or slurred speech. Pt herself is a poor historian at baseline and does not remember the incident. No staff available from the facility currently. After the incident EMS was called and she was taken to the ER. Symptoms have reportedly resolved at this time. Pt has no complaints and follows all commands. She denies chest pain, nausea, vomiting, vision changes, lightheadedness, dizziness, or syncope     ED Course (Summary):   Vitals on presentation: 97.8 F, 60 bpm, 18 rr, 105/70  Labs: CMP glu 2216 BUN 26, total bilirubin 1.3, trop 9, INR 1.1, CBC WBC 6.0 Hg 11.7 Plt 159, UA benign  Imaging: CT brain attack by my interpretation - no acute hemorrhage or process, chronic small vessel ischemic disease  Interventions: Admit for further workup     2/24/2024: no acute events overnight. No focal weakness or numbness.   No acute stroke per MRI of brain. PT/OT. Risk modifications per neurology    2/25/2024: no acute events overnight. MRI revealed no acute stroke. Patient is waitinf for placement for rehab.       Objective     Last Recorded Vitals  /72 (BP Location: Left arm, Patient Position: Lying)   Pulse 64   Temp 36.2 °C (97.1 °F) (Temporal)   Resp 16   Wt 108 kg (239 lb)   SpO2 97%   Intake/Output last 3 Shifts:    Intake/Output Summary (Last 24 hours) at 2/25/2024 1113  Last data filed at 2/25/2024 0716  Gross per 24 hour   Intake 480 ml   Output 150 ml   Net 330  ml       Admission Weight  Weight: 108 kg (239 lb) (02/23/24 1222)    Daily Weight  02/23/24 : 108 kg (239 lb)    Image Results  MR angio head wo IV contrast, MR angio neck wo IV contrast  Narrative: Interpreted By:  Radha Cornell,   STUDY:  MR ANGIO HEAD WO IV CONTRAST; MR ANGIO NECK WO IV CONTRAST;  2/23/2024 6:41 pm      INDICATION:  Signs/Symptoms:unresponsiveness, left sided weakness;  Signs/Symptoms:unresponsiveness, left-sided weakness.      COMPARISON:  MRI of the brain performed earlier on the same date      ACCESSION NUMBER(S):  [id number]; [id number]      ORDERING CLINICIAN:  LIBRA EDGAR      TECHNIQUE:  Time-of-flight MRA of the head and neck was performed. The images  were reviewed as source images and maximum intensity projections.      FINDINGS:  MRA neck:      The images are degraded by artifact and patient motion.      Carotid vessels:      Irregularity and attenuation of flow related signal along the common  carotid arteries, right greater than left likely is due at least in  part to artifact. There is irregularity and attenuation of flow  related signal at the bifurcations and origins of the ICAs, right  greater than left likely due to a combination of artifact and  atherosclerotic involvement. There is no measurable luminal narrowing  on the left. On the right there is narrowing of less than 30%  relative to the caliber of the ICA more distally. The ICAs are  tortuous bilaterally and there is attenuated flow related signal in  the regions of tortuosity favored to be due to artifact.      Vertebral vessels:      The left vertebral artery is dominant. There is multifocal  attenuation of flow related signal particularly on the right which  may be due to a combination of artifact and potential areas of  stenosis.      MRA head:      The images are somewhat degraded by artifact and patient motion.      Anterior circulation:  There is some irregularity and attenuation of  flow related signal  along the intracranial internal carotid arteries  likely due to a combination of artifact and atherosclerotic plaque.  There is narrowing of the supraclinoid segments bilaterally. There is  attenuation of flow related signal along the distal M1 segments of  the MCAs and involving the remainder of the M2 and M3 segments  bilaterally. There is also attenuated flow related signal along A1  and proximal A2 segments of the ACAs.      Posterior circulation:    The left vertebral artery is dominant. The  V4 segment on the right is diminutive in caliber and the distal V3  and proximal V4 segments demonstrate markedly diminished intensity of  flow related signal. The basilar artery is patent. There is decreased  flow related signal along bilateral PCAs which are not well  visualized beyond the junction of the P1 and P2 segments.      Impression: 1. There are multifocal areas of markedly diminished flow related  signal intensity of the anterior and posterior circulation on MRA of  the head, this may be due in part to artifact although areas of  stenosis and markedly diminished flow are suspected.  2. Somewhat motion degraded MRA of the neck. There is diminished  intensity of flow related signal along the right vertebral artery in  the neck suggesting decreased flow. There is loss of flow related  signal along the distal V3 and proximal V4 segments with  reconstitution of the mid and distal V4 segment. Which could reflect  stenosis, markedly decreased flow, or partial occlusion.      MACRO:  None      Signed by: Radha Cornell 2/24/2024 7:07 AM  Dictation workstation:   [id number]      Physical Exam  Constitutional:       General: She is not in acute distress.     Appearance: Normal appearance.   HENT:      Head: Normocephalic.      Mouth/Throat:      Mouth: Mucous membranes are moist.      Pharynx: Oropharynx is clear.   Eyes:      Pupils: Pupils are equal, round, and reactive to light.   Cardiovascular:      Rate and  Rhythm: Normal rate and regular rhythm.      Heart sounds: Normal heart sounds. No murmur heard.     No gallop.   Pulmonary:      Effort: Pulmonary effort is normal.      Breath sounds: Normal breath sounds.   Abdominal:      General: Bowel sounds are normal. There is no distension.      Palpations: Abdomen is soft.      Tenderness: There is no abdominal tenderness.   Musculoskeletal:         General: No swelling. Normal range of motion.      Cervical back: Neck supple. No rigidity.   Skin:     General: Skin is warm and dry.   Neurological:      General: No focal deficit present.      Mental Status: She is alert.      Cranial Nerves: No cranial nerve deficit.      Sensory: No sensory deficit.   Psychiatric:         Mood and Affect: Mood normal.         Behavior: Behavior normal.         Relevant Results             Scheduled medications  aspirin, 81 mg, oral, Daily  atorvastatin, 80 mg, oral, Nightly  carvedilol, 12.5 mg, oral, BID with meals  clopidogrel, 75 mg, oral, Daily  cyanocobalamin, 1,000 mcg, intramuscular, Daily  cyanocobalamin, 1,000 mcg, oral, Daily  enoxaparin, 40 mg, subcutaneous, q12h [place]  furosemide, 20 mg, oral, Daily  insulin lispro, 0-5 Units, subcutaneous, TID with meals  [Held by provider] lisinopril, 20 mg, oral, BID  melatonin, 3 mg, oral, Daily  pantoprazole, 40 mg, oral, Daily before breakfast   Or  pantoprazole, 40 mg, intravenous, Daily before breakfast      Continuous medications     PRN medications  PRN medications: acetaminophen, bisacodyl, bisacodyl, dextrose 10 % in water (D10W), dextrose, glucagon, guaiFENesin, ondansetron **OR** ondansetron  Results for orders placed or performed during the hospital encounter of 02/23/24 (from the past 96 hour(s))   POCT GLUCOSE   Result Value Ref Range    POCT Glucose 196 (H) 74 - 99 mg/dL   CBC and Auto Differential   Result Value Ref Range    WBC 6.0 4.4 - 11.3 x10*3/uL    nRBC 0.0 0.0 - 0.0 /100 WBCs    RBC 3.72 (L) 4.00 - 5.20 x10*6/uL     Hemoglobin 11.7 (L) 12.0 - 16.0 g/dL    Hematocrit 36.9 36.0 - 46.0 %    MCV 99 80 - 100 fL    MCH 31.5 26.0 - 34.0 pg    MCHC 31.7 (L) 32.0 - 36.0 g/dL    RDW 14.1 11.5 - 14.5 %    Platelets 159 150 - 450 x10*3/uL    Neutrophils % 62.7 40.0 - 80.0 %    Immature Granulocytes %, Automated 0.5 0.0 - 0.9 %    Lymphocytes % 23.2 13.0 - 44.0 %    Monocytes % 8.8 2.0 - 10.0 %    Eosinophils % 4.3 0.0 - 6.0 %    Basophils % 0.5 0.0 - 2.0 %    Neutrophils Absolute 3.78 1.60 - 5.50 x10*3/uL    Immature Granulocytes Absolute, Automated 0.03 0.00 - 0.50 x10*3/uL    Lymphocytes Absolute 1.40 0.80 - 3.00 x10*3/uL    Monocytes Absolute 0.53 0.05 - 0.80 x10*3/uL    Eosinophils Absolute 0.26 0.00 - 0.40 x10*3/uL    Basophils Absolute 0.03 0.00 - 0.10 x10*3/uL   Troponin I, High Sensitivity   Result Value Ref Range    Troponin I, High Sensitivity 9 0 - 13 ng/L   Protime-INR   Result Value Ref Range    Protime 12.7 9.8 - 12.8 seconds    INR 1.1 0.9 - 1.1   APTT   Result Value Ref Range    aPTT 27 27 - 38 seconds   Basic metabolic panel   Result Value Ref Range    Glucose 216 (H) 74 - 99 mg/dL    Sodium 136 136 - 145 mmol/L    Potassium 4.5 3.5 - 5.3 mmol/L    Chloride 103 98 - 107 mmol/L    Bicarbonate 28 21 - 32 mmol/L    Anion Gap 10 10 - 20 mmol/L    Urea Nitrogen 26 (H) 6 - 23 mg/dL    Creatinine 0.90 0.50 - 1.05 mg/dL    eGFR 62 >60 mL/min/1.73m*2    Calcium 8.4 (L) 8.6 - 10.3 mg/dL   Hepatic function panel   Result Value Ref Range    Albumin 3.0 (L) 3.4 - 5.0 g/dL    Bilirubin, Total 1.3 (H) 0.0 - 1.2 mg/dL    Bilirubin, Direct 0.2 0.0 - 0.3 mg/dL    Alkaline Phosphatase 70 33 - 136 U/L    ALT 14 7 - 45 U/L    AST 18 9 - 39 U/L    Total Protein 5.5 (L) 6.4 - 8.2 g/dL   Vitamin B12   Result Value Ref Range    Vitamin B12 248 211 - 911 pg/mL   Urinalysis with Reflex Culture and Microscopic   Result Value Ref Range    Color, Urine Yellow Straw, Yellow    Appearance, Urine Clear Clear    Specific Gravity, Urine 1.009 1.005 - 1.035     pH, Urine 7.0 5.0, 5.5, 6.0, 6.5, 7.0, 7.5, 8.0    Protein, Urine NEGATIVE NEGATIVE mg/dL    Glucose, Urine NEGATIVE NEGATIVE mg/dL    Blood, Urine NEGATIVE NEGATIVE    Ketones, Urine NEGATIVE NEGATIVE mg/dL    Bilirubin, Urine NEGATIVE NEGATIVE    Urobilinogen, Urine <2.0 <2.0 mg/dL    Nitrite, Urine NEGATIVE NEGATIVE    Leukocyte Esterase, Urine NEGATIVE NEGATIVE   Extra Urine Gray Tube   Result Value Ref Range    Extra Tube Hold for add-ons.    POCT GLUCOSE   Result Value Ref Range    POCT Glucose 258 (H) 74 - 99 mg/dL   Hemoglobin A1C   Result Value Ref Range    Hemoglobin A1C 7.9 (H) see below %    Estimated Average Glucose 180 Not Established mg/dL   CBC   Result Value Ref Range    WBC 4.7 4.4 - 11.3 x10*3/uL    nRBC 0.0 0.0 - 0.0 /100 WBCs    RBC 3.45 (L) 4.00 - 5.20 x10*6/uL    Hemoglobin 11.1 (L) 12.0 - 16.0 g/dL    Hematocrit 33.3 (L) 36.0 - 46.0 %    MCV 97 80 - 100 fL    MCH 32.2 26.0 - 34.0 pg    MCHC 33.3 32.0 - 36.0 g/dL    RDW 13.9 11.5 - 14.5 %    Platelets 128 (L) 150 - 450 x10*3/uL   Basic metabolic panel   Result Value Ref Range    Glucose 125 (H) 74 - 99 mg/dL    Sodium 139 136 - 145 mmol/L    Potassium 3.9 3.5 - 5.3 mmol/L    Chloride 107 98 - 107 mmol/L    Bicarbonate 25 21 - 32 mmol/L    Anion Gap 11 10 - 20 mmol/L    Urea Nitrogen 29 (H) 6 - 23 mg/dL    Creatinine 0.76 0.50 - 1.05 mg/dL    eGFR 76 >60 mL/min/1.73m*2    Calcium 7.7 (L) 8.6 - 10.3 mg/dL   LDL Cholesterol, Direct   Result Value Ref Range    LDL, Direct 31 0 - 129 mg/dL   POCT GLUCOSE   Result Value Ref Range    POCT Glucose 110 (H) 74 - 99 mg/dL   POCT GLUCOSE   Result Value Ref Range    POCT Glucose 225 (H) 74 - 99 mg/dL   POCT GLUCOSE   Result Value Ref Range    POCT Glucose 93 74 - 99 mg/dL   POCT GLUCOSE   Result Value Ref Range    POCT Glucose 191 (H) 74 - 99 mg/dL   POCT GLUCOSE   Result Value Ref Range    POCT Glucose 121 (H) 74 - 99 mg/dL       Assessment/Plan                  Principal Problem:    TIA (transient  ischemic attack)  Active Problems:    CAD in native artery    Diabetes mellitus, type 2 (CMS/HCC)    Hyperlipidemia    Hypertension, benign    Hyperglycemia    Normocytic anemia    Elevated bilirubin    1. TIA (transient ischemic attack)      no acute stroke, risk modifcations, wait for placement for rehab      2. Hypertension, benign      continue home meds      3. CAD in native artery        4. Cardiomyopathy, unspecified type (CMS/HCC)      continue tom Carroll MD

## 2024-02-25 NOTE — CARE PLAN
Problem: Pain  Goal: My pain/discomfort is manageable  Outcome: Progressing     Problem: Safety  Goal: Patient will be injury free during hospitalization  Outcome: Progressing  Goal: I will remain free of falls  Outcome: Progressing     Problem: Daily Care  Goal: Daily care needs are met  Outcome: Progressing     Problem: Psychosocial Needs  Goal: Demonstrates ability to cope with hospitalization/illness  Outcome: Progressing  Goal: Collaborate with me, my family, and caregiver to identify my specific goals  Outcome: Progressing     Problem: Discharge Barriers  Goal: My discharge needs are met  Outcome: Progressing     Problem: Fall/Injury  Goal: Not fall by end of shift  Outcome: Progressing  Goal: Be free from injury by end of the shift  Outcome: Progressing  Goal: Verbalize understanding of personal risk factors for fall in the hospital  Outcome: Progressing  Goal: Verbalize understanding of risk factor reduction measures to prevent injury from fall in the home  Outcome: Progressing  Goal: Use assistive devices by end of the shift  Outcome: Progressing  Goal: Pace activities to prevent fatigue by end of the shift  Outcome: Progressing     Problem: Skin  Goal: Participates in plan/prevention/treatment measures  Outcome: Progressing  Goal: Prevent/manage excess moisture  Outcome: Progressing  Goal: Prevent/minimize sheer/friction injuries  Outcome: Progressing  Goal: Promote/optimize nutrition  2/25/2024 0232 by Coral Hardy RN  Flowsheets (Taken 2/24/2024 0859 by Rajwinder Templeton RN)  Promote/optimize nutrition:   Monitor/record intake including meals   Assist with feeding   Consume > 50% meals/supplements   Offer water/supplements/favorite foods  2/25/2024 0224 by Coral Hardy RN  Outcome: Progressing     Problem: Pain - Adult  Goal: Verbalizes/displays adequate comfort level or baseline comfort level  Outcome: Progressing     Problem: Safety - Adult  Goal: Free from fall injury  Outcome: Progressing      Problem: Discharge Planning  Goal: Discharge to home or other facility with appropriate resources  Outcome: Progressing     Problem: Chronic Conditions and Co-morbidities  Goal: Patient's chronic conditions and co-morbidity symptoms are monitored and maintained or improved  Outcome: Progressing     Problem: Diabetes  Goal: Achieve decreasing blood glucose levels by end of shift  Outcome: Progressing  Goal: Increase stability of blood glucose readings by end of shift  Outcome: Progressing  Goal: Decrease in ketones present in urine by end of shift  Outcome: Progressing  Goal: Maintain electrolyte levels within acceptable range throughout shift  Outcome: Progressing  Goal: Maintain glucose levels >70mg/dl to <250mg/dl throughout shift  Outcome: Progressing  Goal: No changes in neurological exam by end of shift  Outcome: Progressing  Goal: Learn about and adhere to nutrition recommendations by end of shift  Outcome: Progressing  Goal: Vital signs within normal range for age by end of shift  Outcome: Progressing  Goal: Increase self care and/or family involovement by end of shift  Outcome: Progressing  Goal: Receive DSME education by end of shift  Outcome: Progressing       The clinical goals for the shift include patient will have hemodynamic stability throughout shift

## 2024-02-25 NOTE — CARE PLAN
Problem: Pain  Goal: My pain/discomfort is manageable  Outcome: Progressing     Problem: Safety  Goal: Patient will be injury free during hospitalization  Outcome: Progressing  Goal: I will remain free of falls  Outcome: Progressing     Problem: Daily Care  Goal: Daily care needs are met  Outcome: Progressing     Problem: Psychosocial Needs  Goal: Demonstrates ability to cope with hospitalization/illness  Outcome: Progressing  Goal: Collaborate with me, my family, and caregiver to identify my specific goals  Outcome: Progressing     Problem: Discharge Barriers  Goal: My discharge needs are met  Outcome: Progressing     Problem: Fall/Injury  Goal: Not fall by end of shift  Outcome: Progressing  Goal: Be free from injury by end of the shift  Outcome: Progressing  Goal: Verbalize understanding of personal risk factors for fall in the hospital  Outcome: Progressing  Goal: Verbalize understanding of risk factor reduction measures to prevent injury from fall in the home  Outcome: Progressing  Goal: Use assistive devices by end of the shift  Outcome: Progressing  Goal: Pace activities to prevent fatigue by end of the shift  Outcome: Progressing     Problem: Skin  Goal: Participates in plan/prevention/treatment measures  Outcome: Progressing  Flowsheets (Taken 2/25/2024 1132)  Participates in plan/prevention/treatment measures: Discuss with provider PT/OT consult  Goal: Prevent/manage excess moisture  Outcome: Progressing  Flowsheets (Taken 2/25/2024 1132)  Prevent/manage excess moisture: Cleanse incontinence/protect with barrier cream  Goal: Prevent/minimize sheer/friction injuries  Outcome: Progressing  Flowsheets (Taken 2/25/2024 1132)  Prevent/minimize sheer/friction injuries:   Use pull sheet   HOB 30 degrees or less   Turn/reposition every 2 hours/use positioning/transfer devices  Goal: Promote/optimize nutrition  Outcome: Progressing     Problem: Pain - Adult  Goal: Verbalizes/displays adequate comfort level or  baseline comfort level  Outcome: Progressing     Problem: Safety - Adult  Goal: Free from fall injury  Outcome: Progressing     Problem: Discharge Planning  Goal: Discharge to home or other facility with appropriate resources  Outcome: Progressing     Problem: Chronic Conditions and Co-morbidities  Goal: Patient's chronic conditions and co-morbidity symptoms are monitored and maintained or improved  Outcome: Progressing       The clinical goals for the shift include patient will have no injuries or falls throughout this shift.

## 2024-02-26 ENCOUNTER — HOSPITAL ENCOUNTER (INPATIENT)
Dept: NEUROLOGY | Facility: HOSPITAL | Age: 89
Discharge: HOME | DRG: 069 | End: 2024-02-26
Payer: MEDICARE

## 2024-02-26 VITALS
OXYGEN SATURATION: 95 % | TEMPERATURE: 97.1 F | SYSTOLIC BLOOD PRESSURE: 104 MMHG | DIASTOLIC BLOOD PRESSURE: 57 MMHG | HEART RATE: 65 BPM | WEIGHT: 239 LBS | HEIGHT: 62 IN | RESPIRATION RATE: 18 BRPM | BODY MASS INDEX: 43.98 KG/M2

## 2024-02-26 LAB
ATRIAL RATE: 58 BPM
GLUCOSE BLD MANUAL STRIP-MCNC: 121 MG/DL (ref 74–99)
GLUCOSE BLD MANUAL STRIP-MCNC: 184 MG/DL (ref 74–99)
P AXIS: 57 DEGREES
PR INTERVAL: 193 MS
Q ONSET: 253 MS
QRS COUNT: 9 BEATS
QRS DURATION: 141 MS
QT INTERVAL: 504 MS
QTC CALCULATION(BAZETT): 496 MS
QTC FREDERICIA: 498 MS
R AXIS: -36 DEGREES
T AXIS: 85 DEGREES
T OFFSET: 505 MS
VENTRICULAR RATE: 58 BPM

## 2024-02-26 PROCEDURE — 99239 HOSP IP/OBS DSCHRG MGMT >30: CPT | Performed by: INTERNAL MEDICINE

## 2024-02-26 PROCEDURE — 95819 EEG AWAKE AND ASLEEP: CPT | Performed by: STUDENT IN AN ORGANIZED HEALTH CARE EDUCATION/TRAINING PROGRAM

## 2024-02-26 PROCEDURE — 82947 ASSAY GLUCOSE BLOOD QUANT: CPT

## 2024-02-26 PROCEDURE — 2500000001 HC RX 250 WO HCPCS SELF ADMINISTERED DRUGS (ALT 637 FOR MEDICARE OP): Performed by: STUDENT IN AN ORGANIZED HEALTH CARE EDUCATION/TRAINING PROGRAM

## 2024-02-26 PROCEDURE — 95819 EEG AWAKE AND ASLEEP: CPT

## 2024-02-26 PROCEDURE — 2500000001 HC RX 250 WO HCPCS SELF ADMINISTERED DRUGS (ALT 637 FOR MEDICARE OP): Performed by: INTERNAL MEDICINE

## 2024-02-26 PROCEDURE — 2500000004 HC RX 250 GENERAL PHARMACY W/ HCPCS (ALT 636 FOR OP/ED): Performed by: STUDENT IN AN ORGANIZED HEALTH CARE EDUCATION/TRAINING PROGRAM

## 2024-02-26 PROCEDURE — 2500000002 HC RX 250 W HCPCS SELF ADMINISTERED DRUGS (ALT 637 FOR MEDICARE OP, ALT 636 FOR OP/ED): Performed by: STUDENT IN AN ORGANIZED HEALTH CARE EDUCATION/TRAINING PROGRAM

## 2024-02-26 RX ORDER — LACTULOSE 10 G/15ML
30 SOLUTION ORAL ONCE
Status: COMPLETED | OUTPATIENT
Start: 2024-02-26 | End: 2024-02-26

## 2024-02-26 RX ORDER — CYANOCOBALAMIN 1000 UG/ML
1000 INJECTION, SOLUTION INTRAMUSCULAR; SUBCUTANEOUS
Qty: 1 ML | Refills: 11 | Status: SHIPPED | OUTPATIENT
Start: 2024-02-26 | End: 2025-02-25

## 2024-02-26 RX ADMIN — CARVEDILOL 12.5 MG: 12.5 TABLET, FILM COATED ORAL at 08:41

## 2024-02-26 RX ADMIN — ASPIRIN 81 MG: 81 TABLET, COATED ORAL at 08:41

## 2024-02-26 RX ADMIN — CYANOCOBALAMIN 1000 MCG: 1000 INJECTION, SOLUTION INTRAMUSCULAR; SUBCUTANEOUS at 08:42

## 2024-02-26 RX ADMIN — ENOXAPARIN SODIUM 40 MG: 40 INJECTION SUBCUTANEOUS at 08:42

## 2024-02-26 RX ADMIN — INSULIN LISPRO 1 UNITS: 100 INJECTION, SOLUTION INTRAVENOUS; SUBCUTANEOUS at 11:24

## 2024-02-26 RX ADMIN — PANTOPRAZOLE SODIUM 40 MG: 40 TABLET, DELAYED RELEASE ORAL at 05:54

## 2024-02-26 RX ADMIN — CLOPIDOGREL 75 MG: 75 TABLET ORAL at 08:41

## 2024-02-26 RX ADMIN — FUROSEMIDE 20 MG: 20 TABLET ORAL at 08:41

## 2024-02-26 RX ADMIN — LACTULOSE 30 G: 20 SOLUTION ORAL at 11:24

## 2024-02-26 ASSESSMENT — COGNITIVE AND FUNCTIONAL STATUS - GENERAL
MOBILITY SCORE: 11
TURNING FROM BACK TO SIDE WHILE IN FLAT BAD: A LOT
DRESSING REGULAR LOWER BODY CLOTHING: A LOT
HELP NEEDED FOR BATHING: A LOT
TOILETING: TOTAL
DAILY ACTIVITIY SCORE: 13
PERSONAL GROOMING: A LITTLE
EATING MEALS: A LITTLE
WALKING IN HOSPITAL ROOM: TOTAL
MOVING TO AND FROM BED TO CHAIR: A LOT
DRESSING REGULAR UPPER BODY CLOTHING: A LOT
STANDING UP FROM CHAIR USING ARMS: A LOT
MOVING FROM LYING ON BACK TO SITTING ON SIDE OF FLAT BED WITH BEDRAILS: A LITTLE
CLIMB 3 TO 5 STEPS WITH RAILING: TOTAL

## 2024-02-26 NOTE — PROGRESS NOTES
Social work consult placed for discharge planning. SW reviewed pt's chart and communicated with [facility]. No SW needs foreseen at this time. SW signing off; available upon request.     Mick Rico, MSW, LSW ([id number])   Care Transitions

## 2024-02-26 NOTE — PROGRESS NOTES
Occupational Therapy                 Therapy Communication Note    Patient Name: Andree Johnson  MRN: [id number]  Today's Date: 2/26/2024     Discipline: Occupational Therapy     Missed Visit Reason: Patient in a medical procedure    Pt. having EEG, will attempt later.

## 2024-02-26 NOTE — PROGRESS NOTES
Pt is cleared for DC.  Transport has been arranged by [facility] for 1430. Rn, Rajwinder, has been made aware via secure chat, facility phone number also provided for report.  Will call family.    1330 Pts Daughter, Maye, notified of transport time.

## 2024-02-26 NOTE — DISCHARGE SUMMARY
Discharge Diagnosis  TIA (transient ischemic attack)    Issues Requiring Follow-Up  Follow-up with primary care provider after discharge  We are discontinuing your lisinopril due to blood pressure being borderline low.  This can be reinitiated once blood pressure has improved at a lower dose    Discharge Meds     Your medication list        CHANGE how you take these medications        Instructions Last Dose Given Next Dose Due   cyanocobalamin 1,000 mcg tablet  Commonly known as: Vitamin B-12  What changed: Another medication with the same name was added. Make sure you understand how and when to take each.      Take 1 tablet (1,000 mcg) by mouth once daily. Do not start before February 1, 2024.       cyanocobalamin 1,000 mcg/mL injection  Commonly known as: Vitamin B-12  What changed: You were already taking a medication with the same name, and this prescription was added. Make sure you understand how and when to take each.      Inject 1 mL (1,000 mcg) into the muscle every 30 (thirty) days.              CONTINUE taking these medications        Instructions Last Dose Given Next Dose Due   acetaminophen 325 mg tablet  Commonly known as: Tylenol           aspirin 81 mg EC tablet           atorvastatin 80 mg tablet  Commonly known as: Lipitor           carvedilol 12.5 mg tablet  Commonly known as: Coreg           cholecalciferol 125 MCG (5000 UT) capsule  Commonly known as: Vitamin D-3           clopidogrel 75 mg tablet  Commonly known as: Plavix           furosemide 20 mg tablet  Commonly known as: Lasix      Take 1 tablet (20 mg) by mouth once daily.       glimepiride 4 mg tablet  Commonly known as: Amaryl      Take 1 tablet (4 mg) by mouth 2 times a day. Dr Gray       ondansetron ODT 4 mg disintegrating tablet  Commonly known as: Zofran-ODT      Take 1 tablet (4 mg) by mouth every 8 hours if needed for nausea or vomiting.       OneTouch Ultra Test strip  Generic drug: blood sugar diagnostic      1 each by Not  Applicable route once daily. Which ever brand preferred by patient and insurance coverage       pioglitazone 30 mg tablet  Commonly known as: Actos      Take 1 tablet (30 mg) by mouth once daily.       polyethylene glycol 17 gram/dose powder  Commonly known as: Glycolax, Miralax           Tradjenta 5 mg tablet  Generic drug: linaGLIPtin      Take 1 tablet (5 mg) by mouth once daily. Dr Gray              STOP taking these medications      lisinopril 20 mg tablet                  Where to Get Your Medications        These medications were sent to [id number] - [facility] [address]  [address] [address]      Phone: [phone]   cyanocobalamin 1,000 mcg/mL injection         Test Results Pending At Discharge  Pending Labs       No current pending labs.            Hospital Course  88-year-old female with past medical history of CAD, cardiomyopathy, diabetes mellitus, hypertension, hyperlipidemia presented for strokelike symptoms.  Patient was admitted and worked up.  Neurology evaluated patient and patient had imaging done with MRI which was negative for any acute stroke.  There were some nonspecific subcortical white matter disease bilaterally.  Patient is already on therapy with aspirin, Plavix and statin.  Patient also had MRA with nonspecific findings and neurology recommended continuing aspirin, Plavix and statin.  Patient will benefit from IM B12 injection rather than orally.  Patient will be on B12 injection monthly which has been ordered.  She will be discharged to skilled nursing facility for rehabilitation and will follow-up with primary care provider after discharge based on physical therapy recommendation.    33 minutes spent in discharge timing.  Called patient's son and left a voicemail.  Tried calling the daughter but she is a teacher and working today as per patient.  Case management did reach out to her and updated her.    Pertinent Physical Exam At  Time of Discharge  General: Not in acute distress, alert  HEENT: PERRLA, head intact and normocephalic  Neck: Normal to inspection  Lungs: Clear to auscultation, work of breathing within normal limit  Cardiac: Regular rate and rhythm  Abdomen: Soft nontender, positive bowel sounds  : Exam deferred  Skin: Intact  Hematology: No petechia or excessive ecchymosis  Musculoskeletal: Without significant trauma  Neurological: Alert awake oriented, no focal deficit, cranial nerves grossly intact  Psych: No suicidal ideation or homicidal ideation    Outpatient Follow-Up  Future Appointments   Date Time Provider Department Center   3/5/2024  1:30 PM Lenore Ayala, APRN-CNP [id number] [facility]         Vlad Jimenez MD

## 2024-02-26 NOTE — CARE PLAN
The clinical goals for the shift include patient will remain hemodynamically stable during shift    Problem: Pain  Goal: My pain/discomfort is manageable  2/25/2024 2227 by Coral Hardy RN  Outcome: Progressing  2/25/2024 2227 by Coral Hardy RN  Outcome: Progressing     Problem: Safety  Goal: Patient will be injury free during hospitalization  2/25/2024 2227 by Coral Hardy RN  Outcome: Progressing  2/25/2024 2227 by Coral Hardy RN  Outcome: Progressing  Goal: I will remain free of falls  2/25/2024 2227 by Coral Hardy RN  Outcome: Progressing  2/25/2024 2227 by Coral Hardy RN  Outcome: Progressing     Problem: Daily Care  Goal: Daily care needs are met  2/25/2024 2227 by Coral Hardy RN  Outcome: Progressing  2/25/2024 2227 by Coral Hardy RN  Outcome: Progressing     Problem: Psychosocial Needs  Goal: Demonstrates ability to cope with hospitalization/illness  2/25/2024 2227 by Coral Hardy RN  Outcome: Progressing  2/25/2024 2227 by Coral Hardy RN  Outcome: Progressing  Goal: Collaborate with me, my family, and caregiver to identify my specific goals  2/25/2024 2227 by Coral Hardy RN  Outcome: Progressing  2/25/2024 2227 by Coral Hardy RN  Outcome: Progressing     Problem: Discharge Barriers  Goal: My discharge needs are met  2/25/2024 2227 by Coral Hardy RN  Outcome: Progressing  2/25/2024 2227 by Coral Hardy RN  Outcome: Progressing     Problem: Fall/Injury  Goal: Not fall by end of shift  2/25/2024 2227 by Coral Hardy RN  Outcome: Progressing  2/25/2024 2227 by Coral Hardy RN  Outcome: Progressing  Goal: Be free from injury by end of the shift  2/25/2024 2227 by Coral Hardy RN  Outcome: Progressing  2/25/2024 2227 by Coral Hardy RN  Outcome: Progressing  Goal: Verbalize understanding of personal risk factors for fall in the hospital  2/25/2024 2227 by Coral Hardy RN  Outcome: Progressing  2/25/2024 2227 by Coral Hardy RN  Outcome: Progressing  Goal: Verbalize understanding of risk factor  reduction measures to prevent injury from fall in the home  2/25/2024 2227 by Coral Hardy RN  Outcome: Progressing  2/25/2024 2227 by Coral Hardy RN  Outcome: Progressing  Goal: Use assistive devices by end of the shift  2/25/2024 2227 by Coral Hardy RN  Outcome: Progressing  2/25/2024 2227 by Coral Hardy RN  Outcome: Progressing  Goal: Pace activities to prevent fatigue by end of the shift  2/25/2024 2227 by Coral Hardy RN  Outcome: Progressing  2/25/2024 2227 by Coral Hardy RN  Outcome: Progressing     Problem: Skin  Goal: Participates in plan/prevention/treatment measures  2/25/2024 2227 by Coral Hardy RN  Outcome: Progressing  Flowsheets (Taken 2/25/2024 2227)  Participates in plan/prevention/treatment measures: Elevate heels  2/25/2024 2227 by Coral Hardy RN  Outcome: Progressing  Flowsheets (Taken 2/25/2024 2227)  Participates in plan/prevention/treatment measures: Elevate heels  Goal: Prevent/manage excess moisture  2/25/2024 2227 by Coral Hardy RN  Outcome: Progressing  Flowsheets (Taken 2/25/2024 1132 by Rajwinder Templeton, RN)  Prevent/manage excess moisture: Cleanse incontinence/protect with barrier cream  2/25/2024 2227 by Coral Hardy RN  Outcome: Progressing  Goal: Prevent/minimize sheer/friction injuries  2/25/2024 2227 by Coral Hardy RN  Outcome: Progressing  Flowsheets (Taken 2/25/2024 1132 by Rajwinder Templeton, RN)  Prevent/minimize sheer/friction injuries:   Use pull sheet   HOB 30 degrees or less   Turn/reposition every 2 hours/use positioning/transfer devices  2/25/2024 2227 by Coral Hardy RN  Outcome: Progressing  Goal: Promote/optimize nutrition  2/25/2024 2227 by Coral Hardy RN  Outcome: Progressing  Flowsheets (Taken 2/24/2024 0859 by Rajwinder Templeton, RN)  Promote/optimize nutrition:   Monitor/record intake including meals   Assist with feeding   Consume > 50% meals/supplements   Offer water/supplements/favorite foods  2/25/2024 2227 by Coral Hardy RN  Outcome: Progressing      Problem: Pain - Adult  Goal: Verbalizes/displays adequate comfort level or baseline comfort level  2/25/2024 2227 by Coral Hardy RN  Outcome: Progressing  2/25/2024 2227 by Coral Hardy RN  Outcome: Progressing     Problem: Safety - Adult  Goal: Free from fall injury  2/25/2024 2227 by Coral Hardy RN  Outcome: Progressing  2/25/2024 2227 by Coral Hardy RN  Outcome: Progressing     Problem: Discharge Planning  Goal: Discharge to home or other facility with appropriate resources  2/25/2024 2227 by Coral Hardy RN  Outcome: Progressing  2/25/2024 2227 by Coral Hardy RN  Outcome: Progressing

## 2024-02-26 NOTE — NURSING NOTE
Order received for discharge to nursing facility, report called to staff. Iv d/cd. Pt transported via PAS.

## 2024-02-26 NOTE — HOSPITAL COURSE
88-year-old female with past medical history of CAD, cardiomyopathy, diabetes mellitus, hypertension, hyperlipidemia presented for strokelike symptoms.  Patient was admitted and worked up.  Neurology evaluated patient and patient had imaging done with MRI which was negative for any acute stroke.  There were some nonspecific subcortical white matter disease bilaterally.  Patient is already on therapy with aspirin, Plavix and statin.  Patient also had MRA with nonspecific findings and neurology recommended continuing aspirin, Plavix and statin.  Patient will benefit from IM B12 injection rather than orally.  Patient will be on B12 injection monthly which has been ordered.  She will be discharged to skilled nursing facility for rehabilitation and will follow-up with primary care provider after discharge based on physical therapy recommendation.    33 minutes spent in discharge timing.  Called patient's son and left a voicemail.  Tried calling the daughter but she is a teacher and working today as per patient.  Case management did reach out to her and updated her.

## 2024-02-26 NOTE — PROGRESS NOTES
(Late Entry for 2/25/24 at 10:07 am)  Spoke with patient's daughter regarding discharge planning. She advised patient was private pay at [facility], since she is now admitted inpatient, she would like her to go back under her skilled benefits and they are working on medicaid application. Patient will need her 3 midnights so soonest she can discharge is 2/26/24.  Sent message to [facility] requesting return referral be sent to [facility], updated physician regarding discharge plan.  Patient does not need an auth.

## 2024-02-27 PROBLEM — Z86.69 HISTORY OF GLAUCOMA: Status: ACTIVE | Noted: 2024-02-27

## 2024-02-27 PROBLEM — R93.1 ABNORMAL ECHOCARDIOGRAPHY: Status: ACTIVE | Noted: 2024-02-27

## 2024-02-27 PROBLEM — K92.2 GASTROINTESTINAL HEMORRHAGE: Status: ACTIVE | Noted: 2024-01-27

## 2024-02-27 PROBLEM — D53.9 ANEMIA ASSOCIATED WITH NUTRITIONAL DEFICIENCY: Status: ACTIVE | Noted: 2023-05-01

## 2024-02-27 PROBLEM — R07.9 CHEST PAIN: Status: ACTIVE | Noted: 2024-02-27

## 2024-02-27 PROBLEM — R53.1 WEAKNESS: Status: ACTIVE | Noted: 2024-02-27

## 2024-02-27 PROBLEM — H40.30X0 GLAUCOMA ASSOCIATED WITH OCULAR TRAUMA: Status: ACTIVE | Noted: 2024-02-27

## 2024-02-27 PROBLEM — E66.9 OBESITY WITH BODY MASS INDEX 30 OR GREATER: Status: ACTIVE | Noted: 2024-02-27

## 2024-02-27 PROBLEM — D51.8 DIETARY VITAMIN B12 DEFICIENCY ANEMIA: Status: ACTIVE | Noted: 2024-02-27

## 2024-02-27 PROBLEM — E11.9 TYPE 2 DIABETES MELLITUS (MULTI): Status: ACTIVE | Noted: 2022-11-05

## 2024-02-27 PROBLEM — I10 BENIGN HYPERTENSION: Status: ACTIVE | Noted: 2022-11-05

## 2024-02-27 PROBLEM — I50.22 CHRONIC SYSTOLIC HEART FAILURE (MULTI): Status: ACTIVE | Noted: 2022-11-05

## 2024-02-27 PROBLEM — E66.01 SEVERE OBESITY (MULTI): Status: ACTIVE | Noted: 2024-02-27

## 2024-02-27 RX ORDER — LISINOPRIL 10 MG/1
TABLET ORAL
COMMUNITY
Start: 2024-02-02 | End: 2024-03-15 | Stop reason: WASHOUT

## 2024-02-27 RX ORDER — INSULIN GLARGINE 100 [IU]/ML
INJECTION, SOLUTION SUBCUTANEOUS
COMMUNITY
Start: 2024-02-08

## 2024-02-27 RX ORDER — MUPIROCIN 20 MG/G
OINTMENT TOPICAL
COMMUNITY
Start: 2024-02-12 | End: 2024-03-15 | Stop reason: WASHOUT

## 2024-02-27 RX ORDER — METFORMIN HYDROCHLORIDE 500 MG/1
TABLET ORAL EVERY 12 HOURS
COMMUNITY
Start: 2022-08-09 | End: 2024-03-15 | Stop reason: WASHOUT

## 2024-02-27 RX ORDER — ONDANSETRON 4 MG/1
TABLET, FILM COATED ORAL
COMMUNITY
Start: 2024-02-02 | End: 2024-03-15 | Stop reason: WASHOUT

## 2024-03-04 PROBLEM — R55 SYNCOPE: Status: ACTIVE | Noted: 2024-03-04

## 2024-03-04 ASSESSMENT — ENCOUNTER SYMPTOMS
NAUSEA: 0
ORTHOPNEA: 0
DYSPNEA ON EXERTION: 1
COUGH: 0
ALTERED MENTAL STATUS: 0
IRREGULAR HEARTBEAT: 0
SYNCOPE: 0
HEMATOCHEZIA: 0
SHORTNESS OF BREATH: 0
FEVER: 0
HEMATURIA: 0
WHEEZING: 0
CHILLS: 0
NEAR-SYNCOPE: 0
PALPITATIONS: 0
VOMITING: 0

## 2024-03-04 NOTE — PROGRESS NOTES
Chief Complaint/Reason for Visit:  No chief complaint on file. 6 month cardiovascular follow up    History Of Present Illness:    Andree Johnson is a 88 y.o. female that presents to the office for 6 month follow up.    Taking medications as prescribed.     PMH significant for CAD s/p PCI/stent LAD/diagonal 11/19/18 and RCA 11/30/18, chronic systolic heart failure, DM type 2, breast cancer, LBBB and hyperlipidemia.     In a wheelchair during this office visit. She has been having issues with worsening BLE edema. No orthopnea. Intermittent DUQUE. ***    She was admitted to the hospital 2/23/24-2/26/24 for ? TIA vs syncope.  She presented to the ED after episode at rehab where patient was unresponsive with left sided weakness.  When EMS arrived patient was back to baseline.  During hospitalization her lisinopril was stopped d/t hypotension.  She was seen by neurology that recommended continuing aspirin, clopidogrel and statin.  They also recommended cardiac work up for possible syncope.      Past Medical History:  She has a past medical history of Abnormal findings on diagnostic imaging of heart and coronary circulation (12/04/2018), CHF (congestive heart failure) (CMS/[facility]), Coronary angioplasty status (12/04/2018), Diabetes mellitus (CMS/Formerly [facility]), Glaucoma secondary to eye trauma, right eye, mild stage (12/04/2018), Hypertension, Personal history of malignant neoplasm of breast (12/04/2018), Personal history of other diseases of the digestive system (12/04/2018), Personal history of other diseases of the musculoskeletal system and connective tissue, Personal history of other diseases of the musculoskeletal system and connective tissue (12/04/2018), Personal history of other diseases of the nervous system and sense organs (12/04/2018), and Personal history of other specified conditions.    Past Surgical History:  She has a past surgical history that includes Other surgical history (12/04/2018); Other surgical history  (12/04/2018); Other surgical history (12/04/2018); Other surgical history (12/04/2018); Other surgical history (12/04/2018); Other surgical history (12/04/2018); Other surgical history (12/04/2018); and Other surgical history (12/04/2018).      Social History:  She reports that she has never smoked. She has never used smokeless tobacco. She reports that she does not drink alcohol and does not use drugs.    Family History:  Family History   Problem Relation Name Age of Onset    Hypertension Mother      Breast cancer Neg Hx          Allergies:  Adhesive tape-silicones, Metformin, and Shellfish containing products    Review of Systems   Constitutional: Negative for chills and fever.   Cardiovascular:  Positive for dyspnea on exertion (chronic) and leg swelling. Negative for chest pain, irregular heartbeat, near-syncope, orthopnea, palpitations and syncope.   Respiratory:  Negative for cough, shortness of breath and wheezing.    Gastrointestinal:  Negative for hematochezia, melena, nausea and vomiting.   Genitourinary:  Negative for hematuria.   Psychiatric/Behavioral:  Negative for altered mental status.        Objective      Vitals reviewed.   Constitutional:       Appearance: Not in distress.   Neck:      Vascular: No carotid bruit.   Pulmonary:      Effort: Pulmonary effort is normal.      Breath sounds: Normal breath sounds.   Cardiovascular:      PMI at left midclavicular line. Normal rate. Regular rhythm. S1 with normal intensity. S2 with normal intensity.       Murmurs: There is no murmur.   Edema:     Peripheral edema absent.   Abdominal:      General: Bowel sounds are normal.   Neurological:      Mental Status: Alert and oriented to person, place and time.         Current Outpatient Medications   Medication Instructions    acetaminophen (TYLENOL) 650 mg, oral, Every 8 hours PRN, OTC    aspirin 81 mg, oral, Daily, OTC    atorvastatin (Lipitor) 80 mg tablet 1 tablet, oral, Nightly, Dr Gill    blood sugar  diagnostic (OneTouch Ultra Test) strip 1 each, Not Applicable, Daily, Which ever brand preferred by patient and insurance coverage    carvedilol (Coreg) 12.5 mg tablet 1 tablet, oral, 2 times daily with meals, Dr Gill    cholecalciferol (Vitamin D-3) 125 MCG (5000 UT) capsule 1 capsule, oral, Daily, OTC    clopidogrel (Plavix) 75 mg tablet 1 tablet, oral, Daily, Dr Gill    cyanocobalamin (VITAMIN B-12) 1,000 mcg, intramuscular, Every 30 days    furosemide (LASIX) 20 mg, oral, Daily    glimepiride (AMARYL) 4 mg, oral, 2 times daily, Dr Gray    [place] Solostar U-100 Insulin 100 unit/mL (3 mL) pen     lisinopril 10 mg tablet     metFORMIN (Glucophage) 500 mg tablet oral, Every 12 hours    mupirocin (Bactroban) 2 % ointment     ondansetron (Zofran) 4 mg tablet     ondansetron ODT (ZOFRAN-ODT) 4 mg, oral, Every 8 hours PRN    pioglitazone (ACTOS) 30 mg, oral, Daily    polyethylene glycol (Glycolax) 17 gram/dose powder oral, Dr Gill    Tradjenta 5 mg, oral, Daily, Dr Gray        Last Labs:  CBC -  Lab Results   Component Value Date    WBC 4.7 02/24/2024    HGB 11.1 (L) 02/24/2024    HCT 33.3 (L) 02/24/2024    MCV 97 02/24/2024     (L) 02/24/2024       RENAL FUNCTION PANEL -   Lab Results   Component Value Date    GLUCOSE 125 (H) 02/24/2024     02/24/2024    K 3.9 02/24/2024     02/24/2024    CO2 25 02/24/2024    ANIONGAP 11 02/24/2024    BUN 29 (H) 02/24/2024    CREATININE 0.76 02/24/2024    CALCIUM 7.7 (L) 02/24/2024    PHOS 3.7 01/27/2024    ALBUMIN 3.0 (L) 02/23/2024        CMP -  Lab Results   Component Value Date    CALCIUM 7.7 (L) 02/24/2024    PHOS 3.7 01/27/2024    PROT 5.5 (L) 02/23/2024    ALBUMIN 3.0 (L) 02/23/2024    AST 18 02/23/2024    ALT 14 02/23/2024    ALKPHOS 70 02/23/2024    BILITOT 1.3 (H) 02/23/2024       LIPID PANEL -   Lab Results   Component Value Date    CHOL 84 10/24/2023    TRIG 67 10/24/2023    HDL 46.0 10/24/2023    CHHDL 1.8 10/24/2023    LDLF 96 07/29/2023    VLDL 13  10/24/2023    NHDL 38 10/24/2023     Lab Results   Component Value Date    LDLCALC 25 10/24/2023       Lab Results   Component Value Date     (H) 10/24/2023    HGBA1C 7.9 (H) 02/24/2024       Lab Results   Component Value Date    TSH 1.04 01/27/2024    TSH 1.04 01/27/2024       No results found for this or any previous visit.     Last Cardiology Tests:    TTE 8/7/23 showed LV systolic function is mildly decreased with an EF of 50%. Poorly visualized anatomical structures due to suboptimal image quality. Hypokinetic inferior septum. Mild asymmetric LVH. Moderately enlarged RV. Low normal RV systolic function. Mild aortic valve stenosis. Global hypokinesis of the LV with minor regional variations.     Stress test 2/12/20 showed normal stress myocardial perfusion imaging and response to pharmacologic stress. Well-maintained LV function, EF 54%. EKG portion nondiagnostic due to left bundle branch block without chest pain.     LHC 11/19/18 showed 90% stenosis of the mid LAD and 90% stenosis of proximal 1st diagonal. She is s/p PCI/stent LAD and 1st diagonal.    Visit Vitals  OB Status Menopausal   Smoking Status Never       Assessment/Plan   There were no encounter diagnoses.    1. CAD s/p PTCA/stent of LAD/Diagonal using Crush stent technique and RCA in 2018  Continue clopidogrel 75 mg daily and aspirin 81 mg daily  Continue carvedilol 12.5 mg BID  Continue atorvastatin 80 mg daily.  Negative stress test Feb 2020.   LBBB chronic and was present prior to stress test Feb 2020.  TTE August 2023 with LVEF 50%     2. Chronic systolic heart failure  Less than 2 gm sodium, 2L fluid restriction diet  Continue GDMT: Carvedilol 12.5 mg twice daily  TTE August 2023 with LVEF 50%  Worsening BLE edema   Start furosemide 20 mg daily  Borderline hypotensive and will decrease lisinopril 20 mg daily  Check BMP in 1 week     3. LBBB  Chronic and is noted as far back as 2018 on EKG's.     4. Dyslipidemia  Goal LDL less than 70. She  is not at goal.  Continue atorvastatin 80 mg daily  Reinforced Mediterranean style of eating      5. Aortic valve stenosis  TTE Aug 2023 with mild AS  Monitor with annual echocardiogram     6. HTN  Stable  Continue current antihypertensives: Carvedilol 12.5 mg twice daily, lisinopril 20 mg  daily    7.  Recent hospitalization - TIA vs ? Syncope  Check 14 day Holter monitor    Lenore Ayala, APRN-CNP    negative...

## 2024-03-05 ENCOUNTER — APPOINTMENT (OUTPATIENT)
Dept: CARDIOLOGY | Facility: CLINIC | Age: 89
End: 2024-03-05
Payer: MEDICARE

## 2024-03-14 ASSESSMENT — ENCOUNTER SYMPTOMS
NEAR-SYNCOPE: 0
SYNCOPE: 0
HEMATOCHEZIA: 0
DYSPNEA ON EXERTION: 1
FEVER: 0
VOMITING: 0
ORTHOPNEA: 0
NAUSEA: 0
ALTERED MENTAL STATUS: 0
SHORTNESS OF BREATH: 0
HEMATURIA: 0
COUGH: 0
PALPITATIONS: 0
WHEEZING: 0
IRREGULAR HEARTBEAT: 0
CHILLS: 0

## 2024-03-14 NOTE — PATIENT INSTRUCTIONS
Recommend Mediterranean style of eating  Continue current medications  Check 14 day Holter monitor  Check echocardiogram  Follow-up with Dr. Gill in 1 month  If you have any questions or cardiac concerns, please call our office at [phone].

## 2024-03-14 NOTE — PROGRESS NOTES
Chief Complaint/Reason for Visit:  6 month follow up 6 month cardiovascular follow up    History Of Present Illness:    Andree Johnson is a 88 y.o. female that presents to the office for 6 month follow up.    Taking medications as prescribed.     PMH significant for CAD s/p PCI/stent LAD/diagonal 11/19/18 and RCA 11/30/18, chronic systolic heart failure, DM type 2, breast cancer, LBBB and hyperlipidemia.     She was admitted to the hospital 2/23/24-2/26/24 for ? TIA vs syncope.  She presented to the ED after episode at rehab where patient was unresponsive with left sided weakness.  When EMS arrived patient was back to baseline.  During hospitalization her lisinopril was stopped d/t hypotension.  She was seen by neurology that recommended continuing aspirin, clopidogrel and statin.  They also recommended cardiac work up for possible syncope.      She is staying in [facility]  in [place], [place].  Currently in a wheelchair for this office visit.  She does not transfer from bed to wheelchair. A sara lift was used to get her in the wheelchair by SNF staff.  Chronic BLE edema and DUQUE - she reports SOB when she repositions in bed.    Past Medical History:  She has a past medical history of Abnormal findings on diagnostic imaging of heart and coronary circulation (12/04/2018), CHF (congestive heart failure) (CMS/[facility]), Coronary angioplasty status (12/04/2018), Diabetes mellitus (CMS/[facility]), Glaucoma secondary to eye trauma, right eye, mild stage (12/04/2018), Hypertension, Personal history of malignant neoplasm of breast (12/04/2018), Personal history of other diseases of the digestive system (12/04/2018), Personal history of other diseases of the musculoskeletal system and connective tissue, Personal history of other diseases of the musculoskeletal system and connective tissue (12/04/2018), Personal history of other diseases of the nervous system and sense organs (12/04/2018), and Personal  history of other specified conditions.    Past Surgical History:  She has a past surgical history that includes Other surgical history (12/04/2018); Other surgical history (12/04/2018); Other surgical history (12/04/2018); Other surgical history (12/04/2018); Other surgical history (12/04/2018); Other surgical history (12/04/2018); Other surgical history (12/04/2018); and Other surgical history (12/04/2018).      Social History:  She reports that she has never smoked. She has never used smokeless tobacco. She reports that she does not drink alcohol and does not use drugs.    Family History:  Family History   Problem Relation Name Age of Onset    Hypertension Mother      Breast cancer Neg Hx          Allergies:  Adhesive tape-silicones, Metformin, and Shellfish containing products    Review of Systems   Constitutional: Negative for chills and fever.   Cardiovascular:  Positive for dyspnea on exertion (chronic) and leg swelling (chronic). Negative for chest pain, irregular heartbeat, near-syncope, orthopnea, palpitations and syncope.   Respiratory:  Negative for cough, shortness of breath and wheezing.    Gastrointestinal:  Negative for hematochezia, melena, nausea and vomiting.   Genitourinary:  Negative for hematuria.   Psychiatric/Behavioral:  Negative for altered mental status.        Objective      Vitals reviewed.   Constitutional:       Appearance: Not in distress. Chronically ill-appearing.   Neck:      Vascular: No carotid bruit.   Pulmonary:      Effort: Pulmonary effort is normal.      Breath sounds: Normal breath sounds.   Cardiovascular:      PMI at left midclavicular line. Normal rate. Regular rhythm. S1 with normal intensity. S2 with normal intensity.       Murmurs: There is no murmur.   Edema:     Peripheral edema present.     Pretibial: bilateral trace non-pitting edema of the pretibial area.     Ankle: bilateral trace non-pitting edema of the ankle.     Feet: bilateral trace non-pitting edema of the  feet.  Abdominal:      General: Bowel sounds are normal.   Neurological:      Mental Status: Alert and oriented to person, place and time.         Current Outpatient Medications   Medication Instructions    acetaminophen (TYLENOL) 650 mg, oral, Every 8 hours PRN, OTC    aspirin 81 mg, oral, Daily, OTC    atorvastatin (Lipitor) 80 mg tablet 1 tablet, oral, Nightly, Dr Gill    bisacodyl (Dulcolax, bisacodyl,) 10 mg suppository rectal    blood sugar diagnostic (OneTouch Ultra Test) strip 1 each, Not Applicable, Daily, Which ever brand preferred by patient and insurance coverage    carvedilol (Coreg) 12.5 mg tablet 1 tablet, oral, 2 times daily with meals, Dr Gill    cholecalciferol (Vitamin D-3) 125 MCG (5000 UT) capsule 1 capsule, oral, Daily, OTC    clopidogrel (Plavix) 75 mg tablet 1 tablet, oral, Daily, Dr Gill    cyanocobalamin (VITAMIN B-12) 1,000 mcg, intramuscular, Every 30 days    furosemide (LASIX) 20 mg, oral, Daily    glimepiride (AMARYL) 4 mg, oral, 2 times daily, Dr Gray    guaiFENesin (MUCINEX) 1,200 mg, oral, 2 times daily, Do not crush, chew, or split.    Lantus Solostar U-100 Insulin 100 unit/mL (3 mL) pen     lisinopril 10 mg tablet     magnesium hydroxide (Milk of Magnesia) 400 mg/5 mL suspension oral, Daily PRN    metFORMIN (Glucophage) 500 mg tablet oral, Every 12 hours    mupirocin (Bactroban) 2 % ointment     ondansetron (Zofran) 4 mg tablet     ondansetron ODT (ZOFRAN-ODT) 4 mg, oral, Every 8 hours PRN    pioglitazone (ACTOS) 30 mg, oral, Daily    polyethylene glycol (Glycolax) 17 gram/dose powder oral, Dr Gill    Tradjenta 5 mg, oral, Daily, Dr Gray        Last Labs:  CBC -  Lab Results   Component Value Date    WBC 4.7 02/24/2024    HGB 11.1 (L) 02/24/2024    HCT 33.3 (L) 02/24/2024    MCV 97 02/24/2024     (L) 02/24/2024       RENAL FUNCTION PANEL -   Lab Results   Component Value Date    GLUCOSE 125 (H) 02/24/2024     02/24/2024    K 3.9 02/24/2024     02/24/2024     "CO2 25 02/24/2024    ANIONGAP 11 02/24/2024    BUN 29 (H) 02/24/2024    CREATININE 0.76 02/24/2024    CALCIUM 7.7 (L) 02/24/2024    PHOS 3.7 01/27/2024    ALBUMIN 3.0 (L) 02/23/2024        CMP -  Lab Results   Component Value Date    CALCIUM 7.7 (L) 02/24/2024    PHOS 3.7 01/27/2024    PROT 5.5 (L) 02/23/2024    ALBUMIN 3.0 (L) 02/23/2024    AST 18 02/23/2024    ALT 14 02/23/2024    ALKPHOS 70 02/23/2024    BILITOT 1.3 (H) 02/23/2024       LIPID PANEL -   Lab Results   Component Value Date    CHOL 84 10/24/2023    TRIG 67 10/24/2023    HDL 46.0 10/24/2023    CHHDL 1.8 10/24/2023    LDLF 96 07/29/2023    VLDL 13 10/24/2023    NHDL 38 10/24/2023     Lab Results   Component Value Date    LDLCALC 25 10/24/2023       Lab Results   Component Value Date     (H) 10/24/2023    HGBA1C 7.9 (H) 02/24/2024       Lab Results   Component Value Date    TSH 1.04 01/27/2024    TSH 1.04 01/27/2024       No results found for this or any previous visit.     Last Cardiology Tests:    TTE 8/7/23 showed LV systolic function is mildly decreased with an EF of 50%. Poorly visualized anatomical structures due to suboptimal image quality. Hypokinetic inferior septum. Mild asymmetric LVH. Moderately enlarged RV. Low normal RV systolic function. Mild aortic valve stenosis. Global hypokinesis of the LV with minor regional variations.     Stress test 2/12/20 showed normal stress myocardial perfusion imaging and response to pharmacologic stress. Well-maintained LV function, EF 54%. EKG portion nondiagnostic due to left bundle branch block without chest pain.     LHC 11/19/18 showed 90% stenosis of the mid LAD and 90% stenosis of proximal 1st diagonal. She is s/p PCI/stent LAD and 1st diagonal.    Visit Vitals  /60 (BP Location: Left arm, Patient Position: Sitting, BP Cuff Size: Adult)   Pulse 63   Ht 1.575 m (5' 2\")   Wt 98 kg (216 lb)   SpO2 97%   BMI 39.51 kg/m²   OB Status Menopausal   Smoking Status Never   BSA 2.07 m² "       Assessment/Plan   The primary encounter diagnosis was Arteriosclerosis of coronary artery. Diagnoses of Chronic systolic heart failure (CMS/HCC), Hyperlipidemia, unspecified hyperlipidemia type, Aortic valve stenosis, etiology of cardiac valve disease unspecified, Benign hypertension, and Syncope, unspecified syncope type were also pertinent to this visit.    1. CAD s/p PTCA/stent of LAD/Diagonal using Crush stent technique and RCA in 2018  Continue clopidogrel 75 mg daily and aspirin 81 mg daily  Continue carvedilol 12.5 mg BID  Continue atorvastatin 80 mg daily.  Negative stress test Feb 2020.   LBBB chronic and was present prior to stress test Feb 2020.  TTE August 2023 with LVEF 50%     2. Chronic systolic heart failure  Less than 2 gm sodium, 2L fluid restriction diet  Continue GDMT: Carvedilol 12.5 mg twice daily  TTE August 2023 with LVEF 50%  Chronic BLE edema - nonpitting  Continue furosemide 20 mg daily  Lisinopril stopped during recent hospitalization d/t hypotension     3. LBBB  Chronic and is noted as far back as 2018 on EKG's.  Recent admission with TIA vs syncope  Check echocardiogram and 14 day Holter monitor     4. Dyslipidemia  Goal LDL less than 70. She is at goal.  Continue atorvastatin 80 mg daily  Reinforced Mediterranean style of eating      5. Aortic valve stenosis  TTE Aug 2023 with mild AS  Monitor with annual echocardiogram     6. HTN  Stable  Continue current antihypertensives: Carvedilol 12.5 mg twice daily and furosemide 20 mg daily    7. Recent hospitalization - TIA vs ? Syncope  Check 14 day Holter monitor  Check echocardiogram    Lenore Ayala, APRN-CNP

## 2024-03-15 ENCOUNTER — ANCILLARY PROCEDURE (OUTPATIENT)
Dept: CARDIOLOGY | Facility: CLINIC | Age: 89
End: 2024-03-15
Payer: MEDICARE

## 2024-03-15 ENCOUNTER — OFFICE VISIT (OUTPATIENT)
Dept: CARDIOLOGY | Facility: CLINIC | Age: 89
End: 2024-03-15
Payer: MEDICARE

## 2024-03-15 VITALS
OXYGEN SATURATION: 97 % | HEIGHT: 62 IN | SYSTOLIC BLOOD PRESSURE: 100 MMHG | WEIGHT: 216 LBS | BODY MASS INDEX: 39.75 KG/M2 | DIASTOLIC BLOOD PRESSURE: 60 MMHG | HEART RATE: 63 BPM

## 2024-03-15 DIAGNOSIS — I25.10 ARTERIOSCLEROSIS OF CORONARY ARTERY: Primary | ICD-10-CM

## 2024-03-15 DIAGNOSIS — R55 SYNCOPE, UNSPECIFIED SYNCOPE TYPE: ICD-10-CM

## 2024-03-15 DIAGNOSIS — I50.22 CHRONIC SYSTOLIC HEART FAILURE (MULTI): ICD-10-CM

## 2024-03-15 DIAGNOSIS — I35.0 AORTIC VALVE STENOSIS, ETIOLOGY OF CARDIAC VALVE DISEASE UNSPECIFIED: ICD-10-CM

## 2024-03-15 DIAGNOSIS — E78.5 HYPERLIPIDEMIA, UNSPECIFIED HYPERLIPIDEMIA TYPE: ICD-10-CM

## 2024-03-15 DIAGNOSIS — I10 BENIGN HYPERTENSION: ICD-10-CM

## 2024-03-15 PROCEDURE — 1111F DSCHRG MED/CURRENT MED MERGE: CPT | Performed by: NURSE PRACTITIONER

## 2024-03-15 PROCEDURE — 1036F TOBACCO NON-USER: CPT | Performed by: NURSE PRACTITIONER

## 2024-03-15 PROCEDURE — 1159F MED LIST DOCD IN RCRD: CPT | Performed by: NURSE PRACTITIONER

## 2024-03-15 PROCEDURE — 99214 OFFICE O/P EST MOD 30 MIN: CPT | Performed by: NURSE PRACTITIONER

## 2024-03-15 PROCEDURE — 1157F ADVNC CARE PLAN IN RCRD: CPT | Performed by: NURSE PRACTITIONER

## 2024-03-15 PROCEDURE — 3074F SYST BP LT 130 MM HG: CPT | Performed by: NURSE PRACTITIONER

## 2024-03-15 PROCEDURE — 93248 EXT ECG>7D<15D REV&INTERPJ: CPT | Performed by: INTERNAL MEDICINE

## 2024-03-15 PROCEDURE — 3078F DIAST BP <80 MM HG: CPT | Performed by: NURSE PRACTITIONER

## 2024-03-15 PROCEDURE — 93246 EXT ECG>7D<15D RECORDING: CPT | Performed by: INTERNAL MEDICINE

## 2024-03-15 PROCEDURE — 1160F RVW MEDS BY RX/DR IN RCRD: CPT | Performed by: NURSE PRACTITIONER

## 2024-03-15 RX ORDER — BISACODYL 10 MG/1
SUPPOSITORY RECTAL
COMMUNITY

## 2024-03-15 RX ORDER — ADHESIVE BANDAGE
BANDAGE TOPICAL DAILY PRN
COMMUNITY

## 2024-03-15 RX ORDER — GUAIFENESIN 600 MG/1
1200 TABLET, EXTENDED RELEASE ORAL 2 TIMES DAILY
COMMUNITY

## 2024-04-02 ENCOUNTER — APPOINTMENT (OUTPATIENT)
Dept: CARDIOLOGY | Facility: HOSPITAL | Age: 89
End: 2024-04-02
Payer: MEDICARE

## 2024-04-03 ENCOUNTER — HOSPITAL ENCOUNTER (OUTPATIENT)
Dept: CARDIOLOGY | Facility: HOSPITAL | Age: 89
Discharge: HOME | End: 2024-04-03
Payer: COMMERCIAL

## 2024-04-03 DIAGNOSIS — I25.10 ARTERIOSCLEROSIS OF CORONARY ARTERY: ICD-10-CM

## 2024-04-03 DIAGNOSIS — I50.22 CHRONIC SYSTOLIC HEART FAILURE (MULTI): ICD-10-CM

## 2024-04-03 DIAGNOSIS — R55 SYNCOPE, UNSPECIFIED SYNCOPE TYPE: ICD-10-CM

## 2024-04-03 PROBLEM — Z87.39 PERSONAL HISTORY OF OTHER DISEASES OF THE MUSCULOSKELETAL SYSTEM AND CONNECTIVE TISSUE: Status: ACTIVE | Noted: 2024-04-03

## 2024-04-03 PROBLEM — I50.9 CHF (CONGESTIVE HEART FAILURE) (MULTI): Status: ACTIVE | Noted: 2024-04-03

## 2024-04-03 PROBLEM — Z87.19 PERSONAL HISTORY OF OTHER DISEASES OF THE DIGESTIVE SYSTEM: Status: ACTIVE | Noted: 2024-04-03

## 2024-04-03 PROBLEM — Z87.898 PERSONAL HISTORY OF OTHER SPECIFIED CONDITIONS: Status: ACTIVE | Noted: 2024-04-03

## 2024-04-03 LAB
AORTIC VALVE MEAN GRADIENT: 8.2 MMHG
AORTIC VALVE PEAK VELOCITY: 1.9 M/S
AV PEAK GRADIENT: 14.4 MMHG
AVA (PEAK VEL): 1.44 CM2
AVA (VTI): 1.51 CM2
EJECTION FRACTION APICAL 4 CHAMBER: 50
LEFT ATRIUM VOLUME AREA LENGTH INDEX BSA: 16.1 ML/M2
LEFT VENTRICLE INTERNAL DIMENSION DIASTOLE: 4.06 CM (ref 3.5–6)
LEFT VENTRICULAR OUTFLOW TRACT DIAMETER: 2.01 CM
LV EJECTION FRACTION BIPLANE: 54 %
MITRAL VALVE E/A RATIO: 0.67
MITRAL VALVE E/E' RATIO: 11.41
RIGHT VENTRICLE FREE WALL PEAK S': 8.67 CM/S
RIGHT VENTRICLE PEAK SYSTOLIC PRESSURE: 43.1 MMHG
TRICUSPID ANNULAR PLANE SYSTOLIC EXCURSION: 2 CM

## 2024-04-03 PROCEDURE — 93306 TTE W/DOPPLER COMPLETE: CPT

## 2024-04-03 PROCEDURE — 93306 TTE W/DOPPLER COMPLETE: CPT | Performed by: INTERNAL MEDICINE

## 2024-04-03 PROCEDURE — 2500000004 HC RX 250 GENERAL PHARMACY W/ HCPCS (ALT 636 FOR OP/ED): Performed by: NURSE PRACTITIONER

## 2024-04-03 RX ADMIN — HUMAN ALBUMIN MICROSPHERES AND PERFLUTREN 0.5 ML: 10; .22 INJECTION, SOLUTION INTRAVENOUS at 11:08

## 2024-04-04 ENCOUNTER — TELEPHONE (OUTPATIENT)
Dept: CARDIOLOGY | Facility: CLINIC | Age: 89
End: 2024-04-04
Payer: COMMERCIAL

## 2024-04-04 DIAGNOSIS — I45.5 SINUS PAUSE: Primary | ICD-10-CM

## 2024-04-04 DIAGNOSIS — R55 SYNCOPE AND COLLAPSE: ICD-10-CM

## 2024-04-04 DIAGNOSIS — R00.1 BRADYCARDIA: ICD-10-CM

## 2024-04-04 LAB — BODY SURFACE AREA: 2.07 M2

## 2024-04-04 NOTE — TELEPHONE ENCOUNTER
Result Communication    Resulted Orders   Holter or Event Cardiac Monitor   Result Value Ref Range    BSA 2.07 m2    Narrative    Please see scanned results.         4:19 PM      Results were not successfully communicated with the patient.  Main phone number listed does not work.  Gary Fonseca attempted to call patient daughter and had to leave a voicemail.  High priority message sent to RN to try and contact patient again later.      Recommend stopping carvedilol and establishing with EP with their next office day at [facility] on Monday 4/8/24.

## 2024-04-04 NOTE — TELEPHONE ENCOUNTER
----- Message from MIGEL Olvera-CNP sent at 4/4/2024  4:16 PM EDT -----  I received Holter monitor back which shows multiple pauses - longest 4.6 seconds.  Recommend stopping carvedilol and establishing with EP on Monday.  She originally had an appt with Dr. Weston Gill on Monday which will be changed to Dr. Gee.  Gary tried to call her daughter and had to leave a voicemail.  The main phone number listed does not work.  Please try and get in contact with her again tomorrow.      Gary - please reschedule f/u with Dr. Weston Gill farther out in about 1-2 months.

## 2024-04-05 NOTE — TELEPHONE ENCOUNTER
I called and spoke with Macy, nurse, at [facility] in [place], [place]. She reports patient has changed her code status and declining any further doctor appointments. She will discuss with patient/family the EP recommendation, but nurse declined to schedule at this time. I did let nurse know that patient's holter showed pauses and recommendation is to stop Carvedilol. She will let facility MD and family know. I sent message to Benitez to give update on this.

## 2024-04-05 NOTE — TELEPHONE ENCOUNTER
Per Lenore's office note looks like patient lives at [facility] in [place], [place] # [phone]. I tried calling twice and unable to reach the nurse.

## 2024-04-05 NOTE — TELEPHONE ENCOUNTER
----- Message from Soumya Fonseca sent at 4/5/2024  1:15 PM EDT -----  Regarding: AS Appt  Left another VM for her daughter Maye. Hoping to hear something back soon...  ----- Message -----  From: MIGEL Olvera-CNP  Sent: 4/4/2024   4:19 PM EDT  To: Soumya Fonseca; Digna Pal, MARY    I received Holter monitor back which shows multiple pauses - longest 4.6 seconds.  Recommend stopping carvedilol and establishing with EP on Monday.  She originally had an appt with Dr. Weston Gill on Monday which will be changed to Dr. Gee.  Gary tried to call her daughter and had to leave a voicemail.  The main phone number listed does not work.  Please try and get in contact with her again tomorrow.      Gary - please reschedule f/u with Dr. Weston Gill farther out in about 1-2 months.

## 2024-04-08 ENCOUNTER — APPOINTMENT (OUTPATIENT)
Dept: CARDIOLOGY | Facility: CLINIC | Age: 89
End: 2024-04-08
Payer: MEDICARE

## 2024-07-17 ENCOUNTER — HOSPITAL ENCOUNTER (EMERGENCY)
Facility: HOSPITAL | Age: 89
Discharge: HOME | End: 2024-07-18
Attending: EMERGENCY MEDICINE
Payer: MEDICARE

## 2024-07-17 ENCOUNTER — APPOINTMENT (OUTPATIENT)
Dept: CARDIOLOGY | Facility: HOSPITAL | Age: 89
End: 2024-07-17
Payer: MEDICARE

## 2024-07-17 DIAGNOSIS — K92.0 HEMATEMESIS, UNSPECIFIED WHETHER NAUSEA PRESENT: Primary | ICD-10-CM

## 2024-07-17 DIAGNOSIS — T68.XXXA HYPOTHERMIA, INITIAL ENCOUNTER: ICD-10-CM

## 2024-07-17 LAB
ALBUMIN SERPL BCP-MCNC: 2.8 G/DL (ref 3.4–5)
ALP SERPL-CCNC: 82 U/L (ref 33–136)
ALT SERPL W P-5'-P-CCNC: 23 U/L (ref 7–45)
ANION GAP SERPL CALC-SCNC: 12 MMOL/L (ref 10–20)
AST SERPL W P-5'-P-CCNC: 20 U/L (ref 9–39)
BASOPHILS # BLD AUTO: 0.03 X10*3/UL (ref 0–0.1)
BASOPHILS NFR BLD AUTO: 0.4 %
BILIRUB SERPL-MCNC: 0.8 MG/DL (ref 0–1.2)
BUN SERPL-MCNC: 30 MG/DL (ref 6–23)
CALCIUM SERPL-MCNC: 8.8 MG/DL (ref 8.6–10.3)
CHLORIDE SERPL-SCNC: 95 MMOL/L (ref 98–107)
CO2 SERPL-SCNC: 33 MMOL/L (ref 21–32)
CREAT SERPL-MCNC: 0.7 MG/DL (ref 0.5–1.05)
EGFRCR SERPLBLD CKD-EPI 2021: 83 ML/MIN/1.73M*2
EOSINOPHIL # BLD AUTO: 0.01 X10*3/UL (ref 0–0.4)
EOSINOPHIL NFR BLD AUTO: 0.1 %
ERYTHROCYTE [DISTWIDTH] IN BLOOD BY AUTOMATED COUNT: 16 % (ref 11.5–14.5)
GLUCOSE SERPL-MCNC: 339 MG/DL (ref 74–99)
HCT VFR BLD AUTO: 43.2 % (ref 36–46)
HGB BLD-MCNC: 14.2 G/DL (ref 12–16)
IMM GRANULOCYTES # BLD AUTO: 0.11 X10*3/UL (ref 0–0.5)
IMM GRANULOCYTES NFR BLD AUTO: 1.5 % (ref 0–0.9)
INR PPP: 1 (ref 0.9–1.1)
LYMPHOCYTES # BLD AUTO: 0.46 X10*3/UL (ref 0.8–3)
LYMPHOCYTES NFR BLD AUTO: 6.2 %
MCH RBC QN AUTO: 29.8 PG (ref 26–34)
MCHC RBC AUTO-ENTMCNC: 32.9 G/DL (ref 32–36)
MCV RBC AUTO: 91 FL (ref 80–100)
MONOCYTES # BLD AUTO: 0.26 X10*3/UL (ref 0.05–0.8)
MONOCYTES NFR BLD AUTO: 3.5 %
NEUTROPHILS # BLD AUTO: 6.58 X10*3/UL (ref 1.6–5.5)
NEUTROPHILS NFR BLD AUTO: 88.3 %
NRBC BLD-RTO: 0.3 /100 WBCS (ref 0–0)
PLATELET # BLD AUTO: 122 X10*3/UL (ref 150–450)
POTASSIUM SERPL-SCNC: 3.6 MMOL/L (ref 3.5–5.3)
PROT SERPL-MCNC: 5.7 G/DL (ref 6.4–8.2)
PROTHROMBIN TIME: 11 SECONDS (ref 9.8–12.8)
RBC # BLD AUTO: 4.77 X10*6/UL (ref 4–5.2)
SODIUM SERPL-SCNC: 136 MMOL/L (ref 136–145)
TSH SERPL-ACNC: 0.95 MIU/L (ref 0.44–3.98)
WBC # BLD AUTO: 7.5 X10*3/UL (ref 4.4–11.3)

## 2024-07-17 PROCEDURE — 2500000004 HC RX 250 GENERAL PHARMACY W/ HCPCS (ALT 636 FOR OP/ED): Performed by: EMERGENCY MEDICINE

## 2024-07-17 PROCEDURE — 93005 ELECTROCARDIOGRAM TRACING: CPT

## 2024-07-17 PROCEDURE — 96361 HYDRATE IV INFUSION ADD-ON: CPT

## 2024-07-17 PROCEDURE — C9113 INJ PANTOPRAZOLE SODIUM, VIA: HCPCS | Performed by: EMERGENCY MEDICINE

## 2024-07-17 PROCEDURE — 2500000005 HC RX 250 GENERAL PHARMACY W/O HCPCS: Performed by: STUDENT IN AN ORGANIZED HEALTH CARE EDUCATION/TRAINING PROGRAM

## 2024-07-17 PROCEDURE — 85025 COMPLETE CBC W/AUTO DIFF WBC: CPT | Performed by: EMERGENCY MEDICINE

## 2024-07-17 PROCEDURE — 84443 ASSAY THYROID STIM HORMONE: CPT | Performed by: EMERGENCY MEDICINE

## 2024-07-17 PROCEDURE — 84075 ASSAY ALKALINE PHOSPHATASE: CPT | Performed by: EMERGENCY MEDICINE

## 2024-07-17 PROCEDURE — 36415 COLL VENOUS BLD VENIPUNCTURE: CPT | Performed by: EMERGENCY MEDICINE

## 2024-07-17 PROCEDURE — 85610 PROTHROMBIN TIME: CPT | Performed by: EMERGENCY MEDICINE

## 2024-07-17 PROCEDURE — 99284 EMERGENCY DEPT VISIT MOD MDM: CPT | Mod: 25

## 2024-07-17 PROCEDURE — 96374 THER/PROPH/DIAG INJ IV PUSH: CPT

## 2024-07-17 RX ORDER — ONDANSETRON 4 MG/1
4 TABLET, FILM COATED ORAL EVERY 6 HOURS
Qty: 12 TABLET | Refills: 0 | Status: SHIPPED | OUTPATIENT
Start: 2024-07-17 | End: 2024-07-20

## 2024-07-17 RX ORDER — ONDANSETRON 4 MG/1
4 TABLET, ORALLY DISINTEGRATING ORAL ONCE
Status: COMPLETED | OUTPATIENT
Start: 2024-07-17 | End: 2024-07-17

## 2024-07-17 RX ORDER — ONDANSETRON HYDROCHLORIDE 2 MG/ML
4 INJECTION, SOLUTION INTRAVENOUS ONCE
Status: DISCONTINUED | OUTPATIENT
Start: 2024-07-17 | End: 2024-07-17

## 2024-07-17 RX ORDER — PANTOPRAZOLE SODIUM 40 MG/10ML
40 INJECTION, POWDER, LYOPHILIZED, FOR SOLUTION INTRAVENOUS ONCE
Status: COMPLETED | OUTPATIENT
Start: 2024-07-17 | End: 2024-07-17

## 2024-07-17 RX ORDER — OMEPRAZOLE 20 MG/1
20 CAPSULE, DELAYED RELEASE ORAL DAILY
Qty: 30 CAPSULE | Refills: 0 | Status: SHIPPED | OUTPATIENT
Start: 2024-07-17 | End: 2024-08-16

## 2024-07-17 RX ADMIN — SODIUM CHLORIDE 1000 ML: 9 INJECTION, SOLUTION INTRAVENOUS at 15:07

## 2024-07-17 RX ADMIN — ONDANSETRON 4 MG: 4 TABLET, ORALLY DISINTEGRATING ORAL at 23:17

## 2024-07-17 RX ADMIN — PANTOPRAZOLE SODIUM 40 MG: 40 INJECTION, POWDER, FOR SOLUTION INTRAVENOUS at 17:34

## 2024-07-17 ASSESSMENT — LIFESTYLE VARIABLES
EVER FELT BAD OR GUILTY ABOUT YOUR DRINKING: NO
TOTAL SCORE: 0
HAVE YOU EVER FELT YOU SHOULD CUT DOWN ON YOUR DRINKING: NO
EVER HAD A DRINK FIRST THING IN THE MORNING TO STEADY YOUR NERVES TO GET RID OF A HANGOVER: NO
HAVE PEOPLE ANNOYED YOU BY CRITICIZING YOUR DRINKING: NO

## 2024-07-17 ASSESSMENT — PAIN - FUNCTIONAL ASSESSMENT: PAIN_FUNCTIONAL_ASSESSMENT: 0-10

## 2024-07-17 ASSESSMENT — PAIN SCALES - GENERAL: PAINLEVEL_OUTOF10: 0 - NO PAIN

## 2024-07-17 NOTE — ED NOTES
P[facility] ETA   [facility] [phone]     Arun Vera RN  07/17/24 1938

## 2024-07-17 NOTE — ED PROVIDER NOTES
HPI   Chief Complaint   Patient presents with    Vomiting Blood     Coffee ground emesis       Patient presents to the emergency department secondary to coffee-ground emesis.  It was reported by the nursing facility that she has had 3 episodes of coffee-ground emesis.  She is unable to answer questions other than her name or nods yes and no to questions.  She has a history of stroke.  She is on Plavix.  She is from a skilled nursing facility.  History is limited otherwise secondary to patient's inability to provide history.                          [place] Coma Scale Score: 13                  Patient History   Past Medical History:   Diagnosis Date    Abnormal findings on diagnostic imaging of heart and coronary circulation 12/04/2018    Abnormal echocardiogram    CHF (congestive heart failure) (Multi)     Coronary angioplasty status 12/04/2018    S/P PTCA (percutaneous transluminal coronary angioplasty)    Diabetes mellitus (Multi)     Glaucoma secondary to eye trauma, right eye, mild stage 12/04/2018    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, mild stage    Hypertension     Personal history of malignant neoplasm of breast 12/04/2018    History of malignant neoplasm of breast    Personal history of other diseases of the digestive system 12/04/2018    History of constipation    Personal history of other diseases of the musculoskeletal system and connective tissue     History of arthritis    Personal history of other diseases of the musculoskeletal system and connective tissue 12/04/2018    History of chronic back pain    Personal history of other diseases of the nervous system and sense organs 12/04/2018    History of glaucoma    Personal history of other specified conditions     History of dizziness     Past Surgical History:   Procedure Laterality Date    OTHER SURGICAL HISTORY  12/04/2018    Hysterectomy    OTHER SURGICAL HISTORY  12/04/2018    Breast biopsy    OTHER SURGICAL HISTORY  12/04/2018     Colonoscopy    OTHER SURGICAL HISTORY  12/04/2018    Laparoscopy    OTHER SURGICAL HISTORY  12/04/2018    Hernia repair    OTHER SURGICAL HISTORY  12/04/2018    Cataract surgery    OTHER SURGICAL HISTORY  12/04/2018    Cholecystectomy    OTHER SURGICAL HISTORY  12/04/2018    Mastectomy     Family History   Problem Relation Name Age of Onset    Hypertension Mother      Breast cancer Neg Hx       Social History     Tobacco Use    Smoking status: Never    Smokeless tobacco: Never   Vaping Use    Vaping status: Never Used   Substance Use Topics    Alcohol use: Never    Drug use: Never       Physical Exam   ED Triage Vitals [07/17/24 1450]   Temperature Heart Rate Respirations BP   (!) 28.2 °C (82.8 °F) (!) 46 12 99/54      Pulse Ox Temp Source Heart Rate Source Patient Position   97 % Skin -- --      BP Location FiO2 (%)     -- --       Physical Exam  Vitals and nursing note reviewed.   Constitutional:       General: She is not in acute distress.     Comments: Patient is a chronically ill-appearing elderly female.  She is awake laying in bed.  She is able to nod yes and no to some questions.  She opens eyes to her name.   HENT:      Head: Normocephalic and atraumatic.      Nose: Nose normal.      Mouth/Throat:      Mouth: Mucous membranes are moist.   Eyes:      Extraocular Movements: Extraocular movements intact.      Pupils: Pupils are equal, round, and reactive to light.   Cardiovascular:      Rate and Rhythm: Regular rhythm. Bradycardia present.      Pulses: Normal pulses.      Heart sounds: Normal heart sounds.   Pulmonary:      Effort: Pulmonary effort is normal.      Breath sounds: Normal breath sounds.   Abdominal:      General: Abdomen is flat. Bowel sounds are normal.      Palpations: Abdomen is soft.      Tenderness: There is no abdominal tenderness. There is no guarding or rebound.   Musculoskeletal:         General: Normal range of motion.      Cervical back: Normal range of motion.      Right lower leg:  Edema present.      Left lower leg: Edema present.      Comments: Patient has chronic diffuse muscle weakness.  No focal neurologic deficits.   Skin:     General: Skin is warm and dry.      Capillary Refill: Capillary refill takes less than 2 seconds.   Neurological:      Mental Status: Mental status is at baseline. She is disoriented.      Comments: Chronic diffuse weakness.  No focal neurologic deficits.       Labs Reviewed   CBC WITH AUTO DIFFERENTIAL   COMPREHENSIVE METABOLIC PANEL   PROTIME-INR   TSH     Pain Management Panel           No data to display              No orders to display     ED Course & MDM   Diagnoses as of 07/17/24 1555   Hematemesis, unspecified whether nausea present   Hypothermia, initial encounter       Medical Decision Making  Patient presents secondary to coffee-ground emesis.  Patient is noted to be hypothermic and bradycardic.  Blood pressure is borderline hypotensive.  Patient is given 1 L of normal saline.  Patient does have a DNR comfort care order in her chart from her nursing facility.  Family reports that they want to maintain DNR comfort care.  Patient is evaluated in the emergency department with laboratory workup and Katelyn hugger is placed for hypothermia.  Patient's laboratory workup does not show remarkable abnormality.  I had a discussion at bedside with the patient's daughters.  At this time they would like to continue her DNR comfort care status.  They would like to pursue hospice care.  They are comfortable with her going back to her skilled nursing facility and hospice taking care of her in her facility.  At this time she does not meet criteria for admission to the hospital.  She is discharged home for comfort measures.        Procedure  ECG 12 lead    Performed by: Alaina Dotson MD  Authorized by: Alaina Dotson MD    Interpretation:     Details:  EKG was performed at 3:02 PM.  It is sinus rhythm with a rate of 48.  No acute ST elevation.  Mild artifact  limitation.  Left bundle branch block is present.  NE interval is 206 and QTc is 544.       Alaina Dotson MD  07/17/24 6636

## 2024-07-17 NOTE — ED TRIAGE NOTES
Pt presents from [facility] for coffee ground emesis. Facility states 3 episodes prior to EMS arrival. No emesis for EMS. Pt on plavix. Pt is a DNR. Pt hypotensive, bradycardic and hypothermic on arrival. Dr. Dotson at bedside. Katelyn Bledsoe applied.

## 2024-07-17 NOTE — ED NOTES
P[facility] ETA [id number]  [facility] [phone]     Arun Vera, MARY  07/17/24 1930

## 2024-07-17 NOTE — ED NOTES
DC -> SNF  P[facility] ETA...  [facility] [phone]     Andree Zaman, EMT  07/17/24 3071

## 2024-07-17 NOTE — ED NOTES
PAS ETA [id number]  [facility] [phone]     Arun Vera, MARY  07/17/24 1932

## 2024-07-18 VITALS
RESPIRATION RATE: 16 BRPM | TEMPERATURE: 96.1 F | OXYGEN SATURATION: 96 % | SYSTOLIC BLOOD PRESSURE: 102 MMHG | HEIGHT: 66 IN | BODY MASS INDEX: 34.26 KG/M2 | HEART RATE: 67 BPM | WEIGHT: 213.19 LBS | DIASTOLIC BLOOD PRESSURE: 65 MMHG

## 2024-07-18 LAB
ATRIAL RATE: 0 BPM
P AXIS: 150 DEGREES
PR INTERVAL: 206 MS
Q ONSET: 251 MS
QRS COUNT: 7 BEATS
QRS DURATION: 163 MS
QT INTERVAL: 608 MS
QTC CALCULATION(BAZETT): 544 MS
QTC FREDERICIA: 564 MS
R AXIS: -45 DEGREES
T AXIS: 70 DEGREES
T OFFSET: 555 MS
VENTRICULAR RATE: 48 BPM

## 2024-07-18 NOTE — ED NOTES
Lyxn eta 0030  [facility] [phone]     Arun Vera, MARY  07/17/24 2750